# Patient Record
Sex: FEMALE | Race: WHITE | NOT HISPANIC OR LATINO | Employment: FULL TIME | URBAN - METROPOLITAN AREA
[De-identification: names, ages, dates, MRNs, and addresses within clinical notes are randomized per-mention and may not be internally consistent; named-entity substitution may affect disease eponyms.]

---

## 2017-04-13 ENCOUNTER — TELEPHONE (OUTPATIENT)
Dept: FAMILY MEDICINE | Facility: CLINIC | Age: 37
End: 2017-04-13

## 2017-04-13 DIAGNOSIS — F41.1 GAD (GENERALIZED ANXIETY DISORDER): ICD-10-CM

## 2017-04-13 DIAGNOSIS — F33.1 MAJOR DEPRESSIVE DISORDER, RECURRENT EPISODE, MODERATE (H): ICD-10-CM

## 2017-04-13 RX ORDER — BUSPIRONE HYDROCHLORIDE 5 MG/1
TABLET ORAL
Qty: 540 TABLET | Refills: 1 | Status: SHIPPED | OUTPATIENT
Start: 2017-04-13 | End: 2017-06-22

## 2017-04-13 RX ORDER — BUSPIRONE HYDROCHLORIDE 5 MG/1
TABLET ORAL
Qty: 30 TABLET | Refills: 0 | Status: SHIPPED | OUTPATIENT
Start: 2017-04-13 | End: 2018-08-31

## 2017-04-13 NOTE — TELEPHONE ENCOUNTER
Reason for Call:  Medication or medication refill:    Do you use a [facility]?  Name of the pharmacy and phone number for the current request:  [facility], pended    Name of the medication requested: busPIRone (BUSPAR) 5 MG tablet    Other request: there is another request for a refill to be sent to her mail order, but patient is currently out of this medication and is requesting that a small, 5 day supply to be sent into the [facility].  Please address ASAP.    Can we leave a detailed message on this number? YES    Phone number patient can be reached at: Cell number on file:    Telephone Information:   Mobile [phone]     Best Time: any    Call taken on 4/13/2017 at 10:29 AM by Fidelia Peters    busPIRone (BUSPAR) 5 MG tablet       Last Written Prescription Date: 9-14-16  Last Fill Quantity: 540; # refills: 1  Last Office Visit with G, P or [facility] prescribing provider:  9-2-16        Last PHQ-9 score on record=   PHQ-9 SCORE 9/2/2016   Total Score -   Total Score 3       Lab Results   Component Value Date    AST 20 08/17/2007     Lab Results   Component Value Date    ALT 22 08/17/2007

## 2017-04-13 NOTE — TELEPHONE ENCOUNTER
busPIRone (BUSPAR) 5 MG tablet    1 ordered       Summary: 15 mg (3 tabs) twice daily, Disp-540 tablet, R-1, Fax   Start: 9/14/2016  Ord/Sold: 9/14/2016 (O)  Report  [facility] - [place], [place] - [facility] [address]  Med Dose History       Patient Sig: 15 mg (3 tabs) twice daily       Ordered on: 9/14/2016       Authorized by: CHARLES ELLISON       Dispense: 540 tablet       Admin Instructions: 15 mg (3 tabs) twice daily          Last Office Visit with [facility], [facility] or [facility] prescribing provider:  9-2-2016        Last PHQ-9 score on record=   PHQ-9 SCORE 9/2/2016   Total Score -   Total Score 3       Lab Results   Component Value Date    AST 20 08/17/2007     Lab Results   Component Value Date    ALT 22 08/17/2007

## 2017-04-13 NOTE — TELEPHONE ENCOUNTER
Prescription approved per [facility] Refill Protocol.     Lina Rose RN

## 2017-04-13 NOTE — TELEPHONE ENCOUNTER
Prescription approved per [facility] Refill Protocol.     Lina Rose RN

## 2017-05-15 ENCOUNTER — TELEPHONE (OUTPATIENT)
Dept: FAMILY MEDICINE | Facility: CLINIC | Age: 37
End: 2017-05-15

## 2017-05-15 DIAGNOSIS — F33.1 MAJOR DEPRESSIVE DISORDER, RECURRENT EPISODE, MODERATE (H): ICD-10-CM

## 2017-05-15 NOTE — TELEPHONE ENCOUNTER
buPROPion (WELLBUTRIN SR) 150 MG 12 hr tablet    1 ordered  Reorder     Summary: Take 2 tablets (300mg) in the AM and one tablet (150mg) in the evening., Disp-270 tablet, R-1, E-Prescribe   Start: 8/15/2016  Ord/Sold: 8/15/2016 (O)  Report  Taking:   Long-term:   Pharmacy: [facility] - [place] [address] [address]  Med Dose History  ChangeDiscontinue       Patient Sig: Take 2 tablets (300mg) in the AM and one tablet (150mg) in the evening.       Ordered on: 8/15/2016       Authorized by: CHARLES ELLISON       Dispense: 270 tablet       Admin Instructions: Take 2 tablets (300mg) in the AM and one tablet (150mg) in the evening.          Last Office Visit with [facility], [facility] or [facility] prescribing provider:  9-2-2016        Last PHQ-9 score on record=   PHQ-9 SCORE 9/2/2016   Total Score -   Total Score 3       Lab Results   Component Value Date    AST 20 08/17/2007     Lab Results   Component Value Date    ALT 22 08/17/2007

## 2017-05-16 NOTE — TELEPHONE ENCOUNTER
Routing refill request to provider for review/approval because:  Drug interaction warning: lactation warning. Unsure if patient has a baby/is pregnant.    Jurgen Cervantes RN

## 2017-05-18 RX ORDER — BUPROPION HYDROCHLORIDE 150 MG/1
TABLET, EXTENDED RELEASE ORAL
Qty: 90 TABLET | Refills: 0 | Status: SHIPPED | OUTPATIENT
Start: 2017-05-18 | End: 2017-06-22

## 2017-05-18 NOTE — TELEPHONE ENCOUNTER
Please let her know she is due for either an ov, evisit, or phone visit.  I like to touch base every 6 months so I will refill for a month.  She was considering another pregnancy at her last appointment      Silvana Patton D.O.

## 2017-05-19 ENCOUNTER — E-VISIT (OUTPATIENT)
Dept: FAMILY MEDICINE | Facility: CLINIC | Age: 37
End: 2017-05-19
Payer: COMMERCIAL

## 2017-05-19 DIAGNOSIS — F41.1 GAD (GENERALIZED ANXIETY DISORDER): Primary | ICD-10-CM

## 2017-05-19 PROCEDURE — 99444 ZZC PHYSICIAN ONLINE EVALUATION & MANAGEMENT SERVICE: CPT | Performed by: FAMILY MEDICINE

## 2017-06-21 ENCOUNTER — E-VISIT (OUTPATIENT)
Dept: FAMILY MEDICINE | Facility: CLINIC | Age: 37
End: 2017-06-21
Payer: COMMERCIAL

## 2017-06-21 DIAGNOSIS — F41.1 GAD (GENERALIZED ANXIETY DISORDER): Primary | ICD-10-CM

## 2017-06-21 DIAGNOSIS — F33.1 MAJOR DEPRESSIVE DISORDER, RECURRENT EPISODE, MODERATE (H): ICD-10-CM

## 2017-06-21 PROCEDURE — 99444 ZZC PHYSICIAN ONLINE EVALUATION & MANAGEMENT SERVICE: CPT | Performed by: FAMILY MEDICINE

## 2017-06-22 RX ORDER — BUSPIRONE HYDROCHLORIDE 5 MG/1
TABLET ORAL
Qty: 540 TABLET | Refills: 0 | Status: SHIPPED | OUTPATIENT
Start: 2017-06-22 | End: 2017-07-25

## 2017-06-22 RX ORDER — BUPROPION HYDROCHLORIDE 150 MG/1
TABLET, EXTENDED RELEASE ORAL
Qty: 270 TABLET | Refills: 0 | Status: SHIPPED | OUTPATIENT
Start: 2017-06-22 | End: 2017-07-25

## 2017-07-25 ENCOUNTER — OFFICE VISIT (OUTPATIENT)
Dept: FAMILY MEDICINE | Facility: CLINIC | Age: 37
End: 2017-07-25
Payer: COMMERCIAL

## 2017-07-25 VITALS
SYSTOLIC BLOOD PRESSURE: 110 MMHG | HEART RATE: 74 BPM | OXYGEN SATURATION: 96 % | WEIGHT: 216.2 LBS | BODY MASS INDEX: 33.61 KG/M2 | TEMPERATURE: 98.4 F | DIASTOLIC BLOOD PRESSURE: 64 MMHG

## 2017-07-25 DIAGNOSIS — F41.1 GAD (GENERALIZED ANXIETY DISORDER): ICD-10-CM

## 2017-07-25 DIAGNOSIS — R60.0 PEDAL EDEMA: ICD-10-CM

## 2017-07-25 DIAGNOSIS — F33.1 MAJOR DEPRESSIVE DISORDER, RECURRENT EPISODE, MODERATE (H): ICD-10-CM

## 2017-07-25 DIAGNOSIS — F32.1 MODERATE MAJOR DEPRESSION (H): Primary | ICD-10-CM

## 2017-07-25 PROCEDURE — 99213 OFFICE O/P EST LOW 20 MIN: CPT | Performed by: PHYSICIAN ASSISTANT

## 2017-07-25 RX ORDER — BUPROPION HYDROCHLORIDE 150 MG/1
TABLET, EXTENDED RELEASE ORAL
Qty: 270 TABLET | Refills: 1 | Status: SHIPPED | OUTPATIENT
Start: 2017-07-25 | End: 2017-10-05

## 2017-07-25 RX ORDER — BUPROPION HYDROCHLORIDE 150 MG/1
TABLET, EXTENDED RELEASE ORAL
Qty: 270 TABLET | Refills: 0 | Status: SHIPPED | OUTPATIENT
Start: 2017-07-25 | End: 2017-07-25

## 2017-07-25 RX ORDER — BUSPIRONE HYDROCHLORIDE 5 MG/1
TABLET ORAL
Qty: 540 TABLET | Refills: 0 | Status: SHIPPED | OUTPATIENT
Start: 2017-07-25 | End: 2017-07-25

## 2017-07-25 RX ORDER — BUSPIRONE HYDROCHLORIDE 5 MG/1
TABLET ORAL
Qty: 540 TABLET | Refills: 1 | Status: SHIPPED | OUTPATIENT
Start: 2017-07-25 | End: 2017-10-05

## 2017-07-25 NOTE — PROGRESS NOTES
"  SUBJECTIVE:                                                    Keena Tobias is a 37 year old female who presents to clinic today for the following health issues:    Depression and Anxiety Follow-Up    Status since last visit: \"Doing good\"    Other associated symptoms:None    Complicating factors:     Significant life event: No     Current substance abuse: None    PHQ-9 SCORE 2015   Total Score - - -   Total Score 12 8 3     DAYANARA-7 SCORE 10/29/2012 2016   Total Score 18 -   Total Score - 11       PHQ-9  English  PHQ-9   Any Language  GAD7      Amount of exercise or physical activity: 6-7 days/week for an average of 45-60 minutes    Problems taking medications regularly: No    Medication side effects: none  Diet: meal plan through dietitian     She's been on wellbutrin and buspar for years. These medications have been life changing  She doesn't see a counselor, can't afford it  They have met their deductible for the year        Problem list and histories reviewed & adjusted, as indicated.  Additional history: as documented    ROS:  C: NEGATIVE for fever, chills, change in weight  ENDOCRINE: NEGATIVE for temperature intolerance, skin/hair changes  PSYCHIATRIC: NEGATIVE foralcohol abuse, delusions, hallucinations, drug usage, thoughts of hurting someone else and thoughts of self harm    Patient Active Problem List   Diagnosis     Varicose veins of legs     CARDIOVASCULAR SCREENING; LDL GOAL LESS THAN 160     Moderate major depression (H)     Graves disease     Compulsive overeating     Palpitations     Acne     Postoperative hypothyroidism     DAYANARA (generalized anxiety disorder)     Past Surgical History:   Procedure Laterality Date     C APPENDECTOMY       C/SECTION, LOW TRANSVERSE  11/3/10    , Low Transverse      SECTION N/A 2014    Procedure:  SECTION;  Surgeon: Rubina Birmingham MD;  Location:  [facility]       Social History   Substance Use " Topics     Smoking status: Former Smoker     Smokeless tobacco: Never Used      Comment: smoked from ages [age]     Alcohol use No     Family History   Problem Relation Age of Onset     C.A.D. Maternal Grandmother      DIABETES Maternal Grandmother      Circulatory Maternal Grandmother      Lipids Maternal Grandmother      OSTEOPOROSIS Maternal Grandmother      C.A.D. Maternal Grandfather      Alcohol/Drug Maternal Grandfather      Depression Maternal Grandfather      C.A.D. Paternal Grandmother      Obesity Paternal Grandmother      Hypertension Mother      Neurologic Disorder Mother      migranes     Lipids Mother      Prostate Cancer Paternal Grandfather      Alcohol/Drug Paternal Grandfather      Obesity Paternal Grandfather      Allergies Brother      Allergies No family hx of      self seasonal     Depression No family hx of      Eye Disorder No family hx of      self from graves disease, bulge and dryness     Neurologic Disorder No family hx of      self migranes     Obesity No family hx of      self     Thyroid Disease No family hx of      graves disease     Depression Father      Neurologic Disorder Father      MS     Musculoskeletal Disorder Father      MS     Obesity Father      Depression Sister      Depression Brother      GASTROINTESTINAL DISEASE Brother      Obesity Sister            Problem list, Medication list, Allergies, and Medical/Social/Surgical histories reviewed in [facility] and updated as appropriate.    OBJECTIVE:                                                    /64  Pulse 74  Temp 98.4  F (36.9  C) (Oral)  Wt 216 lb 3.2 oz (98.1 kg)  SpO2 96%  BMI 33.61 kg/m2 Body mass index is 33.61 kg/(m^2).   GENERAL:  Alert and oriented x 3.  WD WN  HEENT: Atramautic, PERRLA.  EOMs intact, Normal canal, TM normal, nostrils patent, oropharynx hydrated without edema erythema or exudates. Good dentition.  HEART:  Regular rhythm.  Normal rate.  No murmur, gallop, rub or  ectopy.  PMI is not  "displaced.  LUNGS:  Clear to auscultation bilaterally without rhonchi rhales or wheezes. Chest rise symmetrical and no tenderness to palpation. Good breath sounds throughout. NO deformity and no accessory muscle use  SKIN:  Warm and dry.   PSYCH: Mood: \"great\"   Affect: bright   Insight:good   Thought process: linear         ASSESSMENT/PLAN:                                                        ICD-10-CM    1. Moderate major depression (H) F32.1 MENTAL HEALTH REFERRAL   2. Major depressive disorder, recurrent episode, moderate (H) F33.1 MENTAL HEALTH REFERRAL     buPROPion (WELLBUTRIN SR) 150 MG 12 hr tablet     busPIRone (BUSPAR) 5 MG tablet     DISCONTINUED: buPROPion (WELLBUTRIN SR) 150 MG 12 hr tablet     DISCONTINUED: busPIRone (BUSPAR) 5 MG tablet   3. DAYANARA (generalized anxiety disorder) F41.1 MENTAL HEALTH REFERRAL     busPIRone (BUSPAR) 5 MG tablet     DISCONTINUED: busPIRone (BUSPAR) 5 MG tablet   4. Pedal edema R60.0 order for DME       PLAN:  1) Health habits reviewed, and patient encouraged to avoid alcohol and exercise regularly.  2) Medications and duration of treatment discussed, possible side effects reviewed, and continue medications.  3) Return appointment in 1 year.  4) Referral to therapy  Total time 15 minutes, greater than 50% counseling which is discussed in plan above and in patient instructions.   Patient Instructions   Continue medications  Follow up with therapy if insurance cost is reasonable  Use compression stockings as directed  Return to clinic for any new or worsening symptoms or go to ER Urgent care in off hours          Estimated body mass index is 33.61 kg/(m^2) as calculated from the following:    Height as of 9/2/16: 5' 7.25\" (1.708 m).    Weight as of this encounter: 216 lb 3.2 oz (98.1 kg).       Ilana Wooten [facility]  [facility]      "

## 2017-07-25 NOTE — PATIENT INSTRUCTIONS
Continue medications  Follow up with therapy if insurance cost is reasonable  Use compression stockings as directed  Return to clinic for any new or worsening symptoms or go to ER Urgent care in off hours

## 2017-07-25 NOTE — MR AVS SNAPSHOT
After Visit Summary   7/25/2017    Keena Tobias    MRN: [id number]           Patient Information     Date Of Birth          1980        Visit Information        Provider Department      7/25/2017 8:40 AM Ilana Jean Baptiste PA-C [facility]        Today's Diagnoses     Moderate major depression (H)    -  1    Major depressive disorder, recurrent episode, moderate (H)        DAYANARA (generalized anxiety disorder)        Pedal edema          Care Instructions    Continue medications  Follow up with therapy if insurance cost is reasonable  Use compression stockings as directed  Return to clinic for any new or worsening symptoms or go to ER Urgent care in off hours            Follow-ups after your visit        Additional Services     MENTAL HEALTH REFERRAL       Your provider has referred you to: [facility]: [facility] - Counseling (Individual/Couples/Family) - [facility] [phone]   [email or web address]   *Patient will be contacted by [facility] scheduling partner, [facility]), to schedule an appointment.  Patients may also call [facility] to schedule.    All scheduling is subject to the client's specific insurance plan & benefits, provider/location availability, and provider clinical specialities.  Please arrive 15 minutes early for your first appointment and bring your completed paperwork.    Please be aware that coverage of these services is subject to the terms and limitations of your health insurance plan.  Call member services at your health plan with any benefit or coverage questions.                  Who to contact     If you have questions or need follow up information about today's clinic visit or your schedule please contact [facility] directly at [phone].  Normal or non-critical lab and imaging results will be communicated to you  by N-1-1, letter or phone within 4 business days after the clinic has received the results. If you do not hear from us within 7 days, please contact the clinic through N-1-1 or phone. If you have a critical or abnormal lab result, we will notify you by phone as soon as possible.  Submit refill requests through N-1-1 or call your pharmacy and they will forward the refill request to us. Please allow 3 business days for your refill to be completed.          Additional Information About Your Visit        N-1-1 Information     N-1-1 gives you secure access to your electronic health record. If you see a primary care provider, you can also send messages to your care team and make appointments. If you have questions, please call your primary care clinic.  If you do not have a primary care provider, please call [phone] and they will assist you.        Care EveryWhere ID     This is your Care EveryWhere ID. This could be used by other organizations to access your [facility] medical records  [id number]        Your Vitals Were     Pulse Temperature Pulse Oximetry BMI (Body Mass Index)          74 98.4  F (36.9  C) (Oral) 96% 33.61 kg/m2         Blood Pressure from Last 3 Encounters:   07/25/17 110/64   09/02/16 116/72   08/02/16 116/68    Weight from Last 3 Encounters:   07/25/17 216 lb 3.2 oz (98.1 kg)   09/02/16 215 lb (97.5 kg)   08/02/16 215 lb 4 oz (97.6 kg)              We Performed the Following     DEPRESSION ACTION PLAN (DAP)     MENTAL HEALTH REFERRAL          Today's Medication Changes          These changes are accurate as of: 7/25/17  9:13 AM.  If you have any questions, ask your nurse or doctor.               These medicines have changed or have updated prescriptions.        Dose/Directions    * buPROPion 150 MG 12 hr tablet   Commonly known as:  WELLBUTRIN SR   This may have changed:  Another medication with the same name was added. Make sure you understand how and when to take each.   Used for:   Major depressive disorder, recurrent episode, moderate (H)   Changed by:  No Ref-Primary, Physician        Take 2 tablets (300mg) in the AM and one tablet (150mg) in the evening.   Quantity:  270 tablet   Refills:  1       * buPROPion 150 MG 12 hr tablet   Commonly known as:  WELLBUTRIN SR   This may have changed:  You were already taking a medication with the same name, and this prescription was added. Make sure you understand how and when to take each.   Used for:  Major depressive disorder, recurrent episode, moderate (H)   Changed by:  Ilana Jean Baptiste PA-C        TAKE TWO TABLETS BY MOUTH EVERY MORNING AND ONE TABLET EVERY EVENING   Quantity:  270 tablet   Refills:  1       * busPIRone 5 MG tablet   Commonly known as:  BUSPAR   This may have changed:  Another medication with the same name was added. Make sure you understand how and when to take each.   Used for:  Major depressive disorder, recurrent episode, moderate (H), DAYANARA (generalized anxiety disorder)   Changed by:  Silvana Patton DO        15 mg (3 tabs) twice daily   Quantity:  30 tablet   Refills:  0       * busPIRone 5 MG tablet   Commonly known as:  BUSPAR   This may have changed:  You were already taking a medication with the same name, and this prescription was added. Make sure you understand how and when to take each.   Used for:  Major depressive disorder, recurrent episode, moderate (H), DAYANARA (generalized anxiety disorder)   Changed by:  Ilana Jean Baptiste PA-C        TAKE THREE TABLETS BY MOUTH TWICE A DAY   Quantity:  540 tablet   Refills:  1       * order for DME   This may have changed:  Another medication with the same name was added. Make sure you understand how and when to take each.   Used for:  Varicose veins of legs   Changed by:  Silvana Patton DO        Equipment being ordered: compression hose   Quantity:  1 Device   Refills:  3       * order for DME   This may have changed:  You were already taking a medication  with the same name, and this prescription was added. Make sure you understand how and when to take each.   Used for:  Pedal edema   Changed by:  Ilana Jean Baptiste PA-C        Equipment being ordered: compression stockings (highest pressure) x 2   Quantity:  1 each   Refills:  0       * Notice:  This list has 6 medication(s) that are the same as other medications prescribed for you. Read the directions carefully, and ask your doctor or other care provider to review them with you.         Where to get your medicines      These medications were sent to [id number] - [place], [place] - [address] AT [facility] [address]  [address], [place] [address]     Phone:  [phone]     buPROPion 150 MG 12 hr tablet    busPIRone 5 MG tablet         Some of these will need a paper prescription and others can be bought over the counter.  Ask your nurse if you have questions.     Bring a paper prescription for each of these medications     order for DME                Primary Care Provider Office Phone # Fax #    Silvana GrossDO manju [phone]       [address] [address]        Equal Access to Services     JENN ISIDRO AH: Hadii domo dupree hadasho Soomaali, waaxda luqadaha, qaybta kaalmada adeegyada, monico harry. So [facility] [phone].    ATENCIÓN: Si habla español, tiene a russell disposición servicios gratuitos de asistencia lingüística. Llame al [phone].    We comply with applicable federal civil rights laws and [place] laws. We do not discriminate on the basis of race, color, national origin, age, disability sex, sexual orientation or gender identity.            Thank you!     Thank you for choosing [facility]  [place] your [place]. Our goal is always to provide you with excellent care. Hearing back from our patients is one way we can continue to improve our services.  Please take a few minutes to complete the written survey that you may receive in the mail after your visit with us. Thank you!             Your Updated Medication List - Protect others around you: Learn how to safely use, store and throw away your medicines at www.disposemymeds.org.          This list is accurate as of: 7/25/17  9:13 AM.  Always use your most recent med list.                   Brand Name Dispense Instructions for use Diagnosis    * buPROPion 150 MG 12 hr tablet    WELLBUTRIN SR    270 tablet    Take 2 tablets (300mg) in the AM and one tablet (150mg) in the evening.    Major depressive disorder, recurrent episode, moderate (H)       * buPROPion 150 MG 12 hr tablet    WELLBUTRIN SR    270 tablet    TAKE TWO TABLETS BY MOUTH EVERY MORNING AND ONE TABLET EVERY EVENING    Major depressive disorder, recurrent episode, moderate (H)       * busPIRone 5 MG tablet    BUSPAR    30 tablet    15 mg (3 tabs) twice daily    Major depressive disorder, recurrent episode, moderate (H), DAYANARA (generalized anxiety disorder)       * busPIRone 5 MG tablet    BUSPAR    540 tablet    TAKE THREE TABLETS BY MOUTH TWICE A DAY    Major depressive disorder, recurrent episode, moderate (H), DAYANARA (generalized anxiety disorder)       levothyroxine 200 MCG tablet    SYNTHROID/LEVOTHROID    60 tablet    Take 1 tablet (200 mcg) by mouth daily Ask provider if she wants to see you or if you should see endocrine.    Acquired hypothyroidism       * order for DME     1 Device    Equipment being ordered: compression hose    Varicose veins of legs       * order for DME     1 each    Equipment being ordered: compression stockings (highest pressure) x 2    Pedal edema       VITAMIN D3 PO      Take 2,000 Units by mouth daily        * Notice:  This list has 6 medication(s) that are the same as other medications prescribed for you. Read the directions carefully, and ask your doctor or other care provider to review them with you.

## 2017-07-25 NOTE — LETTER
My Depression Action Plan  Name: Keena Tobias   Date of Birth 1980  Date: 7/25/2017    My doctor: Silvana Patton   My clinic: [address] [address] [phone]          GREEN    ZONE   Good Control    What it looks like:     Things are going generally well. You have normal up s and down s. You may even feel depressed from time to time, but bad moods usually last less than a day.   What you need to do:  1. Continue to care for yourself (see self care plan)  2. Check your depression survival kit and update it as needed  3. Follow your physician s recommendations including any medication.  4. Do not stop taking medication unless you consult with your physician first.           YELLOW         ZONE Getting Worse    What it looks like:     Depression is starting to interfere with your life.     It may be hard to get out of bed; you may be starting to isolate yourself from others.    Symptoms of depression are starting to last most all day and this has happened for several days.     You may have suicidal thoughts but they are not constant.   What you need to do:     1. Call your care team, your response to treatment will improve if you keep your care team informed of your progress. Yellow periods are signs an adjustment may need to be made.     2. Continue your self-care, even if you have to fake it!    3. Talk to someone in your support network    4. Open up your depression survival kit           RED    ZONE Medical Alert - Get Help    What it looks like:     Depression is seriously interfering with your life.     You may experience these or other symptoms: You can t get out of bed most days, can t work or engage in other necessary activities, you have trouble taking care of basic hygiene, or basic responsibilities, thoughts of suicide or death that will not go away, self-injurious behavior.     What you need to do:  1. Call  your care team and request a same-day appointment. If they are not available (weekends or after hours) call your local crisis line, emergency room or 911.      Electronically signed by: Anna Villalba, July 25, 2017    Depression Self Care Plan / Survival Kit    Self-Care for Depression  Here s the deal. Your body and mind are really not as separate as most people think.  What you do and think affects how you feel and how you feel influences what you do and think. This means if you do things that people who feel good do, it will help you feel better.  Sometimes this is all it takes.  There is also a place for medication and therapy depending on how severe your depression is, so be sure to consult with your medical provider and/ or Behavioral Health Consultant if your symptoms are worsening or not improving.     In order to better manage my stress, I will:    Exercise  Get some form of exercise, every day. This will help reduce pain and release endorphins, the  feel good  chemicals in your brain. This is almost as good as taking antidepressants!  This is not the same as joining a gym and then never going! (they count on that by the way ) It can be as simple as just going for a walk or doing some gardening, anything that will get you moving.      Hygiene   Maintain good hygiene (Get out of bed in the morning, Make your bed, Brush your teeth, Take a shower, and Get dressed like you were going to work, even if you are unemployed).  If your clothes don't fit try to get ones that do.    Diet  I will strive to eat foods that are good for me, drink plenty of water, and avoid excessive sugar, caffeine, alcohol, and other mood-altering substances.  Some foods that are helpful in depression are: complex carbohydrates, B vitamins, flaxseed, fish or fish oil, fresh fruits and vegetables.    Psychotherapy  I agree to participate in Individual Therapy (if recommended).    Medication  If prescribed medications, I agree to take  them.  Missing doses can result in serious side effects.  I understand that drinking alcohol, or other illicit drug use, may cause potential side effects.  I will not stop my medication abruptly without first discussing it with my provider.    Staying Connected With Others  I will stay in touch with my friends, family members, and my primary care provider/team.    Use your imagination  Be creative.  We all have a creative side; it doesn t matter if it s oil painting, sand castles, or mud pies! This will also kick up the endorphins.    Witness Beauty  (AKA stop and smell the roses) Take a look outside, even in mid-winter. Notice colors, textures. Watch the squirrels and birds.     Service to others  Be of service to others.  There is always someone else in need.  By helping others we can  get out of ourselves  and remember the really important things.  This also provides opportunities for practicing all the other parts of the program.    Humor  Laugh and be silly!  Adjust your TV habits for less news and crime-drama and more comedy.    Control your stress  Try breathing deep, massage therapy, biofeedback, and meditation. Find time to relax each day.     My support system    Clinic Contact:  Phone number:    Contact 1:  Phone number:    Contact 2:  Phone number:    Uatsdin/:  Phone number:    Therapist:  Phone number:    Local crisis center:    Phone number:    Other community support:  Phone number:

## 2017-07-25 NOTE — NURSING NOTE
"No chief complaint on file.      Initial /64  Pulse 74  Temp 98.4  F (36.9  C) (Oral)  Wt 216 lb 3.2 oz (98.1 kg)  SpO2 96%  BMI 33.61 kg/m2 Estimated body mass index is 33.61 kg/(m^2) as calculated from the following:    Height as of 9/2/16: 5' 7.25\" (1.708 m).    Weight as of this encounter: 216 lb 3.2 oz (98.1 kg).  Medication Reconciliation: complete     Anna Villalba MA      "

## 2017-07-26 ASSESSMENT — PATIENT HEALTH QUESTIONNAIRE - PHQ9: SUM OF ALL RESPONSES TO PHQ QUESTIONS 1-9: 2

## 2017-09-26 DIAGNOSIS — F33.1 MAJOR DEPRESSIVE DISORDER, RECURRENT EPISODE, MODERATE (H): ICD-10-CM

## 2017-09-26 NOTE — TELEPHONE ENCOUNTER
Medication Detail      Disp Refills Start End [facility]   buPROPion (WELLBUTRIN SR) 150 MG 12 hr tablet 270 tablet 1 7/25/2017  No   Sig: TAKE TWO TABLETS BY MOUTH EVERY MORNING AND ONE TABLET EVERY EVENING   Class: E-Prescribe   Order: [id number]       Last Office Visit with [facility], [facility] or [facility] prescribing provider:  7/25/2017        Last PHQ-9 score on record=   PHQ-9 SCORE 7/25/2017   Total Score -   Total Score 2       Lab Results   Component Value Date    AST 20 08/17/2007     Lab Results   Component Value Date    ALT 22 08/17/2007     \

## 2017-09-28 RX ORDER — BUPROPION HYDROCHLORIDE 150 MG/1
TABLET, EXTENDED RELEASE ORAL
Qty: 270 TABLET | Refills: 0 | OUTPATIENT
Start: 2017-09-28

## 2017-09-28 NOTE — TELEPHONE ENCOUNTER
There should be one refill remaining- it is requested by the same pharmacy- denied.   Glory Stephenson RN

## 2017-09-29 ENCOUNTER — TELEPHONE (OUTPATIENT)
Dept: FAMILY MEDICINE | Facility: CLINIC | Age: 37
End: 2017-09-29

## 2017-10-05 ENCOUNTER — OFFICE VISIT (OUTPATIENT)
Dept: FAMILY MEDICINE | Facility: CLINIC | Age: 37
End: 2017-10-05
Payer: COMMERCIAL

## 2017-10-05 VITALS
WEIGHT: 207 LBS | SYSTOLIC BLOOD PRESSURE: 112 MMHG | DIASTOLIC BLOOD PRESSURE: 70 MMHG | TEMPERATURE: 98.6 F | HEART RATE: 80 BPM | BODY MASS INDEX: 32.49 KG/M2 | HEIGHT: 67 IN

## 2017-10-05 DIAGNOSIS — F33.1 MAJOR DEPRESSIVE DISORDER, RECURRENT EPISODE, MODERATE (H): ICD-10-CM

## 2017-10-05 DIAGNOSIS — F41.1 GAD (GENERALIZED ANXIETY DISORDER): Primary | ICD-10-CM

## 2017-10-05 DIAGNOSIS — Z23 NEED FOR PROPHYLACTIC VACCINATION AND INOCULATION AGAINST INFLUENZA: ICD-10-CM

## 2017-10-05 PROCEDURE — 90471 IMMUNIZATION ADMIN: CPT | Performed by: FAMILY MEDICINE

## 2017-10-05 PROCEDURE — 99214 OFFICE O/P EST MOD 30 MIN: CPT | Mod: 25 | Performed by: FAMILY MEDICINE

## 2017-10-05 PROCEDURE — 90686 IIV4 VACC NO PRSV 0.5 ML IM: CPT | Performed by: FAMILY MEDICINE

## 2017-10-05 RX ORDER — BUPROPION HYDROCHLORIDE 150 MG/1
TABLET, EXTENDED RELEASE ORAL
Qty: 270 TABLET | Refills: 1 | Status: SHIPPED | OUTPATIENT
Start: 2017-10-05 | End: 2018-08-31

## 2017-10-05 RX ORDER — BUSPIRONE HYDROCHLORIDE 5 MG/1
TABLET ORAL
Qty: 540 TABLET | Refills: 1 | Status: SHIPPED | OUTPATIENT
Start: 2017-10-05 | End: 2018-07-05

## 2017-10-05 NOTE — TELEPHONE ENCOUNTER
I resent this medication can we look into the PA again.  The last provider that refilled it did the LEISA Patton D.O.

## 2017-10-05 NOTE — PROGRESS NOTES
SUBJECTIVE:   Keena Tobias is a 37 year old female who presents to clinic today for the following health issues:      Depression and Anxiety Follow-Up    Status since last visit: Improved     Other associated symptoms:None    Complicating factors:     Significant life event: No     Current substance abuse: None    Wellbutrin and Buspar     Patient states that patient her medications are working really well. She has been on them together for about 6 years. She says that her insurance needs a prior authorization for her Wellbutrin because they stopped covering her dose of Wellbutrin. She has been out of Wellbutrin for a couple weeks.     PHQ-9 Score and MyChart F/U Questions 8/2/2016 9/2/2016 7/25/2017   Total Score 8 3 2   Q9: Suicide Ideation Not at all Not at all Not at all     DAYANARA-7 SCORE 10/29/2012 8/2/2016   Total Score 18 -   Total Score - 11     PHQ-9  English  PHQ-9   Any Language  GAD7  Suicide Assessment Five-step Evaluation and Treatment (SAFE-T)    Hair Loss:  Patient states that her thyroid has been good. She sees an endocrinologist. In 10/16 she had a hyperthyroid. She states she has seen the specialist since then and it was good.     Of Note:  Her mother's generation has no breast cancer, but her paternal grandmother and all her female siblings had breast cancer.         Amount of exercise or physical activity: 6-7 days/week for an average of greater than 60 minutes    Problems taking medications regularly: No    Medication side effects: none  Diet: regular (no restrictions)      Problem list and histories reviewed & adjusted, as indicated.  Additional history: as documented    BP Readings from Last 3 Encounters:   10/05/17 112/70   07/25/17 110/64   09/02/16 116/72    Wt Readings from Last 3 Encounters:   10/05/17 207 lb (93.9 kg)   07/25/17 216 lb 3.2 oz (98.1 kg)   09/02/16 215 lb (97.5 kg)                      Reviewed and updated as needed this visit by clinical staff  Tobacco   "Allergies  Meds       Reviewed and updated as needed this visit by Provider  Meds         ROS:  Constitutional, HEENT, cardiovascular, pulmonary, gi and gu systems are negative, except as otherwise noted.    This document serves as a record of the services and decisions personally performed by CHARLES ELLISON. It was created on his/her behalf by Ethel Powell, a trained medical scribe. The creation of this document is based on the provider's statements to the medical scribe. Ethel Powell, October 5, 2017 2:43 PM    OBJECTIVE:   /70 (BP Location: Right arm, Cuff Size: Adult Large)  Pulse 80  Temp 98.6  F (37  C) (Oral)  Ht 5' 7.25\" (1.708 m)  Wt 207 lb (93.9 kg)  BMI 32.18 kg/m2  Body mass index is 32.18 kg/(m^2).  GENERAL: healthy, alert and no distress  EYES: Eyes grossly normal to inspection, PERRL and conjunctivae and sclerae normal  HENT: ear canals and TM's normal, nose and mouth without ulcers or lesions  RESP: lungs clear to auscultation - no rales, rhonchi or wheezes  CV: regular rate and rhythm, normal S1 S2, no S3 or S4, no murmur, click or rub, no peripheral edema and peripheral pulses strong  PSYCH: mentation appears normal, affect normal/bright    Diagnostic Test Results:  No results found for this or any previous visit (from the past 24 hour(s)).    ASSESSMENT/PLAN:     Depression; recurrent episode-- Mild   Associated with the following complications:    None   Plan:  No changes in the patient's current treatment plan          ICD-10-CM    1. DAYANARA (generalized anxiety disorder) F41.1 busPIRone (BUSPAR) 5 MG tablet   2. Major depressive disorder, recurrent episode, moderate (H) F33.1 buPROPion (WELLBUTRIN SR) 150 MG 12 hr tablet     busPIRone (BUSPAR) 5 MG tablet   3. Need for prophylactic vaccination and inoculation against influenza Z23 Vaccine Administration, Initial [49950]     FLU VAC, SPLIT VIRUS IM > 3 YO (QUADRIVALENT) [33110]       I refilled the patient's Wellbutrin and Buspar. She will " touch base with me via [employer] in 6 months. I am comfortable seeing her in person every year, as long as she messages me for refills every 6 months, as her medications have been stable for about 6 years.     Patient Instructions   Touch base in 6 months.      Silvana Patton,   [facility]    The information in this document, created by the medical scribe Ethel Powell for me, accurately reflects the services I personally performed and the decisions made by me. I have reviewed and approved this document for accuracy prior to leaving the patient care area.    Injectable Influenza Immunization Documentation    1.  Is the person to be vaccinated sick today?   No    2. Does the person to be vaccinated have an allergy to a component   of the vaccine?   No    3. Has the person to be vaccinated ever had a serious reaction   to influenza vaccine in the past?   No    4. Has the person to be vaccinated ever had Guillain-Barré syndrome?   No    Form completed by Ros Law CMA ([facility])

## 2017-10-05 NOTE — MR AVS SNAPSHOT
After Visit Summary   10/5/2017    Keena Tobias    MRN: [id number]           Patient Information     Date Of Birth          1980        Visit Information        Provider Department      10/5/2017 2:00 PM Silvana Patton DO [facility]        Today's Diagnoses     Moderate major depression (H)    -  1    DAYANARA (generalized anxiety disorder)        Need for prophylactic vaccination and inoculation against influenza        Major depressive disorder, recurrent episode, moderate (H)          Care Instructions    Touch base in 6 months.          Follow-ups after your visit        Who to contact     If you have questions or need follow up information about today's clinic visit or your schedule please contact [facility] directly at [phone].  Normal or non-critical lab and imaging results will be communicated to you by [employer]hart, letter or phone within 4 business days after the clinic has received the results. If you do not hear from us within 7 days, please contact the clinic through [employer]hart or phone. If you have a critical or abnormal lab result, we will notify you by phone as soon as possible.  Submit refill requests through [facility] or call your pharmacy and they will forward the refill request to us. Please allow 3 business days for your refill to be completed.          Additional Information About Your Visit        MyChart Information     [facility] gives you secure access to your electronic health record. If you see a primary care provider, you can also send messages to your care team and make appointments. If you have questions, please call your primary care clinic.  If you do not have a primary care provider, please call [phone] and they will assist you.        Care EveryWhere ID     This is your Care EveryWhere ID. This could be used by other organizations to access your [place] medical records  [id number]        Your Vitals Were     Pulse  "Temperature Height BMI (Body Mass Index)          80 98.6  F (37  C) (Oral) 5' 7.25\" (1.708 m) 32.18 kg/m2         Blood Pressure from Last 3 Encounters:   10/05/17 112/70   07/25/17 110/64   09/02/16 116/72    Weight from Last 3 Encounters:   10/05/17 207 lb (93.9 kg)   07/25/17 216 lb 3.2 oz (98.1 kg)   09/02/16 215 lb (97.5 kg)              We Performed the Following     FLU VAC, SPLIT VIRUS IM > 3 YO (QUADRIVALENT) [45875]     Vaccine Administration, Initial [89459]          Where to get your medicines      These medications were sent to [id number] - [place], [place] - [address] AT [facility] [address]  [address], [place] [address]     Phone:  [phone]     buPROPion 150 MG 12 hr tablet    busPIRone 5 MG tablet          Primary Care Provider Office Phone # Fax #    Silvana PattonDO [phone]       [address] [facility] [address]        Equal Access to Services     JENN ISIDRO AH: Hadii aad ku hadasho Soomaali, waaxda luqadaha, qaybta kaalmada adeegyada, monico harry. So [facility] [phone].    ATENCIÓN: Si habla español, tiene a russell disposición servicios gratuitos de asistencia lingüística. Bhaskar al [phone].    We comply with applicable federal civil rights laws and [place] laws. We do not discriminate on the basis of race, color, national origin, age, disability, sex, sexual orientation, or gender identity.            Thank you!     Thank you for choosing [facility]  for your care. Our goal is always to provide you with excellent care. Hearing back from our patients is one way we can continue to improve our services. Please take a few minutes to complete the written survey that you may receive in the mail after your visit with us. Thank you!             Your Updated Medication List - Protect others around you: Learn how to safely use, store and throw away your medicines at " www.disposemymeds.org.          This list is accurate as of: 10/5/17  2:43 PM.  Always use your most recent med list.                   Brand Name Dispense Instructions for use Diagnosis    * buPROPion 150 MG 12 hr tablet    WELLBUTRIN SR    270 tablet    Take 2 tablets (300mg) in the AM and one tablet (150mg) in the evening.    Major depressive disorder, recurrent episode, moderate (H)       * buPROPion 150 MG 12 hr tablet    WELLBUTRIN SR    270 tablet    TAKE TWO TABLETS BY MOUTH EVERY MORNING AND ONE TABLET EVERY EVENING    Major depressive disorder, recurrent episode, moderate (H)       * busPIRone 5 MG tablet    BUSPAR    30 tablet    15 mg (3 tabs) twice daily    Major depressive disorder, recurrent episode, moderate (H), DAYANARA (generalized anxiety disorder)       * busPIRone 5 MG tablet    BUSPAR    540 tablet    TAKE THREE TABLETS BY MOUTH TWICE A DAY    Major depressive disorder, recurrent episode, moderate (H), DAYANARA (generalized anxiety disorder)       levothyroxine 200 MCG tablet    SYNTHROID/LEVOTHROID    60 tablet    Take 1 tablet (200 mcg) by mouth daily Ask provider if she wants to see you or if you should see endocrine.    Acquired hypothyroidism       * order for DME     1 Device    Equipment being ordered: compression hose    Varicose veins of legs       * order for DME     1 each    Equipment being ordered: compression stockings (highest pressure) x 2    Pedal edema       VITAMIN D3 PO      Take 2,000 Units by mouth daily        * Notice:  This list has 6 medication(s) that are the same as other medications prescribed for you. Read the directions carefully, and ask your doctor or other care provider to review them with you.

## 2017-10-05 NOTE — NURSING NOTE
"Prior to injection verified patient identity using patient's name and date of birth.    Chief Complaint   Patient presents with     Flu Shot     DONE     Anxiety     Depression       Initial /70 (BP Location: Right arm, Cuff Size: Adult Large)  Pulse 80  Temp 98.6  F (37  C) (Oral)  Ht 5' 7.25\" (1.708 m)  Wt 207 lb (93.9 kg)  BMI 32.18 kg/m2 Estimated body mass index is 32.18 kg/(m^2) as calculated from the following:    Height as of this encounter: 5' 7.25\" (1.708 m).    Weight as of this encounter: 207 lb (93.9 kg).  Medication Reconciliation: complete     Ros Law CMA (AAMA)      "

## 2017-10-06 NOTE — TELEPHONE ENCOUNTER
Called [facility] to check on the status of the bupropion.  It does not require a PA, but she can only  one month at a time.  Pharmacy said they spoke to patient about this this morning.    Nothing more is required of us.    Janneth Salomon, Certified Medical Assistant (AAMA)

## 2018-03-15 ENCOUNTER — TELEPHONE (OUTPATIENT)
Dept: FAMILY MEDICINE | Facility: CLINIC | Age: 38
End: 2018-03-15

## 2018-03-15 NOTE — TELEPHONE ENCOUNTER
Prior Authorization Retail Medication Request    Medication/Dose: buPROPion (WELLBUTRIN SR) 150 MG 12 hr tablet  ICD code (if different than what is on RX):  Previously Tried and Failed:  Rationale:    Insurance Name: unk?  Insurance ID: [id number]      Pharmacy Information (if different than what is on RX)  Name:na  Phone:na

## 2018-03-20 NOTE — TELEPHONE ENCOUNTER
Central Prior Authorization Team   Phone: [phone]      PA Initiation    Medication: buPROPion (WELLBUTRIN SR) 150 MG 12 hr tablet-Initiated  Insurance Company: [employer] - Phone [phone] Fax [phone]  Pharmacy Filling the Rx: [facility] [id number] - [place], [place] - [address] AT [facility] [address]  Filling Pharmacy Phone: [phone]  Filling Pharmacy Fax:    Start Date: 3/20/2018

## 2018-03-22 NOTE — TELEPHONE ENCOUNTER
Prior Authorization Approval    Authorization Effective Date: 3/20/2018  Authorization Expiration Date: 3/20/2019  Medication: buPROPion (WELLBUTRIN SR) 150 MG 12 hr tablet-APPROVED  Approved Dose/Quantity:   Reference #:     Insurance Company: [employer] - Phone [phone] Fax [phone]  Expected CoPay: $ Refill too soon     CoPay Card Available:      Foundation Assistance Needed:    Which Pharmacy is filling the prescription (Not needed for infusion/clinic administered): [id number] - [place], [place] - [address] AT [facility] & [facility]  Pharmacy Notified: Yes  Patient Notified: Yes

## 2018-03-22 NOTE — TELEPHONE ENCOUNTER
Called and spoke LEISA Nice rep, at [facility].  She shows the PA was approved.  Asked to have another approval fax sent over.

## 2018-07-05 ENCOUNTER — TELEPHONE (OUTPATIENT)
Dept: FAMILY MEDICINE | Facility: CLINIC | Age: 38
End: 2018-07-05

## 2018-07-05 DIAGNOSIS — F41.1 GAD (GENERALIZED ANXIETY DISORDER): ICD-10-CM

## 2018-07-05 DIAGNOSIS — F33.1 MAJOR DEPRESSIVE DISORDER, RECURRENT EPISODE, MODERATE (H): ICD-10-CM

## 2018-07-05 NOTE — LETTER
[address] [address] [phone]          July 12, 2018    Keena Vincent Tobias                                                                                                                     [address] [facility] [address]            Dear Keena,    We have tried to contact you, but have not been able to connect with you.    We were calling to let you know that we received a refill request for a medication.    We were able to send in a supply to your pharmacy, but the provider noted that you will need to be seen for a follow up in order to continue the medication.    Please call us at [phone] if you have any questions or to schedule an appointment.    Thank you    Sincerely,     Silvana Patton DO

## 2018-07-06 RX ORDER — BUSPIRONE HYDROCHLORIDE 5 MG/1
TABLET ORAL
Qty: 180 TABLET | Refills: 0 | Status: SHIPPED | OUTPATIENT
Start: 2018-07-06 | End: 2018-08-23

## 2018-07-06 NOTE — TELEPHONE ENCOUNTER
Message left on home number for patient to call back Clinic to schedule an appointment.  Needs to be seen for Depression and anxiety

## 2018-07-06 NOTE — TELEPHONE ENCOUNTER
"Due for a visit, jasen x1 with note.  LOV- 10/5/17. Depression and anxiety.     Glory Stephenson, JOSE   Requested Prescriptions   Pending Prescriptions Disp Refills     busPIRone (BUSPAR) 5 MG tablet 540 tablet 1     Sig: TAKE THREE TABLETS BY MOUTH TWICE A DAY    Atypical Antidepressants Protocol Failed    7/5/2018  9:15 AM       Failed - Patient has PHQ-9 score less than 5 in past 6 months.    Please review last PHQ-9 score.          Failed - Recent (6 mo) or future (30 days) visit within the authorizing provider's specialty    Patient had office visit in the last 6 months or has a visit in the next 30 days with authorizing provider or within the authorizing provider's specialty.  See \"Patient Info\" tab in inbasket, or \"Choose Columns\" in Meds & Orders section of the refill encounter.           Passed - Patient is age 18 or older       Passed - No active pregnancy on record       Passed - No positive pregnancy test in past 12 mos          "

## 2018-08-23 ENCOUNTER — MYC MEDICAL ADVICE (OUTPATIENT)
Dept: FAMILY MEDICINE | Facility: CLINIC | Age: 38
End: 2018-08-23

## 2018-08-23 DIAGNOSIS — F33.1 MAJOR DEPRESSIVE DISORDER, RECURRENT EPISODE, MODERATE (H): ICD-10-CM

## 2018-08-23 DIAGNOSIS — F41.1 GAD (GENERALIZED ANXIETY DISORDER): ICD-10-CM

## 2018-08-23 RX ORDER — BUPROPION HYDROCHLORIDE 150 MG/1
TABLET, EXTENDED RELEASE ORAL
Qty: 270 TABLET | Refills: 0 | Status: CANCELLED | OUTPATIENT
Start: 2018-08-23

## 2018-08-23 RX ORDER — BUSPIRONE HYDROCHLORIDE 5 MG/1
TABLET ORAL
Qty: 540 TABLET | Refills: 0 | Status: SHIPPED | OUTPATIENT
Start: 2018-08-23 | End: 2019-06-14

## 2018-08-23 NOTE — TELEPHONE ENCOUNTER
Pended buspar, routed since patient is asking for a 3 month supply (I am only able to give 30 days) and patient is scheduled 8/31. Seen last 10/5.  Glory Stephenson RN

## 2018-08-31 ENCOUNTER — OFFICE VISIT (OUTPATIENT)
Dept: FAMILY MEDICINE | Facility: CLINIC | Age: 38
End: 2018-08-31
Payer: COMMERCIAL

## 2018-08-31 VITALS
HEIGHT: 67 IN | DIASTOLIC BLOOD PRESSURE: 74 MMHG | SYSTOLIC BLOOD PRESSURE: 118 MMHG | BODY MASS INDEX: 36.63 KG/M2 | TEMPERATURE: 98.4 F | WEIGHT: 233.38 LBS | HEART RATE: 96 BPM

## 2018-08-31 DIAGNOSIS — E89.0 POSTOPERATIVE HYPOTHYROIDISM: ICD-10-CM

## 2018-08-31 DIAGNOSIS — F33.1 MAJOR DEPRESSIVE DISORDER, RECURRENT EPISODE, MODERATE (H): Primary | ICD-10-CM

## 2018-08-31 DIAGNOSIS — F41.1 GAD (GENERALIZED ANXIETY DISORDER): ICD-10-CM

## 2018-08-31 DIAGNOSIS — E05.00 GRAVES DISEASE: ICD-10-CM

## 2018-08-31 PROCEDURE — 84439 ASSAY OF FREE THYROXINE: CPT | Performed by: FAMILY MEDICINE

## 2018-08-31 PROCEDURE — 36415 COLL VENOUS BLD VENIPUNCTURE: CPT | Performed by: FAMILY MEDICINE

## 2018-08-31 PROCEDURE — 99214 OFFICE O/P EST MOD 30 MIN: CPT | Performed by: FAMILY MEDICINE

## 2018-08-31 PROCEDURE — 80048 BASIC METABOLIC PNL TOTAL CA: CPT | Performed by: FAMILY MEDICINE

## 2018-08-31 PROCEDURE — 84443 ASSAY THYROID STIM HORMONE: CPT | Performed by: FAMILY MEDICINE

## 2018-08-31 RX ORDER — BUSPIRONE HYDROCHLORIDE 15 MG/1
15 TABLET ORAL 2 TIMES DAILY
Qty: 180 TABLET | Refills: 1 | Status: SHIPPED | OUTPATIENT
Start: 2018-08-31 | End: 2019-06-14

## 2018-08-31 RX ORDER — BUPROPION HYDROCHLORIDE 150 MG/1
TABLET, EXTENDED RELEASE ORAL
Qty: 270 TABLET | Refills: 1 | Status: SHIPPED | OUTPATIENT
Start: 2018-08-31 | End: 2019-02-11

## 2018-08-31 RX ORDER — LEVOTHYROXINE SODIUM 25 UG/1
25 TABLET ORAL DAILY
COMMUNITY
End: 2018-09-04

## 2018-08-31 RX ORDER — BUSPIRONE HYDROCHLORIDE 5 MG/1
TABLET ORAL
Qty: 540 TABLET | Refills: 0 | Status: CANCELLED | OUTPATIENT
Start: 2018-08-31

## 2018-08-31 ASSESSMENT — ANXIETY QUESTIONNAIRES
7. FEELING AFRAID AS IF SOMETHING AWFUL MIGHT HAPPEN: NOT AT ALL
2. NOT BEING ABLE TO STOP OR CONTROL WORRYING: SEVERAL DAYS
IF YOU CHECKED OFF ANY PROBLEMS ON THIS QUESTIONNAIRE, HOW DIFFICULT HAVE THESE PROBLEMS MADE IT FOR YOU TO DO YOUR WORK, TAKE CARE OF THINGS AT HOME, OR GET ALONG WITH OTHER PEOPLE: SOMEWHAT DIFFICULT
3. WORRYING TOO MUCH ABOUT DIFFERENT THINGS: SEVERAL DAYS
6. BECOMING EASILY ANNOYED OR IRRITABLE: MORE THAN HALF THE DAYS
GAD7 TOTAL SCORE: 8
1. FEELING NERVOUS, ANXIOUS, OR ON EDGE: SEVERAL DAYS
5. BEING SO RESTLESS THAT IT IS HARD TO SIT STILL: MORE THAN HALF THE DAYS

## 2018-08-31 ASSESSMENT — PATIENT HEALTH QUESTIONNAIRE - PHQ9: 5. POOR APPETITE OR OVEREATING: SEVERAL DAYS

## 2018-08-31 NOTE — PROGRESS NOTES
"  SUBJECTIVE:   Keena Tobias is a 38 year old female who presents to clinic today for the following health issues:      Depression and Anxiety Follow-Up    Status since last visit: \"the same\"    Other associated symptoms:None    Complicating factors:     Significant life event: No     Current substance abuse: None    Patient reports that she has more anxiety than she does depression. She notes Buspar has \"Changed my life\". Wellbutrin is also working well for her.     PHQ-9 8/2/2016 9/2/2016 7/25/2017   Total Score 8 3 2   Q9: Suicide Ideation Not at all Not at all Not at all     DAYANARA-7 SCORE 10/29/2012 8/2/2016   Total Score 18 -   Total Score - 11     PHQ-9  English  PHQ-9   Any Language  DAYANARA-7    Suicide Assessment Five-step Evaluation and Treatment (SAFE-T)      Hypothyroidism Follow-up      Since last visit, patient describes the following symptoms: Weight stable, no hair loss, no skin changes, no constipation, no loose stools  Patient reports that she sees an endocrinologist regularly, but they do not have EMR.           Amount of exercise or physical activity: 4 days per week with having kids and keeping busy    Problems taking medications regularly: No    Medication side effects: none    Diet: regular (no restrictions)        Problem list and histories reviewed & adjusted, as indicated.  Additional history: as documented    BP Readings from Last 3 Encounters:   08/31/18 118/74   10/05/17 112/70   07/25/17 110/64    Wt Readings from Last 3 Encounters:   08/31/18 105.9 kg (233 lb 6 oz)   10/05/17 93.9 kg (207 lb)   07/25/17 98.1 kg (216 lb 3.2 oz)                    Reviewed and updated as needed this visit by clinical staff  Tobacco  Meds  Med Hx  Surg Hx  Fam Hx  Soc Hx      Reviewed and updated as needed this visit by Provider         ROS:  Constitutional, HEENT, cardiovascular, pulmonary, gi and gu systems are negative, except as otherwise noted.    This document serves as a record of the " "services and decisions personally performed by CHARLES ELLISON. It was created on his/her behalf by Ethel Powell, a trained medical scribe. The creation of this document is based on the provider's statements to the medical scribe. Ethel Powell, August 31, 2018 9:40 AM    OBJECTIVE:     /74 (BP Location: Right arm, Cuff Size: Adult Large)  Pulse 96  Temp 98.4  F (36.9  C) (Oral)  Ht 1.708 m (5' 7.25\")  Wt 105.9 kg (233 lb 6 oz)  BMI 36.28 kg/m2  Body mass index is 36.28 kg/(m^2).  GENERAL: healthy, alert and no distress  HENT: ear canals and TM's normal, nose and mouth without ulcers or lesions  NECK: no adenopathy, no asymmetry, masses, or scars and thyroid normal to palpation  RESP: lungs clear to auscultation - no rales, rhonchi or wheezes  CV: regular rate and rhythm, normal S1 S2, no S3 or S4, no murmur, click or rub, no peripheral edema and peripheral pulses strong  PSYCH: mentation appears normal, affect normal/bright    Diagnostic Test Results:  No results found for this or any previous visit (from the past 24 hour(s)).    ASSESSMENT/PLAN:     Depression; recurrent episode-- Mild   Associated with the following complications:    None   Plan:  No changes in the patient's current treatment plan        ICD-10-CM    1. Major depressive disorder, recurrent episode, moderate (H) F33.1 buPROPion (WELLBUTRIN SR) 150 MG 12 hr tablet   2. DAYANARA (generalized anxiety disorder) F41.1 busPIRone (BUSPAR) 15 MG tablet   3. Graves disease E05.00 TSH WITH FREE T4 REFLEX   4. Postoperative hypothyroidism E89.0 TSH WITH FREE T4 REFLEX   5. BMI 36.0-36.9,adult Z68.36 Basic metabolic panel  (Ca, Cl, CO2, Creat, Gluc, K, Na, BUN)     Patient's depression and anxiety are well controlled on buspar and wellbutrin. She will continue to see endocrinology for Graves disease and postoperative hypothyroidism. Patient will follow up with me in 6 months.       Patient Instructions   Follow up with me in 6 months.     If you can, it is " helpful if you fill out a survey about your clinic visit if it is mailed to your house in a few weeks          Silvana Patton, DO  [facility]    The information in this document, created by the medical scribe Ethel Powell for me, accurately reflects the services I personally performed and the decisions made by me. I have reviewed and approved this document for accuracy prior to leaving the patient care area.

## 2018-08-31 NOTE — MR AVS SNAPSHOT
After Visit Summary   8/31/2018    Keena Tobias    MRN: [id number]           Patient Information     Date Of Birth          1980        Visit Information        Provider Department      8/31/2018 9:20 AM Silvana Patton DO [facility]        Today's Diagnoses     Major depressive disorder, recurrent episode, moderate (H)    -  1    DAYANARA (generalized anxiety disorder)        Graves disease        Postoperative hypothyroidism        BMI 36.0-36.9,adult          Care Instructions    Follow up with me in 6 months.     If you can, it is helpful if you fill out a survey about your clinic visit if it is mailed to your house in a few weeks              Follow-ups after your visit        Who to contact     If you have questions or need follow up information about today's clinic visit or your schedule please contact [facility] directly at [phone].  Normal or non-critical lab and imaging results will be communicated to you by DocSperahart, letter or phone within 4 business days after the clinic has received the results. If you do not hear from us within 7 days, please contact the clinic through DocSperahart or phone. If you have a critical or abnormal lab result, we will notify you by phone as soon as possible.  Submit refill requests through theScore or call your pharmacy and they will forward the refill request to us. Please allow 3 business days for your refill to be completed.          Additional Information About Your Visit        MyChart Information     theScore gives you secure access to your electronic health record. If you see a primary care provider, you can also send messages to your care team and make appointments. If you have questions, please call your primary care clinic.  If you do not have a primary care provider, please call [phone] and they will assist you.        Care EveryWhere ID     This is your Care EveryWhere ID. This could be used by  "other organizations to access your [facility] medical records  [id number]        Your Vitals Were     Pulse Temperature Height BMI (Body Mass Index)          96 98.4  F (36.9  C) (Oral) 5' 7.25\" (1.708 m) 36.28 kg/m2         Blood Pressure from Last 3 Encounters:   08/31/18 118/74   10/05/17 112/70   07/25/17 110/64    Weight from Last 3 Encounters:   08/31/18 233 lb 6 oz (105.9 kg)   10/05/17 207 lb (93.9 kg)   07/25/17 216 lb 3.2 oz (98.1 kg)              We Performed the Following     Basic metabolic panel  (Ca, Cl, CO2, Creat, Gluc, K, Na, BUN)     TSH WITH FREE T4 REFLEX          Today's Medication Changes          These changes are accurate as of 8/31/18  9:53 AM.  If you have any questions, ask your nurse or doctor.               These medicines have changed or have updated prescriptions.        Dose/Directions    * busPIRone 5 MG tablet   Commonly known as:  BUSPAR   This may have changed:  Another medication with the same name was added. Make sure you understand how and when to take each.   Used for:  Major depressive disorder, recurrent episode, moderate (H), DAYANARA (generalized anxiety disorder)   Changed by:  Silvana Patton DO        TAKE THREE TABLETS BY MOUTH TWICE A DAY   Quantity:  540 tablet   Refills:  0       * busPIRone 15 MG tablet   Commonly known as:  BUSPAR   This may have changed:  You were already taking a medication with the same name, and this prescription was added. Make sure you understand how and when to take each.   Used for:  DAYANARA (generalized anxiety disorder)   Changed by:  Silvana Patton DO        Dose:  15 mg   Take 1 tablet (15 mg) by mouth 2 times daily   Quantity:  180 tablet   Refills:  1       * Notice:  This list has 2 medication(s) that are the same as other medications prescribed for you. Read the directions carefully, and ask your doctor or other care provider to review them with you.         Where to get your medicines      These medications were sent to [facility] - [place], [place] - [address]  [address], [facility] [address]    Hours:  Mon-Fri 8:30am-5:00pm Toll Free [phone] Phone:  [phone]     buPROPion 150 MG 12 hr tablet    busPIRone 15 MG tablet                Primary Care Provider Office Phone # Fax #    Silvana Patton [phone]       [facility] [address]        Equal Access to Services     JENN ISIDRO : Hadii aad ku hadasho Soomaali, waaxda luqadaha, qaybta kaalmada adeegyada, waxay jose davidin hayraouln dallin espino . So [facility] [phone].    ATENCIÓN: Si habla español, tiene a russell disposición servicios gratuitos de asistencia lingüística. Llame al [phone].    We comply with applicable federal civil rights laws and [place] laws. We do not discriminate on the basis of race, color, national origin, age, disability, sex, sexual orientation, or gender identity.            Thank you!     Thank you for choosing [facility]  for your care. Our goal is always to provide you with excellent care. Hearing back from our patients is one way we can continue to improve our services. Please take a few minutes to complete the written survey that you may receive in the mail after your visit with us. Thank you!             Your Updated Medication List - Protect others around you: Learn how to safely use, store and throw away your medicines at www.disposemymeds.org.          This list is accurate as of 8/31/18  9:53 AM.  Always use your most recent med list.                   Brand Name Dispense Instructions for use Diagnosis    buPROPion 150 MG 12 hr tablet    WELLBUTRIN SR    270 tablet    TAKE TWO TABLETS BY MOUTH EVERY MORNING AND ONE TABLET EVERY EVENING    Major depressive disorder, recurrent episode, moderate (H)       * busPIRone 5 MG tablet    BUSPAR    540 tablet    TAKE THREE TABLETS BY MOUTH TWICE A DAY    Major depressive disorder, recurrent episode,  moderate (H), DAYANARA (generalized anxiety disorder)       * busPIRone 15 MG tablet    BUSPAR    180 tablet    Take 1 tablet (15 mg) by mouth 2 times daily    DAYANARA (generalized anxiety disorder)       * levothyroxine 25 MCG tablet    SYNTHROID/LEVOTHROID     Take 25 mcg by mouth daily Is currently taking 1/2 tablet (12.5 mcg) daily along with the 200 mcg tablet.        * levothyroxine 200 MCG tablet    SYNTHROID/LEVOTHROID    60 tablet    Take 1 tablet (200 mcg) by mouth daily Ask provider if she wants to see you or if you should see endocrine.    Acquired hypothyroidism       order for DME     1 each    Equipment being ordered: compression stockings (highest pressure) x 2    Pedal edema       VITAMIN D3 PO      Take 2,000 Units by mouth daily        * Notice:  This list has 4 medication(s) that are the same as other medications prescribed for you. Read the directions carefully, and ask your doctor or other care provider to review them with you.

## 2018-08-31 NOTE — PATIENT INSTRUCTIONS
Follow up with me in 6 months.     If you can, it is helpful if you fill out a survey about your clinic visit if it is mailed to your house in a few weeks

## 2018-09-01 LAB
ANION GAP SERPL CALCULATED.3IONS-SCNC: 8 MMOL/L (ref 3–14)
BUN SERPL-MCNC: 10 MG/DL (ref 7–30)
CALCIUM SERPL-MCNC: 8.7 MG/DL (ref 8.5–10.1)
CHLORIDE SERPL-SCNC: 108 MMOL/L (ref 94–109)
CO2 SERPL-SCNC: 26 MMOL/L (ref 20–32)
CREAT SERPL-MCNC: 0.79 MG/DL (ref 0.52–1.04)
GFR SERPL CREATININE-BSD FRML MDRD: 81 ML/MIN/1.7M2
GLUCOSE SERPL-MCNC: 87 MG/DL (ref 70–99)
POTASSIUM SERPL-SCNC: 4.9 MMOL/L (ref 3.4–5.3)
SODIUM SERPL-SCNC: 142 MMOL/L (ref 133–144)
T4 FREE SERPL-MCNC: 1.39 NG/DL (ref 0.76–1.46)
TSH SERPL DL<=0.005 MIU/L-ACNC: 0.12 MU/L (ref 0.4–4)

## 2018-09-01 ASSESSMENT — ANXIETY QUESTIONNAIRES: GAD7 TOTAL SCORE: 8

## 2018-09-01 ASSESSMENT — PATIENT HEALTH QUESTIONNAIRE - PHQ9: SUM OF ALL RESPONSES TO PHQ QUESTIONS 1-9: 4

## 2018-09-04 ENCOUNTER — MYC MEDICAL ADVICE (OUTPATIENT)
Dept: FAMILY MEDICINE | Facility: CLINIC | Age: 38
End: 2018-09-04

## 2018-09-04 DIAGNOSIS — E03.9 ACQUIRED HYPOTHYROIDISM: ICD-10-CM

## 2018-09-04 DIAGNOSIS — E89.0 POSTOPERATIVE HYPOTHYROIDISM: Primary | ICD-10-CM

## 2018-09-04 RX ORDER — LEVOTHYROXINE SODIUM 200 UG/1
200 TABLET ORAL DAILY
Qty: 60 TABLET | Refills: 0 | Status: SHIPPED | OUTPATIENT
Start: 2018-09-04 | End: 2019-10-04

## 2019-02-08 DIAGNOSIS — F33.1 MAJOR DEPRESSIVE DISORDER, RECURRENT EPISODE, MODERATE (H): ICD-10-CM

## 2019-02-08 NOTE — TELEPHONE ENCOUNTER
"Requested Prescriptions   Pending Prescriptions Disp Refills     buPROPion (WELLBUTRIN SR) 150 MG 12 hr tablet  Last Written Prescription Date:  8/31/2018  Last Fill Quantity: 270 tablet,  # refills: 1   Last office visit: 8/31/2018 with prescribing provider:  ALFREDO Patton   Future Office Visit:     270 tablet 1     Sig: TAKE TWO TABLETS BY MOUTH EVERY MORNING AND ONE TABLET EVERY EVENING    SSRIs Protocol Passed - 2/8/2019  3:47 PM       Passed - PHQ-9 score less than 5 in past 6 months    Please review last PHQ-9 score.     PHQ-9 SCORE 9/2/2016 7/25/2017 8/31/2018   PHQ-9 Total Score - - -   PHQ-9 Total Score 3 2 4     DAYANARA-7 SCORE 10/29/2012 8/2/2016 8/31/2018   Total Score 18 - -   Total Score - 11 8          Passed - Medication is Bupropion    If the medication is Bupropion (Wellbutrin), and the patient is taking for smoking cessation; OK to refill.         Passed - Medication is active on med list       Passed - Patient is age 18 or older       Passed - No active pregnancy on record       Passed - No positive pregnancy test in last 12 months       Passed - Recent (6 mo) or future (30 days) visit within the authorizing provider's specialty    Patient had office visit in the last 6 months or has a visit in the next 30 days with authorizing provider or within the authorizing provider's specialty.  See \"Patient Info\" tab in inbasket, or \"Choose Columns\" in Meds & Orders section of the refill encounter.              "

## 2019-02-11 RX ORDER — BUPROPION HYDROCHLORIDE 150 MG/1
TABLET, EXTENDED RELEASE ORAL
Qty: 90 TABLET | Refills: 0 | Status: SHIPPED | OUTPATIENT
Start: 2019-02-11 | End: 2019-06-14

## 2019-02-11 NOTE — TELEPHONE ENCOUNTER
Medication is being filled for 1 time refill only due to:  Patient needs to be seen because due for 6 month f/u appointment.     Latia Doe RN  [facility]

## 2019-04-17 ENCOUNTER — TELEPHONE (OUTPATIENT)
Dept: FAMILY MEDICINE | Facility: CLINIC | Age: 39
End: 2019-04-17

## 2019-04-17 DIAGNOSIS — E66.01 MORBID OBESITY (H): Primary | ICD-10-CM

## 2019-04-18 NOTE — TELEPHONE ENCOUNTER
----- Message from Moni Stephenson RD sent at 4/17/2019 10:01 AM CDT -----  Regarding: This patient needs a referral - on my schedule Friday  Hi Dr. Patton,    Looks like this patient requested an appointment for nutrition but she does not have a recent referral.  Can you please order a Nutrition Referral for her?    Thanks so much!  Moni Stephenson RD, LD, CDE

## 2019-05-13 ENCOUNTER — MYC MEDICAL ADVICE (OUTPATIENT)
Dept: FAMILY MEDICINE | Facility: CLINIC | Age: 39
End: 2019-05-13

## 2019-06-12 ENCOUNTER — MYC REFILL (OUTPATIENT)
Dept: FAMILY MEDICINE | Facility: CLINIC | Age: 39
End: 2019-06-12

## 2019-06-12 ENCOUNTER — MYC MEDICAL ADVICE (OUTPATIENT)
Dept: FAMILY MEDICINE | Facility: CLINIC | Age: 39
End: 2019-06-12

## 2019-06-12 DIAGNOSIS — F41.1 GAD (GENERALIZED ANXIETY DISORDER): ICD-10-CM

## 2019-06-12 RX ORDER — BUSPIRONE HYDROCHLORIDE 15 MG/1
15 TABLET ORAL 2 TIMES DAILY
Qty: 180 TABLET | Refills: 1 | Status: CANCELLED | OUTPATIENT
Start: 2019-06-12

## 2019-06-13 ENCOUNTER — E-VISIT (OUTPATIENT)
Dept: FAMILY MEDICINE | Facility: CLINIC | Age: 39
End: 2019-06-13
Payer: COMMERCIAL

## 2019-06-13 ENCOUNTER — MYC MEDICAL ADVICE (OUTPATIENT)
Dept: FAMILY MEDICINE | Facility: CLINIC | Age: 39
End: 2019-06-13

## 2019-06-13 ENCOUNTER — MYC REFILL (OUTPATIENT)
Dept: FAMILY MEDICINE | Facility: CLINIC | Age: 39
End: 2019-06-13

## 2019-06-13 DIAGNOSIS — F33.1 MAJOR DEPRESSIVE DISORDER, RECURRENT EPISODE, MODERATE (H): ICD-10-CM

## 2019-06-13 DIAGNOSIS — F41.1 GAD (GENERALIZED ANXIETY DISORDER): ICD-10-CM

## 2019-06-13 DIAGNOSIS — F41.1 GAD (GENERALIZED ANXIETY DISORDER): Primary | ICD-10-CM

## 2019-06-13 DIAGNOSIS — F32.1 MODERATE MAJOR DEPRESSION (H): ICD-10-CM

## 2019-06-13 PROCEDURE — 99444 ZZC PHYSICIAN ONLINE EVALUATION & MANAGEMENT SERVICE: CPT | Performed by: FAMILY MEDICINE

## 2019-06-13 RX ORDER — BUPROPION HYDROCHLORIDE 150 MG/1
TABLET, EXTENDED RELEASE ORAL
Qty: 90 TABLET | Refills: 0 | Status: CANCELLED | OUTPATIENT
Start: 2019-06-13

## 2019-06-13 RX ORDER — BUSPIRONE HYDROCHLORIDE 15 MG/1
15 TABLET ORAL 2 TIMES DAILY
Qty: 180 TABLET | Refills: 1 | Status: CANCELLED | OUTPATIENT
Start: 2019-06-13

## 2019-06-13 ASSESSMENT — ANXIETY QUESTIONNAIRES
7. FEELING AFRAID AS IF SOMETHING AWFUL MIGHT HAPPEN: SEVERAL DAYS
7. FEELING AFRAID AS IF SOMETHING AWFUL MIGHT HAPPEN: SEVERAL DAYS
5. BEING SO RESTLESS THAT IT IS HARD TO SIT STILL: MORE THAN HALF THE DAYS
1. FEELING NERVOUS, ANXIOUS, OR ON EDGE: NEARLY EVERY DAY
2. NOT BEING ABLE TO STOP OR CONTROL WORRYING: MORE THAN HALF THE DAYS
GAD7 TOTAL SCORE: 16
GAD7 TOTAL SCORE: 16
3. WORRYING TOO MUCH ABOUT DIFFERENT THINGS: MORE THAN HALF THE DAYS
GAD7 TOTAL SCORE: 16
4. TROUBLE RELAXING: NEARLY EVERY DAY
6. BECOMING EASILY ANNOYED OR IRRITABLE: NEARLY EVERY DAY

## 2019-06-13 ASSESSMENT — PATIENT HEALTH QUESTIONNAIRE - PHQ9
SUM OF ALL RESPONSES TO PHQ QUESTIONS 1-9: 5
10. IF YOU CHECKED OFF ANY PROBLEMS, HOW DIFFICULT HAVE THESE PROBLEMS MADE IT FOR YOU TO DO YOUR WORK, TAKE CARE OF THINGS AT HOME, OR GET ALONG WITH OTHER PEOPLE: SOMEWHAT DIFFICULT
SUM OF ALL RESPONSES TO PHQ QUESTIONS 1-9: 5

## 2019-06-13 NOTE — TELEPHONE ENCOUNTER
Huddled with PCP who requested that patient schedule an e-visit for refills of both medications.   Reached out to patient to relay message with understanding voiced. Patient will request an e-visit today.   Closing encounter.     Etta Guillen RN

## 2019-06-13 NOTE — TELEPHONE ENCOUNTER
"Routing to PCP as FYI. Patient will be setting up a E-visit with you today for refills for both medications.     Etta Guillen, RN      Requested Prescriptions   Pending Prescriptions Disp Refills     busPIRone (BUSPAR) 15 MG tablet 180 tablet 1     Sig: Take 1 tablet (15 mg) by mouth 2 times daily       Atypical Antidepressants Protocol Failed - 6/13/2019  1:13 PM        Failed - Patient has PHQ-9 score less than 5 in past 6 months.     Please review last PHQ-9 score.           Failed - Recent (6 mo) or future (30 days) visit within the authorizing provider's specialty     Patient had office visit in the last 6 months or has a visit in the next 30 days with authorizing provider or within the authorizing provider's specialty.  See \"Patient Info\" tab in inbasket, or \"Choose Columns\" in Meds & Orders section of the refill encounter.            Passed - Medication active on med list        Passed - Patient is age 18 or older        Passed - No active pregnancy on record        Passed - No positive pregnancy test in past 12 mos        buPROPion (WELLBUTRIN SR) 150 MG 12 hr tablet 90 tablet 0     Sig: TAKE TWO TABLETS BY MOUTH EVERY MORNING AND ONE TABLET EVERY EVENING       SSRIs Protocol Failed - 6/13/2019  1:13 PM        Failed - PHQ-9 score less than 5 in past 6 months     Please review last PHQ-9 score.           Failed - Recent (6 mo) or future (30 days) visit within the authorizing provider's specialty     Patient had office visit in the last 6 months or has a visit in the next 30 days with authorizing provider or within the authorizing provider's specialty.  See \"Patient Info\" tab in inWidespaceet, or \"Choose Columns\" in Meds & Orders section of the refill encounter.            Passed - Medication is Bupropion     If the medication is Bupropion (Wellbutrin), and the patient is taking for smoking cessation; OK to refill.          Passed - Medication is active on med list        Passed - Patient is age 18 or older        " Passed - No active pregnancy on record        Passed - No positive pregnancy test in last 12 months

## 2019-06-14 ENCOUNTER — OFFICE VISIT (OUTPATIENT)
Dept: NUTRITION | Facility: CLINIC | Age: 39
End: 2019-06-14
Payer: COMMERCIAL

## 2019-06-14 ENCOUNTER — MYC REFILL (OUTPATIENT)
Dept: FAMILY MEDICINE | Facility: CLINIC | Age: 39
End: 2019-06-14

## 2019-06-14 DIAGNOSIS — F50.89 COMPULSIVE OVEREATING: Primary | ICD-10-CM

## 2019-06-14 DIAGNOSIS — E66.9 OBESITY: ICD-10-CM

## 2019-06-14 DIAGNOSIS — F41.1 GAD (GENERALIZED ANXIETY DISORDER): ICD-10-CM

## 2019-06-14 PROCEDURE — 97802 MEDICAL NUTRITION INDIV IN: CPT

## 2019-06-14 RX ORDER — BUSPIRONE HYDROCHLORIDE 15 MG/1
15 TABLET ORAL 2 TIMES DAILY
Qty: 180 TABLET | Refills: 1 | Status: SHIPPED | OUTPATIENT
Start: 2019-06-14 | End: 2019-10-03

## 2019-06-14 RX ORDER — BUPROPION HYDROCHLORIDE 150 MG/1
TABLET, EXTENDED RELEASE ORAL
Qty: 270 TABLET | Refills: 1 | Status: SHIPPED | OUTPATIENT
Start: 2019-06-14 | End: 2019-06-14

## 2019-06-14 RX ORDER — BUSPIRONE HYDROCHLORIDE 15 MG/1
15 TABLET ORAL 2 TIMES DAILY
Qty: 180 TABLET | Refills: 1 | Status: CANCELLED | OUTPATIENT
Start: 2019-06-14

## 2019-06-14 ASSESSMENT — PATIENT HEALTH QUESTIONNAIRE - PHQ9: SUM OF ALL RESPONSES TO PHQ QUESTIONS 1-9: 5

## 2019-06-14 ASSESSMENT — ANXIETY QUESTIONNAIRES: GAD7 TOTAL SCORE: 16

## 2019-06-14 NOTE — PATIENT INSTRUCTIONS
1 cup soup: 1 veg, 2 protein, 1 starch  1/2 cup chili: 1/2 veg, 2 protein, 1 starch  1/2 cup potato salad: 1 starch, 1-2 fat (2 if heavy dressing)  1/2 cup coleslaw: 1/2 veg, 1-2 fat (2 if heavy dressing)  Tomato and lettuce: free/part of veggie serving if needed   food- 1 cup mixed dish with beans: 2-4 protein, 1 veggie, 2 fat.  Measure 1/2-1 cup rice for starch servings  Chipotle-  1/2 cup rice, 1/2 cup beans, tofu/steak: 1 starch, 3 protein.  Add more veggies and limit added fat  Sushi - 4 rolls: 1 starch, 1-2 protein (depending on roll/size of meat with it)

## 2019-06-14 NOTE — PROGRESS NOTES
Medical Nutrition Therapy  Visit Type:Initial assessment and intervention    Keena Tobias presents today for MNT and education related to weight management and compulsive overeating.   She is accompanied by self.     ASSESSMENT:   Patient comments/concerns relating to nutrition: says meal plan worked in the past but hard with children.  Wants to know how to measure one-pot meals such as casseroles and soups.  Has 3 children at home so not work to measure out meat separately.  Not sure what to do when eating out such as Sushi or Chipotle or  restaurant.  Wants to know how to order at ethnic places.     Says sugar-free version of condiments.  Says she does OA How.  She does not do substitutions and will stick to her meal plan (I.e. If eats another starch at lunch, does not cut out fruit).     NUTRITION HISTORY:    Breakfast: 8oz yogurt (plain, Greek), 1/2 cup quinoa, 1/2 cup fruit, 1 egg, 1/2 oz cheese  AM: 4oz carrot and PB  Lunch: 3 oz beef, 1 whole wheat bun, 1 cup veggies, sour cream, 1/2 fruit  Dinner: 4oz meat, bun, veggies, 1/2 cup barley, 2 oz sour cream and small fruit (peach)  Snacks: 8 oz yogurt and 1 cup fruit    Beverages: Decaf tea and Water all day and sparkling water    Misses meals? none  Eats out:  0 meals/per week     Previous diet education:  Yes     Food allergies/intolerances: none    Diet is high in: sodium when eating out  Diet is low in: calcium    EXERCISE: Walking children to park - twin 8 year old and a 4 year old     SOCIO/ECONOMIC:   Lives with: spouse and children    MEDICATIONS:  Current Outpatient Medications   Medication     buPROPion (WELLBUTRIN SR) 150 MG 12 hr tablet     busPIRone (BUSPAR) 15 MG tablet     Cholecalciferol (VITAMIN D3 PO)     levothyroxine (SYNTHROID/LEVOTHROID) 200 MCG tablet     order for DME     No current facility-administered medications for this visit.        LABS:  Last Basic Metabolic Panel:  Lab Results   Component Value Date      "08/31/2018      Lab Results   Component Value Date    POTASSIUM 4.9 08/31/2018     Lab Results   Component Value Date    CHLORIDE 108 08/31/2018     Lab Results   Component Value Date    AURY 8.7 08/31/2018     Lab Results   Component Value Date    CO2 26 08/31/2018     Lab Results   Component Value Date    BUN 10 08/31/2018     Lab Results   Component Value Date    CR 0.79 08/31/2018     Lab Results   Component Value Date    GLC 87 08/31/2018       ANTHROPOMETRICS:  Vitals: There were no vitals taken for this visit.  Estimated body mass index is 36.28 kg/m  as calculated from the following:    Height as of 8/31/18: 1.708 m (5' 7.25\").    Weight as of 8/31/18: 105.9 kg (233 lb 6 oz).      Wt Readings from Last 5 Encounters:   08/31/18 105.9 kg (233 lb 6 oz)   10/05/17 93.9 kg (207 lb)   07/25/17 98.1 kg (216 lb 3.2 oz)   09/02/16 97.5 kg (215 lb)   08/02/16 97.6 kg (215 lb 4 oz)       Weight Change: not assessed today- patient struggles with eating disorder and tries to restrict weighing to only 1 time a month.      ESTIMATED KCAL REQUIREMENTS:  Based on last weight in record: 2080 kcal per day    NUTRITION DIAGNOSIS: Overweight/Obesity related to excessive energy intake as evidenced by BMI and patient reporting compulsive over-eating when not following her OA meal plan.     NUTRITION INTERVENTION:  Nutrition Prescription: Continue to Follow OA How Meal Plan:    Education given to support: general nutrition guidelines, consistent meals, fat modification, exercise, fiber, behavior modification, portion control and exchanges for combination foods (OA considers 1 starch serving = 1/2 cup, not 1/3 cup with diabetes exchange list, so this was used when talking about what she is getting per serving from starch, veggie and protein)  Motivational Interviewing    Discussion: Mostly has questions today regarding how to make combination and ethnic foods fit into her meal plan from OA How. Answered her questions on how to count " soup, chili,  cuisine, beans, stir fries and higher fat condiments such as potato salad and coleslaw.  Answered her other questions regarding condiments and told her not need to worry about counting tomato slice or lettuce on burger unless wants to include as part of vegetable intake.  She had her questions on her phone and took notes regarding how to count in her meal plan so did not need another copy/print out on what was discussed. Commended her on trying to include activity with her children and try to keep it up yearly since may assist weight loss progress and help achieve goals better.  Pt verbalized understanding of concepts discussed and recommendations provided.         PATIENT'S BEHAVIOR CHANGE GOALS:   See Patient Instructions for patient stated behavior change goals. AVS was printed and given to patient at today's appointment.    MONITOR / EVALUATE:  RD will monitor/evaluate:  Progress toward meeting stated nutrition-related goals  Weight change    FOLLOW-UP:  Follow up with RD as needed.  Call RD with questions/concerns.  Says may return for follow up when reaches her goal weight for assistance on maintaining weight.    Moni Stephenson RD, LD, CDE   Time spent in minutes: 30 minutes  Encounter: Individual

## 2019-06-17 ENCOUNTER — TELEPHONE (OUTPATIENT)
Dept: FAMILY MEDICINE | Facility: CLINIC | Age: 39
End: 2019-06-17

## 2019-06-17 NOTE — TELEPHONE ENCOUNTER
Prior Authorization Retail Medication Request    Medication/Dose: bupropion dosing  ICD code (if different than what is on RX):    Previously Tried and Failed:    Rationale:      Insurance Name:  [employer]erred [employer]  Insurance ID:  [id number]      Pharmacy Information (if different than what is on RX)  Name:    Phone:

## 2019-06-18 NOTE — TELEPHONE ENCOUNTER
PA Initiation    Medication: bupropion dosing- INITIATED  Insurance Company: [employer] - Phone [phone] Fax [phone]  Pharmacy Filling the Rx: [place], [place] - [address] [place] [address] [place]  Filling Pharmacy Phone: [phone]  Filling Pharmacy Fax:    Start Date: 6/18/2019

## 2019-06-19 NOTE — TELEPHONE ENCOUNTER
Prior Authorization Approval    Authorization Effective Date: 6/19/2019  Authorization Expiration Date: 6/24/2020  Medication: bupropion dosing- APPROVED  Approved Dose/Quantity:   Reference #:     Insurance Company: [employer] - Phone [phone] Fax [phone]  Expected CoPay:       CoPay Card Available:      Foundation Assistance Needed:    Which Pharmacy is filling the prescription (Not needed for infusion/clinic administered): [facility] - [place], [place] - [address] [place] [address]  Pharmacy Notified: Yes  Patient Notified: Yes  **Instructed pharmacy to notify patient when script is ready to /ship.**

## 2019-07-25 ENCOUNTER — MYC MEDICAL ADVICE (OUTPATIENT)
Dept: EDUCATION SERVICES | Facility: CLINIC | Age: 39
End: 2019-07-25

## 2019-07-25 NOTE — TELEPHONE ENCOUNTER
"Keena was seen on 6/14 for a nutrition appointment to address healthy weight and compulsive overeating.   She sends a [employer] message today with 2 questions:  Joss Montenegro,    I have a few questions about my meal plan for you.  Thank you for your help!    1) hummus is currently just a protein.  Can it also be a fat?      2) I found a sugar free granola mix.  How would I treat this?  A starch?  Also, would I just do a serving of what's on the bag?   I attached both the ingredient list and the nutrition label.    Thank you!  I appreciate your help!    RD reply:  Moni Medina is out of the office today so I'm helping out, don't want you to have to wait!  Good questions!     Regarding the hummus, it depends a little on the brand, but typically it works best in a meal plan to count 2 TBS hummus as 1 fat serving, not a protein. It does have a trace amount of protein, but not enough to be a \"serving\".     With the new cereal, because it has seeds and coconut, the 1/3 cup serving would count as 1 starch and 2 fat servings. The seeds, nuts, flax are great fiber sources, but also high in fat so calories add up quickly.  Thank you for sending the label, very helpful to see the details!    Best, Cecy Walter RD, LD, CDE  "

## 2019-08-30 ENCOUNTER — TRANSFERRED RECORDS (OUTPATIENT)
Dept: HEALTH INFORMATION MANAGEMENT | Facility: CLINIC | Age: 39
End: 2019-08-30

## 2019-10-03 ENCOUNTER — OFFICE VISIT (OUTPATIENT)
Dept: FAMILY MEDICINE | Facility: CLINIC | Age: 39
End: 2019-10-03
Payer: COMMERCIAL

## 2019-10-03 VITALS
BODY MASS INDEX: 35.31 KG/M2 | DIASTOLIC BLOOD PRESSURE: 66 MMHG | HEIGHT: 67 IN | WEIGHT: 225 LBS | HEART RATE: 80 BPM | TEMPERATURE: 98.4 F | SYSTOLIC BLOOD PRESSURE: 108 MMHG

## 2019-10-03 DIAGNOSIS — F33.1 MAJOR DEPRESSIVE DISORDER, RECURRENT EPISODE, MODERATE (H): Primary | ICD-10-CM

## 2019-10-03 DIAGNOSIS — F41.1 GAD (GENERALIZED ANXIETY DISORDER): ICD-10-CM

## 2019-10-03 DIAGNOSIS — E89.0 POSTOPERATIVE HYPOTHYROIDISM: ICD-10-CM

## 2019-10-03 DIAGNOSIS — R60.0 PEDAL EDEMA: ICD-10-CM

## 2019-10-03 PROCEDURE — 84439 ASSAY OF FREE THYROXINE: CPT | Performed by: FAMILY MEDICINE

## 2019-10-03 PROCEDURE — 84443 ASSAY THYROID STIM HORMONE: CPT | Performed by: FAMILY MEDICINE

## 2019-10-03 PROCEDURE — 80048 BASIC METABOLIC PNL TOTAL CA: CPT | Performed by: FAMILY MEDICINE

## 2019-10-03 PROCEDURE — 36415 COLL VENOUS BLD VENIPUNCTURE: CPT | Performed by: FAMILY MEDICINE

## 2019-10-03 PROCEDURE — 99214 OFFICE O/P EST MOD 30 MIN: CPT | Performed by: FAMILY MEDICINE

## 2019-10-03 RX ORDER — CLINDAMYCIN PHOSPHATE 10 MG/G
GEL TOPICAL
COMMUNITY
End: 2022-05-27

## 2019-10-03 RX ORDER — BUPROPION HYDROCHLORIDE 150 MG/1
TABLET, EXTENDED RELEASE ORAL
Qty: 270 TABLET | Refills: 1 | Status: SHIPPED | OUTPATIENT
Start: 2019-10-03 | End: 2020-03-26

## 2019-10-03 RX ORDER — LEVOTHYROXINE SODIUM 25 UG/1
12 TABLET ORAL DAILY
COMMUNITY
End: 2019-10-03

## 2019-10-03 RX ORDER — SPIRONOLACTONE 50 MG/1
50 TABLET, FILM COATED ORAL DAILY
COMMUNITY
End: 2020-11-03

## 2019-10-03 RX ORDER — BUSPIRONE HYDROCHLORIDE 15 MG/1
15 TABLET ORAL 2 TIMES DAILY
Qty: 180 TABLET | Refills: 1 | Status: SHIPPED | OUTPATIENT
Start: 2019-10-03 | End: 2020-03-26

## 2019-10-03 ASSESSMENT — MIFFLIN-ST. JEOR: SCORE: 1732.18

## 2019-10-03 NOTE — PROGRESS NOTES
Subjective     Keena Tobias is a 39 year old female who presents to clinic today for the following health issues:    HPI   Depression Follow-up  Patient notes that the medication is working well with no negative side-effects. She is compliant with the prescription. Currently she is taking Wellbutrin (150 mg) twice in the morning, and once at night. She is also taking Buspar (15 mg) twice daily.     How are you doing with your depression since your last visit? No change    Are you having other symptoms that might be associated with depression? No    Have you had a significant life event?  No     Are you feeling anxious or having panic attacks?   Yes:      Do you have any concerns with your use of alcohol or other drugs? No    Social History     Tobacco Use     Smoking status: Former Smoker     Smokeless tobacco: Never Used     Tobacco comment: smoked from ages [age]   Substance Use Topics     Alcohol use: No     Drug use: No     PHQ 7/25/2017 8/31/2018 6/13/2019   PHQ-9 Total Score 2 4 5   Q9: Thoughts of better off dead/self-harm past 2 weeks Not at all Not at all Not at all     DAYANARA-7 SCORE 8/2/2016 8/31/2018 6/13/2019   Total Score - - -   Total Score - - 16 (severe anxiety)   Total Score 11 8 16       Suicide Assessment Five-step Evaluation and Treatment (SAFE-T)  Hypothyroidism Follow-up  Patient's last thyroid lab was in August 2019.  She notes difficulty remembering to take her medication. However, she states there are no negative side-effects with the medication.     Since last visit, patient describes the following symptoms:   weight loss of 20 lbs    How many servings of fruits and vegetables do you eat daily?  4 or more    On average, how many sweetened beverages do you drink each day (soda, juice, sweet tea, etc)?   0  How many days per week do you miss taking your medication? 1 every other week    What makes it hard for you to take your medications?  remembering to take    Concern - foot  "pain  Patient had moderate foot pain over the summer. She believes it was plantar fascitis since the sole of her foot hurt, especially first thing in the morning.   She bought shoe insoles, wore Birkins, and bought a foot massager.   She is looking for long-term solutions, since she is looking to complete a 5K with her daughter.   She is currently able   Onset: over summer was really bad    Description:   Foot pain was really bad over the summer, she would like to know what she can do to keep it from getting bad again    Intensity: moderate    Progression of Symptoms:  improving    Accompanying Signs & Symptoms:  none    Previous history of similar problem:   none    Precipitating factors:   Worsened by: none    Alleviating factors:  Improved by: burkenstocks, foot roller, insoles  Therapies Tried and outcome: new shoes, inserts and foot roller    SKIN  Patient is on Spironolactone (50 mg) acne and rosacea. Patient sees a dermatologist for treatment.     MS  Patient is requesting highest pressure for compression stockings as treatment for verucose veins     BP Readings from Last 3 Encounters:   10/03/19 108/66   08/31/18 118/74   10/05/17 112/70    Wt Readings from Last 3 Encounters:   10/03/19 102.1 kg (225 lb)   08/31/18 105.9 kg (233 lb 6 oz)   10/05/17 93.9 kg (207 lb)         Reviewed and updated as needed this visit by Provider    Review of Systems   ROS COMP: Constitutional, HEENT, cardiovascular, pulmonary, gi and gu systems are negative, except as otherwise noted.    This document serves as a record of the services and decisions personally performed and made by Silvana Patton DO. It was created on her behalf by Armando Toro, a trained medical scribe. The creation of this document is based on the provider's statements to the medical scribe.  Armando Toro October 3, 2019 12:39 PM        Objective    /66 (Cuff Size: Adult Large)   Pulse 80   Temp 98.4  F (36.9  C) (Oral)   Ht 1.708 m (5' 7.25\")   Wt " 102.1 kg (225 lb)   LMP 09/28/2019 (Approximate)   BMI 34.98 kg/m    Body mass index is 34.98 kg/m .  Physical Exam   GENERAL: healthy, alert and no distress  EYES: Eyes grossly normal to inspection, PERRL and conjunctivae and sclerae normal  HENT: ear canals and TM's normal, nose and mouth without ulcers or lesions  NECK: no adenopathy, no asymmetry, masses, or scars and thyroid normal to palpation  RESP: lungs clear to auscultation - no rales, rhonchi or wheezes  CV: regular rate and rhythm, normal S1 S2, no S3 or S4, no murmur, click or rub, no peripheral edema and peripheral pulses strong  ABDOMEN: soft, nontender, no hepatosplenomegaly, no masses and bowel sounds normal  MS: no gross musculoskeletal defects noted, no edema; flat arches non-tender  SKIN: no suspicious lesions or rashes  NEURO: Normal strength and tone, mentation intact and speech normal  PSYCH: mentation appears normal, affect normal/bright    Diagnostic Test Results:  Labs reviewed in Epic        Assessment & Plan     1. Major depressive disorder, recurrent episode, moderate (H)  Patient has major depressive disorder which is well controlled with medication. Patient notes no negative side-effects. I have renewed her prescription today.   - buPROPion (WELLBUTRIN SR) 150 MG 12 hr tablet; TAKE TWO TABLETS BY MOUTH EVERY MORNING AND ONE TABLET EVERY EVENING  Dispense: 270 tablet; Refill: 1    2. DAYANARA (generalized anxiety disorder)  Patient has generalized anxiety disorder which is well controlled with medication. Patient notes no negative side-effects. I have renewed her prescription today.   - busPIRone (BUSPAR) 15 MG tablet; Take 1 tablet (15 mg) by mouth 2 times daily  Dispense: 180 tablet; Refill: 1    3. Pedal edema  Patient has verucose veins and is requesting high compression stockings. I have prescribed two. She is to monitor and follow-up as needed.  - Basic metabolic panel  (Ca, Cl, CO2, Creat, Gluc, K, Na, BUN)  - order for DME;  "Equipment being ordered: compression stockings (highest pressure) x 2  Dispense: 1 each; Refill: 0    4. Postoperative hypothyroidism  Patient is past due for a TSH check-up. Labs have been ordered. I will follow-up with the patient once the results are completed.   - TSH WITH FREE T4 REFLEX     BMI:   Estimated body mass index is 34.98 kg/m  as calculated from the following:    Height as of this encounter: 1.708 m (5' 7.25\").    Weight as of this encounter: 102.1 kg (225 lb).   Weight management plan: Discussed healthy diet and exercise guidelines    Patient Instructions     For your foot pain, I would recommend easing into exercise after winter break. Continue doing foot stretches to help the muscles.     I have renewed Wellbutrin and Buspar prescriptions. I have also placed an order for high compression stockings.     Return to clinic for any new or worsening symptoms or go to ER Urgent care in off hours.       Patient Education     Understanding Plantar Fasciitis    Plantar fasciitis is a condition that causes foot and heel pain. The plantar fascia is a tough band of tissue that runs across the bottom of the foot from the heel to the toes. This tissue pulls on the heel bone. It supports the arch of the foot as it pushes off the ground. If the tissue becomes irritated or red and swollen (inflamed), it is called plantar fasciitis.  How to say it  PLAN-tuhr fa-see-IY-tis   What causes plantar fasciitis?  Plantar fasciitis most often occurs from overusing the plantar fascia. The tissue may become damaged from activities that put repeated stress on the heel and foot. Or it may wear down over time with age and ankle stiffness. You are more likely to have plantar fasciitis if you:    Do activities that require a lot of running, jumping, or dancing    Have a job that requires being on your feet for long periods    Are overweight or obese    Have certain foot problems, such as a tight Achilles tendon, flat feet, or high " arches    Often wear poorly fitting shoes  Symptoms of plantar fasciitis  The condition most often causes pain in the heel and the bottom of the foot. The pain may occur when you take your first steps in the morning. It may get better as you walk throughout the day. But as you continue to put weight on the foot, the pain often returns. Pain may also occur after standing or sitting for long periods.  Treating plantar fasciitis  Treatments for plantar fasciitis include:    Resting the foot. This involves limiting movements that make your foot hurt. You may also need to avoid certain sports and types of work for a time.    Using cold packs. Put an ice pack on the heel and foot to help reduce pain and swelling.    Taking pain medicines. Prescription and over-the-counter pain medicines can help relieve pain and swelling.    Using heel cups or foot inserts (orthotics). These are placed in the shoes to help support the heel or arch and cushion the heel. You may also be told to buy proper-fitting shoes with good arch support and cushioned soles.    Taping the foot. This supports the arch and limits the movement of the plantar fascia to help relieve symptoms.    Wearing a night splint. This stretches the plantar fascia and leg muscles while you sleep. This may help relieve pain.    Doing exercises and physical therapy. These stretch and strengthen the plantar fascia and the muscles in the leg that support the heel and foot.    Getting shots of medicine into the foot. These may help relieve symptoms for a time.    Having surgery. This may be needed if other treatments fail to relieve symptoms. During surgery, the surgeon may partially cut the plantar fascia to release tension.  Possible complications of plantar fasciitis  Without proper care and treatment, healing may take longer than normal. Also, symptoms may continue or get worse. Over time, the plantar fascia may be damaged. This can make it hard to walk or even stand  without pain.  When to call your healthcare provider  Call your healthcare provider right away if you have any of these:    Fever of 100.4 F (38 C) or higher, or as directed    Symptoms that don t get better with treatment, or get worse    New symptoms, such as numbness, tingling, or weakness in the foot   Date Last Reviewed: 3/10/2016    1466-0280 [employer]. [address] [place] [address]. All rights reserved. This information is not intended as a substitute for professional medical care. Always follow your healthcare professional's instructions.               Return in about 6 weeks (around 11/14/2019) for Routine Visit.    The information in this document, created by the medical scribe, Armando Toro, for me, accurately reflects the services I personally performed and the decisions made by me. I have reviewed and approved this document for accuracy prior to leaving the patient care area.    Silvana Patton,   [facility]

## 2019-10-03 NOTE — PATIENT INSTRUCTIONS
For your foot pain, I would recommend easing into exercise after winter break. Continue doing foot stretches to help the muscles.     I have renewed Wellbutrin and Buspar prescriptions. I have also placed an order for high compression stockings.     Return to clinic for any new or worsening symptoms or go to ER Urgent care in off hours.       Patient Education     Understanding Plantar Fasciitis    Plantar fasciitis is a condition that causes foot and heel pain. The plantar fascia is a tough band of tissue that runs across the bottom of the foot from the heel to the toes. This tissue pulls on the heel bone. It supports the arch of the foot as it pushes off the ground. If the tissue becomes irritated or red and swollen (inflamed), it is called plantar fasciitis.  How to say it  PLAN-tuhr fa-see-IY-tis   What causes plantar fasciitis?  Plantar fasciitis most often occurs from overusing the plantar fascia. The tissue may become damaged from activities that put repeated stress on the heel and foot. Or it may wear down over time with age and ankle stiffness. You are more likely to have plantar fasciitis if you:    Do activities that require a lot of running, jumping, or dancing    Have a job that requires being on your feet for long periods    Are overweight or obese    Have certain foot problems, such as a tight Achilles tendon, flat feet, or high arches    Often wear poorly fitting shoes  Symptoms of plantar fasciitis  The condition most often causes pain in the heel and the bottom of the foot. The pain may occur when you take your first steps in the morning. It may get better as you walk throughout the day. But as you continue to put weight on the foot, the pain often returns. Pain may also occur after standing or sitting for long periods.  Treating plantar fasciitis  Treatments for plantar fasciitis include:    Resting the foot. This involves limiting movements that make your foot hurt. You may also need to avoid  certain sports and types of work for a time.    Using cold packs. Put an ice pack on the heel and foot to help reduce pain and swelling.    Taking pain medicines. Prescription and over-the-counter pain medicines can help relieve pain and swelling.    Using heel cups or foot inserts (orthotics). These are placed in the shoes to help support the heel or arch and cushion the heel. You may also be told to buy proper-fitting shoes with good arch support and cushioned soles.    Taping the foot. This supports the arch and limits the movement of the plantar fascia to help relieve symptoms.    Wearing a night splint. This stretches the plantar fascia and leg muscles while you sleep. This may help relieve pain.    Doing exercises and physical therapy. These stretch and strengthen the plantar fascia and the muscles in the leg that support the heel and foot.    Getting shots of medicine into the foot. These may help relieve symptoms for a time.    Having surgery. This may be needed if other treatments fail to relieve symptoms. During surgery, the surgeon may partially cut the plantar fascia to release tension.  Possible complications of plantar fasciitis  Without proper care and treatment, healing may take longer than normal. Also, symptoms may continue or get worse. Over time, the plantar fascia may be damaged. This can make it hard to walk or even stand without pain.  When to call your healthcare provider  Call your healthcare provider right away if you have any of these:    Fever of 100.4 F (38 C) or higher, or as directed    Symptoms that don t get better with treatment, or get worse    New symptoms, such as numbness, tingling, or weakness in the foot   Date Last Reviewed: 3/10/2016    6834-8910 [employer]. [address] [place] [address], [place] [address]. All rights reserved. This information is not intended as a substitute for professional medical care. Always follow your healthcare professional's  instructions.    [facility]   Discharged by : Janneth BIANCHI Certified Medical Assistant (AAMA)   Paper scripts provided to patient : 1   If you have any questions regarding to your visit please contact your care team:     Team [employer]              Clinic Hours Telephone Number     Dr. Silvana Arroyo PA-C     7am-7pm  Monday - Thursday   7am-5pm  Fridays  [phone]   (Appointment scheduling available 24/7)     RN Line  [phone] option 2     Urgent Care - [place] and [place] - 11am-9pm Monday-Friday Saturday-Sunday- 9am-5pm     [place] -   5pm-9pm Monday-Friday Saturday-Sunday- 9am-5pm    [phone] - [place]    [phone] - [place]     For a Price Quote for your services, please call our [employer] Price Line at [phone].     What options do I have for visits at the clinic other than the traditional office visit?     To expand how we care for you, many of our providers are utilizing electronic visits (e-visits) and telephone visits, when medically appropriate, for interactions with their patients rather than a visit in the clinic. We also offer nurse visits for many medical concerns. Just like any other service, we will bill your insurance company for this type of visit based on time spent on the phone with your provider. Not all insurance companies cover these visits. Please check with your medical insurance if this type of visit is covered. You will be responsible for any charges that are not paid by your insurance.   E-visits via [employer]: generally incur a $45.00 fee.     Telephone visits:  Time spent on the phone: *charged based on time that is spent on the phone in increments of 10 minutes. Estimated cost:   5-10 mins $30.00   11-20 mins. $59.00   21-30 mins. $85.00       Use [employer] (secure email communication and access to your chart) to send your primary care provider a message or make an appointment. Ask someone on your Team how to  sign up for [employer].   As always, Thank you for trusting us with your health care needs!    [facility]:    Scheduling Appointments  Shazia Magaña [facility]  Call: [phone]    Geovany Barnes [facility]  Call: [phone]    [facility]  Call: [phone]    For Gastroenterology referrals   [facility]   Clinics and Surgery Center, 4th Floor   [address] [place] [address]   Appointments: [phone]    WHERE TO GO FOR CARE?  Clinic    Make an appointment if you:       Are sick (cold, cough, flu, sore throat, earache or in pain).       Have a small injury (sprain, small cut, burn or broken bone).       Need a physical exam, Pap smear, vaccine or prescription refill.       Have questions about your health or medicines.    To reach us:      Call [phone] ([phone]). Open 24 hours every day. (For counseling services, call [phone].)    Log into [employer] at [email or web address]. (Visit [email or web address] to create an account.) Hospital emergency room    An emergency is a serious or life- threatening problem that must be treated right away.    Call 522 or get to the hospital if you have:      Very bad or sudden:            - Chest pain or pressure         - Bleeding         - Head or belly pain         - Dizziness or trouble seeing, walking or                          Speaking      Problems breathing      Blood in your vomit or you are coughing up blood      A major injury (knocked out, loss of a finger or limb, rape, broken bone protruding from skin)    A mental health crisis. (Or call the Mental Health Crisis line at 1-967.954.8195 or Suicide Prevention Hotline at 1-850.269.4253.)    Open 24 hours every day. You don't need an appointment.     Urgent care    Visit urgent care for sickness or small injuries when the clinic is closed. You don't need an appointment. To check hours or find an urgent care near  you, visit [email or web address]. Online care    Get online care from [facility] for more than 70 common problems, like colds, allergies and infections. Open 24 hours every day at:   [email or web address]   Need help deciding?    For advice about where to be seen, you may call your clinic and ask to speak with a nurse. We're here for you 24 hours every day.         If you are deaf or hard of hearing, please let us know. We provide many free services including sign language interpreters, oral interpreters, TTYs, telephone amplifiers, note takers and written materials.

## 2019-10-04 ENCOUNTER — TELEPHONE (OUTPATIENT)
Dept: FAMILY MEDICINE | Facility: CLINIC | Age: 39
End: 2019-10-04

## 2019-10-04 ENCOUNTER — MYC MEDICAL ADVICE (OUTPATIENT)
Dept: FAMILY MEDICINE | Facility: CLINIC | Age: 39
End: 2019-10-04

## 2019-10-04 DIAGNOSIS — E89.0 POSTOPERATIVE HYPOTHYROIDISM: Primary | ICD-10-CM

## 2019-10-04 LAB
ANION GAP SERPL CALCULATED.3IONS-SCNC: 9 MMOL/L (ref 3–14)
BUN SERPL-MCNC: 16 MG/DL (ref 7–30)
CALCIUM SERPL-MCNC: 8.8 MG/DL (ref 8.5–10.1)
CHLORIDE SERPL-SCNC: 105 MMOL/L (ref 94–109)
CO2 SERPL-SCNC: 25 MMOL/L (ref 20–32)
CREAT SERPL-MCNC: 0.72 MG/DL (ref 0.52–1.04)
GFR SERPL CREATININE-BSD FRML MDRD: >90 ML/MIN/{1.73_M2}
GLUCOSE SERPL-MCNC: 85 MG/DL (ref 70–99)
POTASSIUM SERPL-SCNC: 4.8 MMOL/L (ref 3.4–5.3)
SODIUM SERPL-SCNC: 139 MMOL/L (ref 133–144)
T4 FREE SERPL-MCNC: 1.64 NG/DL (ref 0.76–1.46)
TSH SERPL DL<=0.005 MIU/L-ACNC: <0.01 MU/L (ref 0.4–4)

## 2019-10-04 RX ORDER — LEVOTHYROXINE SODIUM 175 UG/1
175 TABLET ORAL DAILY
Qty: 60 TABLET | Refills: 0 | Status: SHIPPED | OUTPATIENT
Start: 2019-10-04 | End: 2020-03-26 | Stop reason: DRUGHIGH

## 2019-10-04 NOTE — TELEPHONE ENCOUNTER
Sugey Brink,    Thank you for getting your labs done.  They results show your thyroid is over treated on their current dose of synthroid.  We will need to reduce the synthroid from 200 mcg to 175 mcg.  We will need to recheck this lab test in 6-8 weeks.  Please make sure to take this medication first thing when you wake up and do not take other medications, eat food, or drink anything other than water for 30 minutes after taking this pill.   I have called a new prescription for this into the pharmacy we have on file.    Feel free to email or call if you have any questions.    Have a nice day,      Silvana Patton D.O.

## 2019-10-08 NOTE — TELEPHONE ENCOUNTER
Central Prior Authorization Team   Phone: [phone]      PA NOT NEEDED    Medication: buPROPion-PA NOT NEEDED  Insurance Company: [employer] - Phone [phone] Fax [phone]  Pharmacy Filling the Rx:    Filling Pharmacy Phone:    Filling Pharmacy Fax:    Start Date: 10/8/2019    PA not needed.  The patient can only get a 30 day supply not a 90 day.  The pharmacy was able to process 90 tablets for a 30 day supply and it went through insurance.

## 2019-10-25 ENCOUNTER — TRANSFERRED RECORDS (OUTPATIENT)
Dept: HEALTH INFORMATION MANAGEMENT | Facility: CLINIC | Age: 39
End: 2019-10-25

## 2019-11-07 ENCOUNTER — IMMUNIZATION (OUTPATIENT)
Dept: NURSING | Facility: CLINIC | Age: 39
End: 2019-11-07
Payer: COMMERCIAL

## 2019-11-07 ENCOUNTER — HEALTH MAINTENANCE LETTER (OUTPATIENT)
Age: 39
End: 2019-11-07

## 2019-11-07 PROCEDURE — 90686 IIV4 VACC NO PRSV 0.5 ML IM: CPT

## 2019-11-07 PROCEDURE — 90471 IMMUNIZATION ADMIN: CPT

## 2020-01-10 ENCOUNTER — MYC MEDICAL ADVICE (OUTPATIENT)
Dept: FAMILY MEDICINE | Facility: CLINIC | Age: 40
End: 2020-01-10

## 2020-01-10 NOTE — TELEPHONE ENCOUNTER
Mail order pharmacy called and reports that her Buspar 15 mg prescription was never filled.  They will call her for additional information.    [employer] message sent. May close once read with no response.      Saundra Angulo RN

## 2020-01-13 NOTE — TELEPHONE ENCOUNTER
Patient read [employer] message with no reply.  Will close encounter.      Edson Rodriguez RN

## 2020-02-06 ENCOUNTER — MYC MEDICAL ADVICE (OUTPATIENT)
Dept: LAB | Facility: CLINIC | Age: 40
End: 2020-02-06

## 2020-02-14 ENCOUNTER — TRANSFERRED RECORDS (OUTPATIENT)
Dept: HEALTH INFORMATION MANAGEMENT | Facility: CLINIC | Age: 40
End: 2020-02-14

## 2020-02-14 ENCOUNTER — HOSPITAL ENCOUNTER (OUTPATIENT)
Dept: MAMMOGRAPHY | Facility: CLINIC | Age: 40
Discharge: HOME | End: 2020-02-14

## 2020-02-14 DIAGNOSIS — Z12.31 VISIT FOR SCREENING MAMMOGRAM: ICD-10-CM

## 2020-03-11 ENCOUNTER — TELEPHONE (OUTPATIENT)
Dept: LAB | Facility: CLINIC | Age: 40
End: 2020-03-11

## 2020-03-14 ENCOUNTER — AMBULATORY - HEALTHEAST (OUTPATIENT)
Dept: LAB | Facility: HOSPITAL | Age: 40
End: 2020-03-14

## 2020-03-14 DIAGNOSIS — E05.00 FLAJANI DISEASE: ICD-10-CM

## 2020-03-14 DIAGNOSIS — E89.0 POSTSURGICAL HYPOTHYROIDISM: ICD-10-CM

## 2020-03-14 LAB
T4 FREE SERPL-MCNC: 1.2 NG/DL (ref 0.7–1.8)
TSH SERPL DL<=0.005 MIU/L-ACNC: 0.2 UIU/ML (ref 0.3–5)

## 2020-03-18 ENCOUNTER — MYC MEDICAL ADVICE (OUTPATIENT)
Dept: FAMILY MEDICINE | Facility: CLINIC | Age: 40
End: 2020-03-18

## 2020-03-18 DIAGNOSIS — E89.0 POSTOPERATIVE HYPOTHYROIDISM: Primary | ICD-10-CM

## 2020-03-18 NOTE — TELEPHONE ENCOUNTER
See patient's response, says her labs were done at [facility].   Routed to provider, can you access these lab results?   Patient wonders if med needs adjustment (see her MyChart message regarding thyroid).    Stephany Santacruz RN  [facility]

## 2020-03-18 NOTE — TELEPHONE ENCOUNTER
I don't see any thyroid levels since October 2019, patient states had levels done last week, must be in another system.  Routed message to patient to clarify where labs were done.    Stephany Santacruz RN  [facility]

## 2020-03-19 NOTE — TELEPHONE ENCOUNTER
We will need to do a care everywhere to be able to see her labs from an outside clinic.  We need her authorization for this.    Silvana Patton D.O.

## 2020-03-19 NOTE — TELEPHONE ENCOUNTER
Flagged for TC. Can we get a care everywhere consent  through mychart? Thank you.     Dominique Caba RN

## 2020-03-20 RX ORDER — LEVOTHYROXINE SODIUM 150 UG/1
150 TABLET ORAL DAILY
Qty: 60 TABLET | Refills: 0 | Status: SHIPPED | OUTPATIENT
Start: 2020-03-20 | End: 2020-03-26 | Stop reason: DRUGHIGH

## 2020-03-20 NOTE — TELEPHONE ENCOUNTER
Routed to PCP - see lab results in Care everywhere - [facility]. Now viewable.     See original "[employer] message as well.     Ethel Mclaughlin, RN, BSN, PHN  [facility] [place]

## 2020-03-26 ENCOUNTER — VIRTUAL VISIT (OUTPATIENT)
Dept: FAMILY MEDICINE | Facility: CLINIC | Age: 40
End: 2020-03-26
Payer: COMMERCIAL

## 2020-03-26 DIAGNOSIS — F41.1 GAD (GENERALIZED ANXIETY DISORDER): Primary | ICD-10-CM

## 2020-03-26 DIAGNOSIS — F33.1 MAJOR DEPRESSIVE DISORDER, RECURRENT EPISODE, MODERATE (H): ICD-10-CM

## 2020-03-26 PROCEDURE — 96127 BRIEF EMOTIONAL/BEHAV ASSMT: CPT | Mod: 59 | Performed by: FAMILY MEDICINE

## 2020-03-26 PROCEDURE — 99214 OFFICE O/P EST MOD 30 MIN: CPT | Mod: TEL | Performed by: FAMILY MEDICINE

## 2020-03-26 RX ORDER — BUPROPION HYDROCHLORIDE 150 MG/1
TABLET, EXTENDED RELEASE ORAL
Qty: 270 TABLET | Refills: 1 | Status: SHIPPED | OUTPATIENT
Start: 2020-03-26 | End: 2020-10-16

## 2020-03-26 RX ORDER — BUSPIRONE HYDROCHLORIDE 15 MG/1
15 TABLET ORAL 2 TIMES DAILY
Qty: 180 TABLET | Refills: 1 | Status: SHIPPED | OUTPATIENT
Start: 2020-03-26 | End: 2020-10-16

## 2020-03-26 RX ORDER — LEVOTHYROXINE SODIUM 200 UG/1
200 TABLET ORAL EVERY OTHER DAY
COMMUNITY
End: 2022-08-15

## 2020-03-26 ASSESSMENT — ANXIETY QUESTIONNAIRES
3. WORRYING TOO MUCH ABOUT DIFFERENT THINGS: NEARLY EVERY DAY
5. BEING SO RESTLESS THAT IT IS HARD TO SIT STILL: NOT AT ALL
2. NOT BEING ABLE TO STOP OR CONTROL WORRYING: SEVERAL DAYS
7. FEELING AFRAID AS IF SOMETHING AWFUL MIGHT HAPPEN: SEVERAL DAYS
GAD7 TOTAL SCORE: 14
IF YOU CHECKED OFF ANY PROBLEMS ON THIS QUESTIONNAIRE, HOW DIFFICULT HAVE THESE PROBLEMS MADE IT FOR YOU TO DO YOUR WORK, TAKE CARE OF THINGS AT HOME, OR GET ALONG WITH OTHER PEOPLE: SOMEWHAT DIFFICULT
6. BECOMING EASILY ANNOYED OR IRRITABLE: NEARLY EVERY DAY
1. FEELING NERVOUS, ANXIOUS, OR ON EDGE: NEARLY EVERY DAY

## 2020-03-26 ASSESSMENT — PATIENT HEALTH QUESTIONNAIRE - PHQ9
5. POOR APPETITE OR OVEREATING: NEARLY EVERY DAY
SUM OF ALL RESPONSES TO PHQ QUESTIONS 1-9: 12

## 2020-03-26 NOTE — PROGRESS NOTES
"Keena Tobias is a 40 year old female who is being evaluated via a billable telephone visit.      The patient has been notified of following:     \"This telephone visit will be conducted via a call between you and your physician/provider. We have found that certain health care needs can be provided without the need for a physical exam.  This service lets us provide the care you need with a short phone conversation.  If a prescription is necessary we can send it directly to your pharmacy.  If lab work is needed we can place an order for that and you can then stop by our lab to have the test done at a later time.    If during the course of the call the physician/provider feels a telephone visit is not appropriate, you will not be charged for this service.\"     Keena Tobias complains of   Chief Complaint   Patient presents with     RECHECK     depression and anxiety        I have reviewed and updated the patient's Past Medical History, Social History, Family History and Medication List.    ALLERGIES  Allegra [fexofenadine hydrochloride]    Depression and Anxiety Follow-Up    How are you doing with your depression since your last visit? Improved      How are you doing with your anxiety since your last visit?  Worsened      Are you having other symptoms that might be associated with depression or anxiety? No    Have you had a significant life event? Yes, mom dx with cancer breast already had surgery     Do you have any concerns with your use of alcohol or other drugs? No     She didn't notice improvement with celexa when she was in her 20s.  She is less interested in starting a serotonin medication due to concerns about weight gain and sexual side effects.  If she does not improve with therapy she would consider this medication.    She has hypothyroid and is taking care of by endocrinologist for this.  Her last TSH was low but her endocrinologist kept her at the same dose.  She will follow-up " with them to get this rechecked in the future.            Social History     Tobacco Use     Smoking status: Former Smoker     Smokeless tobacco: Never Used     Tobacco comment: smoked from ages [age]   Substance Use Topics     Alcohol use: No     Drug use: No     PHQ 8/31/2018 6/13/2019 3/26/2020   PHQ-9 Total Score 4 5 12   Q9: Thoughts of better off dead/self-harm past 2 weeks Not at all Not at all Not at all     DAYANARA-7 SCORE 8/31/2018 6/13/2019 3/26/2020   Total Score - - -   Total Score - 16 (severe anxiety) -   Total Score 8 16 14     Last PHQ-9 3/26/2020   1.  Little interest or pleasure in doing things 1   2.  Feeling down, depressed, or hopeless 1   3.  Trouble falling or staying asleep, or sleeping too much 3   4.  Feeling tired or having little energy 3   5.  Poor appetite or overeating 3   6.  Feeling bad about yourself 1   7.  Trouble concentrating 0   8.  Moving slowly or restless 0   Q9: Thoughts of better off dead/self-harm past 2 weeks 0   PHQ-9 Total Score 12   Difficulty at work, home, or with people Somewhat difficult     In the past two weeks have you had thoughts of suicide or self-harm?  No.    Do you have concerns about your personal safety or the safety of others?   No    Suicide Assessment Five-step Evaluation and Treatment (SAFE-T)      How many servings of fruits and vegetables do you eat daily?  4 or more    On average, how many sweetened beverages do you drink each day (Examples: soda, juice, sweet tea, etc.  Do NOT count diet or artificially sweetened beverages)?   0    How many days per week do you exercise enough to make your heart beat faster? no    How many minutes a day do you exercise enough to make your heart beat faster? no  How many days per week do you miss taking your medication? 1    What makes it hard for you to take your medications?  remembering to take        Assessment/Plan:  1. Moderate major depression (H)    - MENTAL HEALTH REFERRAL  - Adult; Outpatient Treatment;  Individual/Couples/Family/Group Therapy/Health Psychology; [facility]: [facility] [phone]; We will contact you to schedule the appointment or please call with any questions    2. DAYANARA (generalized anxiety disorder)    - MENTAL HEALTH REFERRAL  - Adult; Outpatient Treatment; Individual/Couples/Family/Group Therapy/Health Psychology; [facility]: [facility] [phone]; We will contact you to schedule the appointment or please call with any questions  - busPIRone (BUSPAR) 15 MG tablet; Take 1 tablet (15 mg) by mouth 2 times daily  Dispense: 180 tablet; Refill: 1    3. Major depressive disorder, recurrent episode, moderate (H)    - buPROPion (WELLBUTRIN SR) 150 MG 12 hr tablet; TAKE TWO TABLETS BY MOUTH EVERY MORNING AND ONE TABLET EVERY EVENING  Dispense: 270 tablet; Refill: 1    Phone call duration:  12 minutes    Silvana Patton DO

## 2020-03-27 ASSESSMENT — ANXIETY QUESTIONNAIRES: GAD7 TOTAL SCORE: 14

## 2020-04-16 ENCOUNTER — VIRTUAL VISIT (OUTPATIENT)
Dept: PSYCHOLOGY | Facility: CLINIC | Age: 40
End: 2020-04-16
Attending: FAMILY MEDICINE
Payer: COMMERCIAL

## 2020-04-16 DIAGNOSIS — F41.1 GAD (GENERALIZED ANXIETY DISORDER): Primary | ICD-10-CM

## 2020-04-16 PROCEDURE — 99207 ZZC NO BILLABLE SERVICE THIS VISIT: CPT

## 2020-04-16 ASSESSMENT — ANXIETY QUESTIONNAIRES
1. FEELING NERVOUS, ANXIOUS, OR ON EDGE: MORE THAN HALF THE DAYS
GAD7 TOTAL SCORE: 16
2. NOT BEING ABLE TO STOP OR CONTROL WORRYING: MORE THAN HALF THE DAYS
GAD7 TOTAL SCORE: 16
6. BECOMING EASILY ANNOYED OR IRRITABLE: NEARLY EVERY DAY
7. FEELING AFRAID AS IF SOMETHING AWFUL MIGHT HAPPEN: NEARLY EVERY DAY
5. BEING SO RESTLESS THAT IT IS HARD TO SIT STILL: SEVERAL DAYS
4. TROUBLE RELAXING: NEARLY EVERY DAY
7. FEELING AFRAID AS IF SOMETHING AWFUL MIGHT HAPPEN: NEARLY EVERY DAY
GAD7 TOTAL SCORE: 16
3. WORRYING TOO MUCH ABOUT DIFFERENT THINGS: MORE THAN HALF THE DAYS

## 2020-04-16 ASSESSMENT — COLUMBIA-SUICIDE SEVERITY RATING SCALE - C-SSRS
5. HAVE YOU STARTED TO WORK OUT OR WORKED OUT THE DETAILS OF HOW TO KILL YOURSELF? DO YOU INTEND TO CARRY OUT THIS PLAN?: NO
2. HAVE YOU ACTUALLY HAD ANY THOUGHTS OF KILLING YOURSELF?: NO
ATTEMPT PAST THREE MONTHS: NO
1. IN THE PAST MONTH, HAVE YOU WISHED YOU WERE DEAD OR WISHED YOU COULD GO TO SLEEP AND NOT WAKE UP?: YES
ATTEMPT LIFETIME: NO
REASONS FOR IDEATION LIFETIME: MOSTLY TO END OR STOP THE PAIN (YOU COULDN'T GO ON LIVING WITH THE PAIN OR HOW YOU WERE FEELING)
2. HAVE YOU ACTUALLY HAD ANY THOUGHTS OF KILLING YOURSELF LIFETIME?: YES
6. HAVE YOU EVER DONE ANYTHING, STARTED TO DO ANYTHING, OR PREPARED TO DO ANYTHING TO END YOUR LIFE?: NO
6. HAVE YOU EVER DONE ANYTHING, STARTED TO DO ANYTHING, OR PREPARED TO DO ANYTHING TO END YOUR LIFE?: NO
3. HAVE YOU BEEN THINKING ABOUT HOW YOU MIGHT KILL YOURSELF?: YES
REASONS FOR IDEATION PAST MONTH: DOES NOT APPLY
TOTAL  NUMBER OF ABORTED OR SELF INTERRUPTED ATTEMPTS PAST 3 MONTHS: NO
1. IN THE PAST MONTH, HAVE YOU WISHED YOU WERE DEAD OR WISHED YOU COULD GO TO SLEEP AND NOT WAKE UP?: YES
1. IN THE PAST MONTH, HAVE YOU WISHED YOU WERE DEAD OR WISHED YOU COULD GO TO SLEEP AND NOT WAKE UP?: YES
TOTAL  NUMBER OF INTERRUPTED ATTEMPTS LIFETIME: NO
1. IN THE PAST MONTH, HAVE YOU WISHED YOU WERE DEAD OR WISHED YOU COULD GO TO SLEEP AND NOT WAKE UP?: YES
2. HAVE YOU ACTUALLY HAD ANY THOUGHTS OF KILLING YOURSELF?: YES
5. HAVE YOU STARTED TO WORK OUT OR WORKED OUT THE DETAILS OF HOW TO KILL YOURSELF? DO YOU INTEND TO CARRY OUT THIS PLAN?: NO
TOTAL  NUMBER OF ABORTED OR SELF INTERRUPTED ATTEMPTS PAST LIFETIME: NO
2. HAVE YOU ACTUALLY HAD ANY THOUGHTS OF KILLING YOURSELF LIFETIME?: YES
4. HAVE YOU HAD THESE THOUGHTS AND HAD SOME INTENTION OF ACTING ON THEM?: NO
TOTAL  NUMBER OF INTERRUPTED ATTEMPTS PAST 3 MONTHS: NO
4. HAVE YOU HAD THESE THOUGHTS AND HAD SOME INTENTION OF ACTING ON THEM?: NO

## 2020-04-16 ASSESSMENT — PATIENT HEALTH QUESTIONNAIRE - PHQ9
SUM OF ALL RESPONSES TO PHQ QUESTIONS 1-9: 8
10. IF YOU CHECKED OFF ANY PROBLEMS, HOW DIFFICULT HAVE THESE PROBLEMS MADE IT FOR YOU TO DO YOUR WORK, TAKE CARE OF THINGS AT HOME, OR GET ALONG WITH OTHER PEOPLE: SOMEWHAT DIFFICULT
SUM OF ALL RESPONSES TO PHQ QUESTIONS 1-9: 8

## 2020-04-16 NOTE — PROGRESS NOTES
Progress Note - Initial Session    Client Name:  Keena Tobias Date: 4/16/20         Service Type: Individual     Session Start Time: 9am  Session End Time: 9:50am     Session Length: 50 min    Session #: 1     Attendees: Client    Telemedicine Visit: The patient's condition can be safely assessed and treated via synchronous audio and visual telemedicine encounter.      Reason for Telemedicine Visit: Services only offered telehealth    Originating Site (Patient Location): Patient's home    Distant Site (Provider Location): [facility]: Counts include [facility] beds at the [facility]    Consent:  The patient/guardian has verbally consented to: the potential risks and benefits of telemedicine (video visit) versus in person care; bill my insurance or make self-payment for services provided; and responsibility for payment of non-covered services.      Mode of Communication:  Video Conference via [employer]    As the provider I attest to compliance with applicable laws and regulations related to telemedicine.     DATA:  Diagnostic Assessment in progress.  Unable to complete documentation at the conclusion of the first session due to time constraints, did not get through all questions and information.    Interactive Complexity: No  Crisis: No    Intervention:  Interpersonal Therapy: Rapport building in this initial session.    ASSESSMENT:  Mental Status Assessment:  Appearance:   Telephone, unable to assess   Eye Contact:   Telephone, unable to assess   Psychomotor Behavior: Telephone, unable to assess   Attitude:   Cooperative  [place]  Orientation:   All  Speech   Rate / Production: Normal/ Responsive   Volume:  Normal   Mood:    Normal  Affect:    Appropriate   Thought Content:  Clear   Thought Form:  Coherent   Insight:    Good       Safety Issues and Plan for Safety and Risk Management:     [place] Suicide Severity Rating Scale (Lifetime/Recent)  [place] Suicide Severity Rating (Lifetime/Recent)  4/16/2020   1. Wish to be Dead (Lifetime) Yes   Wish to be Dead Description (Lifetime) Wouldn't it be nice not to be here.   1. Wish to be Dead (Recent) Yes   Wish to be Dead Description (Recent) Wouldn't it be nice not to be here.   2. Non-Specific Active Suicidal Thoughts (Lifetime) Yes   Non-Specific Active Suicidal Thought Description (Lifetime) Wouldn't it be nice not to be here.   2. Non-Specific Active Suicidal Thoughts (Recent) Yes   Non-Specific Active Suicidal Thought Description (Recent) Wouldn't it be nice not to be here.   3. Active Suicidal Ideation with any Methods (Not Plan) Without Intent to Act (Lifetime) Yes   Active Suicidal Ideation with any Methods (Not Plan) Description (Lifetime) Driving in to the highway median.   3. Active Sucidal Ideation with any Methods (Not Plan) Without Intent to Act (Recent) Yes   Active Suicidal Ideation with any Methods (Not Plan) Description (Recent) No specific ideas or plan   4. Active Suicidal Ideation with Some Intent to Act, Without Specific Plan (Lifetime) No   4. Active Suicidal Ideation with Some Intent to Act, Without Specific Plan (Recent) No   5. Active Suicidal Ideation with Specific Plan and Intent (Lifetime) No   5. Active Suicidal Ideation with Specific Plan and Intent (Recent) No   Most Severe Ideation Rating (Lifetime) 2   Most Severe Ideation Description (Lifetime) Driving in to the highway median.   Frequency (Lifetime) NA   Duration (Lifetime) NA   Controllability (Lifetime) NA   Protective Factors  (Lifetime) 2   Reasons for Ideation (Lifetime) 4   Most Severe Ideation Rating (Past Month) 2   Most Severe Ideation Description (Past Month) Wouldn't it be nice not to be here.   Frequency (Past Month) 1   Duration (Past Month) 1   Controllability (Past Month) 2   Protective Factors (Past Month) 1   Reasons for Ideation (Past Month) 0   Actual Attempt (Lifetime) No   Actual Attempt (Past 3 Months) No   Has subject engaged in non-suicidal  self-injurious behavior? (Lifetime) No   Has subject engaged in non-suicidal self-injurious behavior? (Past 3 Months) No   Interrupted Attempts (Lifetime) No   Interrupted Attempts (Past 3 Months) No   Aborted or Self-Interrupted Attempt (Lifetime) No   Aborted or Self-Interrupted Attempt (Past 3 Months) No   Preparatory Acts or Behavior (Lifetime) No   Preparatory Acts or Behavior (Past 3 Months) No   Most Recent Attempt Actual Lethality Code NA   Most Lethal Attempt Actual Lethality Code NA   Initial/First Attempt Actual Lethality Code NA     Patient denies current fears or concerns for personal safety.  Patient reports the following current or recent suicidal ideation or behaviors: fleeting thoughts documented in the [place] Suicide Severity.  Pt states no active thoughts of suicide at this time..  Patient denies current or recent homicidal ideation or behaviors.  Patient denies current or recent self injurious behavior or ideation.  Patient denies other safety concerns.  Recommended that patient call 911 or go to the local ED should there be a change in any of these risk factors.  Patient reports there are Did not ask in this session.     Diagnostic Criteria:  Diagnosis in progress - symptoms appear consistent with an anxiety disorder.      DSM5 Diagnoses: (Sustained by DSM5 Criteria Listed Above)  Diagnoses: 300.00 (F41.9) Unspecified Anxiety Disorder - provisional diagnosis  Psychosocial & Contextual Factors: Keena is in recovery from a binge eating disorder and active in OA HOW since May 2019.  She states she has experienced relief from compulsive eating and now anxiety symptoms are increasing.  She has experienced paralyzing fear at times and is seeking relief from these experiences.  WHODAS 2.0 (12 item):   WHODAS 2.0 Total Score 4/16/2020   Total Score 24   Total Score MyChart 24       Collateral Reports Completed:  Did not ask at this time.      PLAN: (Homework, other):  - Breathing exercises to use  when experiencing anxious thoughts or physical symptoms of anxiety.     - Emotion awareness and identification.  Add emotion information to daily reflection for OA.  As a reference for terms to increase understanding and terms see weblink below.  This worksheet lists emotions, types/range of these emotions as well as signs & behaviors these emotions are present.  https://www.[employer]worksheets/emotions-language-signs-behaviors.pdf  - Upcoming appointments 4/24 at 8am, 5/1 at 2:30pm, 5/8 at 2:30pm    JOI Marino, LGSW  Reviewed and signed by JOI Garcia, LICSW 4/17/20

## 2020-04-16 NOTE — LETTER
April 16, 2020    Sugey Brink,  It was nice to talk with you this morning.  I have listed the homework we discussed today.    PLAN: (Homework, other):  - Breathing exercises to use when experiencing anxious thoughts or physical symptoms of anxiety.     - Emotion awareness and identification.  Add emotion information to daily reflection for OA.  As a reference for terms to increase understanding and terms see weblink below.  This worksheet lists emotions, types/range of these emotions as well as signs & behaviors these emotions are present.  https://www.[employer].com/worksheets/emotions-language-signs-behaviors.pdf  - Upcoming appointments 4/24 at 8am, 5/1 at 2:30pm, 5/8 at 2:30pm    Take good care,  Jessica

## 2020-04-16 NOTE — PATIENT INSTRUCTIONS
PLAN: (Homework, other):  - Breathing exercises to use when experiencing anxious thoughts or physical symptoms of anxiety.     - Emotion awareness and identification.  Add emotion information to daily reflection for OA.  As a reference for terms to increase understanding and terms see weblink below.  This worksheet lists emotions, types/range of these emotions as well as signs & behaviors these emotions are present.  [email or web address].com/worksheets/emotions-language-signs-behaviors.pdf  - Upcoming appointments 4/24 at 8am, 5/1 at 2:30pm, 5/8 at 2:30pm

## 2020-04-17 ASSESSMENT — PATIENT HEALTH QUESTIONNAIRE - PHQ9: SUM OF ALL RESPONSES TO PHQ QUESTIONS 1-9: 8

## 2020-04-17 ASSESSMENT — ANXIETY QUESTIONNAIRES: GAD7 TOTAL SCORE: 16
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
## 2020-04-24 ENCOUNTER — VIRTUAL VISIT (OUTPATIENT)
Dept: PSYCHOLOGY | Facility: CLINIC | Age: 40
End: 2020-04-24
Payer: COMMERCIAL

## 2020-04-24 DIAGNOSIS — F41.1 GAD (GENERALIZED ANXIETY DISORDER): Primary | ICD-10-CM

## 2020-04-24 PROCEDURE — 90791 PSYCH DIAGNOSTIC EVALUATION: CPT | Mod: 95

## 2020-05-01 ENCOUNTER — TELEPHONE (OUTPATIENT)
Dept: PSYCHOLOGY | Facility: CLINIC | Age: 40
End: 2020-05-01

## 2020-05-01 NOTE — TELEPHONE ENCOUNTER
Writer called patient twice and left messages regarding our 2:30 appointment today.  I did not get a call back.  I reminder her we have another appointment next Friday, 5/8 at 2:30 and I look forward to talking with her then.    Jessica Lyons, MSW, LGSW

## 2020-05-04 ENCOUNTER — TELEPHONE (OUTPATIENT)
Dept: FAMILY MEDICINE | Facility: CLINIC | Age: 40
End: 2020-05-04

## 2020-05-04 NOTE — TELEPHONE ENCOUNTER
Central Prior Authorization Team  Phone: [phone]    PA Initiation    Medication: buPROPion (WELLBUTRIN SR) 150 MG 12 hr tablet   Insurance Company: [employer] - Phone [phone] Fax [phone]  Pharmacy Filling the Rx: [facility] - [place], [place] - [address]  Filling Pharmacy Phone: [phone]  Filling Pharmacy Fax:    Start Date: 5/4/2020  Manually faxed pa to insurance. Fax #[phone]

## 2020-05-05 ENCOUNTER — OFFICE VISIT (OUTPATIENT)
Dept: NUTRITION | Facility: CLINIC | Age: 40
End: 2020-05-05
Payer: COMMERCIAL

## 2020-05-05 DIAGNOSIS — F50.89 COMPULSIVE OVEREATING: Primary | ICD-10-CM

## 2020-05-05 PROCEDURE — 98967 PH1 ASSMT&MGMT NQHP 11-20: CPT

## 2020-05-05 NOTE — PATIENT INSTRUCTIONS
1. Reduce fat to 1.5 servings at dinner and/or starch to 1.5 servings at dinner.    2. May want to wait on reducing calories if you are starting to get more active with outdoor activities and gardening.  It is good to not only have 150+ activity a week but also including strength training/resistance training to help build muscle mass for weight loss/higher metabolism.     Moni Stephenson RDN, LD, [facility]   [phone]

## 2020-05-05 NOTE — PROGRESS NOTES
Medical Nutrition Therapy  Visit Type:Reassessment and intervention  Patient verbally consented to the telephone visit service today: yes     Keena Tobias presents today for MNT and education related to weight management and compulsive overeating.   She is accompanied by self on phone.    ASSESSMENT:   Patient comments/concerns relating to nutrition: Wants to know if able to cut out a grain since lost 25 lbs but not has reached a plateau.  She is still following these guidelines by Overeat[employer] Anonymous:    Breakfast  1 protein  One starch  One fruit  One fat  1 dairy     Mid-morning snack  One veg  One protein  1 fat     Lunch  3 protein  One starch  Two veg  One fat  One fruit     Dinner  Four protein  Two starch  Two veg  Two fat     Bedtime Snack  1 dairy  1 fruit    Usual Protein: egg, chicken, veggie toi.  Admits was a little nervous to reach out since not want her food restricted - has been happy with current meal plan.     NUTRITION HISTORY:    Breakfast: 1 banana, 1oz oat, 1/2 oz cream cheese and 16 gm PB with 4oz milk/4oz yogurt OR pear, 1oz oat, egg, 1 oz cheese  AM: 1 cup veggies (red peppers and 2 oz hummus) OR 1 cup carrots and 32 gm PB  Lunch: 3 oz steak, 1 bun, cooked veggies, 1 oz cheese, apple OR 3 oz ham and cheese, potato, veggie, apple, sour cream OR 4 cup salad, 1 T dressing, orange and 3 oz HC OR veggie toi (1 protein/1 starch), 2 oz cheese, 4 cup salad and apple  PM: none  Dinner: 4oz steak, 8 oz potato, veggies and 1 Tbsp sour cream OR 1 oz tortilla chips, cheese, 3 oz hamburger OR stir garcia (4 oz chicken, 6 oz noodle, 6 oz veggies and 1 Tbsp oil), 1/2 nuts  Snacks: cherries and banana (1 cup) OR banana and cheese OR 8oz yogurt and 1 cup mixed fruit  Beverages: Milk 0-4 oz/day and Decaf tea and Water all day    Misses meals? none  Eats out:  seldom     Previous diet education:  Yes     Food allergies/intolerances: none    Diet is high in: sodium at some sittings  Diet is low  "in: calcium some days    EXERCISE: Walks most morning and gardening. Feels active right now.  More sedentary over the winter.     SOCIO/ECONOMIC:   Lives with: spouse and children    MEDICATIONS:  Current Outpatient Medications   Medication     buPROPion (WELLBUTRIN SR) 150 MG 12 hr tablet     busPIRone (BUSPAR) 15 MG tablet     Cholecalciferol (VITAMIN D3 PO)     clindamycin (CLINDAGEL) 1 % external gel     levothyroxine (SYNTHROID/LEVOTHROID) 200 MCG tablet     metroNIDAZOLE (METROCREAM) 0.75 % external cream     order for DME     spironolactone (ALDACTONE) 50 MG tablet     No current facility-administered medications for this visit.        LABS:  Last Basic Metabolic Panel:  Lab Results   Component Value Date     10/03/2019      Lab Results   Component Value Date    POTASSIUM 4.8 10/03/2019     Lab Results   Component Value Date    CHLORIDE 105 10/03/2019     Lab Results   Component Value Date    AURY 8.8 10/03/2019     Lab Results   Component Value Date    CO2 25 10/03/2019     Lab Results   Component Value Date    BUN 16 10/03/2019     Lab Results   Component Value Date    CR 0.72 10/03/2019     Lab Results   Component Value Date    GLC 85 10/03/2019       ANTHROPOMETRICS:  Vitals: There were no vitals taken for this visit.  Estimated body mass index is 34.98 kg/m  as calculated from the following:    Height as of 10/3/19: 1.708 m (5' 7.25\").    Weight as of 10/3/19: 102.1 kg (225 lb).      Wt Readings from Last 5 Encounters:   10/03/19 102.1 kg (225 lb)   08/31/18 105.9 kg (233 lb 6 oz)   10/05/17 93.9 kg (207 lb)   07/25/17 98.1 kg (216 lb 3.2 oz)   09/02/16 97.5 kg (215 lb)       Weight Change: Per patient, lost 25 lbs.  Per last weight (Oct)- was down 8.4 lbs in the past 14 months.  Patient not weight herself often due to her eating disorder.    ESTIMATED KCAL REQUIREMENTS:  2078 kcal per day based on last chart weight of 225 lbs.    NUTRITION DIAGNOSIS: Overweight/Obesity related to prior excessive " energy intake as evidenced by BMI >30    NUTRITION INTERVENTION:  Nutrition Prescription: Continue to follow OA How Meal Plan  Education given to support: general nutrition guidelines, weight reduction, consistent meals, fat modification, exercise, fiber, portion control and heart healthy diet  Motivational Interviewing    Discussion: the OA How Meal Plan continues to work well for Keena.  No recent chart weight but she indicates she is down 25 lbs. Wants to know of small changes she can make to reduce calories and continue weight loss.    Discussed ideas for changes based on current nutrient intake and food choices.  Ideal for her to consume 3 servings of dairy a day and past few days she has been eating cheese.  Cheese and sometimes meat, can be a higher fat protein source so would be able to reduce fat servings (currently at 5 servings a day). Also adequate intake for fruit is 2 servings a day since she gets 2-3 cups veggies or more a day; she is getting 3 servings of fruit currently.  Want to get at least 3-4 grain servings a day for the B-vitamins, iron and fiber so could try 1.5 servings at dinner. Do not recommend less protein, veggies or dairy servings at this time.     Discussed the above ideas and Keena feels she would be too hungry if cut back on breakfast, morning snack or lunch but feels eating more than needed at dinner. She is willing to try 1.5 servings starch at dinner and reduce fat to 1.5 servings to see how this works for her.     Lastly, asked about current activity since higher calorie expenditure may also be an option to help with overcoming weight loss plateau and Keena indicates higher activity more recently with nicer weather.  She is walking in the morning and starting to do more gardening, which keeps her busy in summer with large gardens.  Suggested she may want to wait on food changes until sees how more activity helps.  She was informed some kind of weight/resistance  training along with the aerobic activity (walking) is often helpful for weight loss.  Pt verbalized understanding of concepts discussed and recommendations provided.         PATIENT'S BEHAVIOR CHANGE GOALS:   See Patient Instructions for patient stated behavior change goals. AVS was sent via [employer].    MONITOR / EVALUATE:  RD will monitor/evaluate:  Food / Beverage / Nutrient intake   Weight change    FOLLOW-UP:  Follow up with RD as needed.  Call RD with questions/concerns.  Patient declines additional follow up at this time but says will call if struggling.    Moni Stephenson RDN, LD, CDCES   Time spent in minutes: 16 minutes  Encounter: Individual TELEPHONE visit

## 2020-05-05 NOTE — TELEPHONE ENCOUNTER
Prior Authorization Approval    Authorization Effective Date: 5/5/2020  Authorization Expiration Date: 5/5/2021  Medication: buPROPion (WELLBUTRIN SR) 150 MG 12 hr tablet- APPROVED   Approved Dose/Quantity:   Reference #:     Insurance Company: [employer] - Phone [phone] Fax [phone]  Expected CoPay:       CoPay Card Available:      Foundation Assistance Needed:    Which Pharmacy is filling the prescription (Not needed for infusion/clinic administered): [facility] - [place] [address]  Pharmacy Notified: Yes  Patient Notified: Comment:  **Instructed pharmacy to notify patient when script is ready to /ship.**

## 2020-05-08 ENCOUNTER — TELEPHONE (OUTPATIENT)
Dept: PSYCHOLOGY | Facility: CLINIC | Age: 40
End: 2020-05-08

## 2020-05-11 ENCOUNTER — TELEPHONE (OUTPATIENT)
Dept: FAMILY MEDICINE | Facility: CLINIC | Age: 40
End: 2020-05-11

## 2020-05-11 DIAGNOSIS — F50.89 COMPULSIVE OVEREATING: Primary | ICD-10-CM

## 2020-05-11 NOTE — TELEPHONE ENCOUNTER
----- Message from Moni Stephenson RD sent at 5/4/2020  6:44 PM CDT -----  Regarding: Nutrition referral  Hi Dr. Patton,  Can you please order another Nutrition referral for Keena.  She'd like to connect again and last referral is now more than 1 year ago.  Thanks so much!  Moni Stephenson RDN, LD, [facility]

## 2020-05-14 ENCOUNTER — FCC EXTENDED DOCUMENTATION (OUTPATIENT)
Dept: PSYCHOLOGY | Facility: CLINIC | Age: 40
End: 2020-05-14
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
## 2020-05-14 NOTE — TELEPHONE ENCOUNTER
"Jessica Lyons contacted Keena at 2:30 for her appointment.  Keena stated she \"couldn't do therapy at this time\".  I let her know she can contact the central scheduling team if she wanted to reschedule with me or another provider in the future.    Jessica Lyons, MSW, LGSW      "

## 2020-07-30 ENCOUNTER — NURSE TRIAGE (OUTPATIENT)
Dept: NURSING | Facility: CLINIC | Age: 40
End: 2020-07-30

## 2020-07-30 ENCOUNTER — VIRTUAL VISIT (OUTPATIENT)
Dept: FAMILY MEDICINE | Facility: OTHER | Age: 40
End: 2020-07-30
Payer: COMMERCIAL

## 2020-07-30 PROCEDURE — 99421 OL DIG E/M SVC 5-10 MIN: CPT | Performed by: PHYSICIAN ASSISTANT

## 2020-07-30 NOTE — PROGRESS NOTES
"Date: 2020 10:10:09  Clinician: Alphonse Stevens  Clinician NPI: [id number]  Patient: Keena Sanders  Patient : 1980  Patient Address[place] [facility] [place] [id number]  Patient Phone: [phone]  Visit Protocol: URI  Patient Summary:  Keena is a 40 year old ( : 1980 ) female who initiated a Visit for COVID-19 (Coronavirus) evaluation and screening. When asked the question \"Please sign me up to receive news, health information and promotions from [employer].\", Keena responded \"No\".    Keena states her symptoms started 1-2 days ago.   Her symptoms consist of diarrhea, myalgia, a sore throat, a cough, malaise, and a headache. She is experiencing mild difficulty breathing with activities but can speak normally in full sentences. Keena also feels feverish but was unable to measure her temperature.   Symptom details     Cough: Keena coughs a few times an hour and her cough is not more bothersome at night. Phlegm does not come into her throat when she coughs. She does not believe her cough is caused by post-nasal drip.     Sore throat: Keena reports having mild throat pain (1-3 on a 10 point pain scale), does not have exudate on her tonsils, and can swallow liquids. She is not sure if the lymph nodes in her neck are enlarged. A rash has not appeared on the skin since the sore throat started.     Headache: She states the headache is mild (1-3 on a 10 point pain scale).      Keena denies having wheezing, nausea, teeth pain, ageusia, anosmia, facial pain or pressure, nasal congestion, vomiting, rhinitis, ear pain, and chills. She also denies having recent facial or sinus surgery in the past 60 days, taking antibiotic medication in the past month, and having a sinus infection within the past year.   Precipitating events  Within the past week, Keena has not been exposed to someone with strep throat. She has not recently been exposed to someone with influenza. " Keena has been in close contact with the following high risk individuals: people with asthma, heart disease or diabetes.   Pertinent COVID-19 (Coronavirus) information  In the past 14 days, Keena has not worked in a congregate living setting.   She does not work or volunteer as healthcare worker or a  and does not work or volunteer in a healthcare facility.   Keena also has not lived in a congregate living setting in the past 14 days. She lives with a healthcare worker.   Keena has not had a close contact with a laboratory-confirmed COVID-19 patient within 14 days of symptom onset.   Pertinent medical history  Keena does not get yeast infections when she takes antibiotics.   Keena does not need a return to work/school note.   Weight: 215 lbs   Keena does not smoke or use smokeless tobacco.   She denies pregnancy and denies breastfeeding. She has menstruated in the past month.   Additional information as reported by the patient (free text): My  is a nurse at the .  He has the entire list of COVID symptoms.  He's getting swabbed today at 3.30 in [place] for a COVID test.  I'd really like to get tested at the same time.  I woke up this morning coughing.  I'm fatigued and achy.  We have three small kids.  With their dad being so sick, I don't want to leave them home lone (effectively speaking) multiple times.   Weight: 215 lbs    MEDICATIONS: buspirone oral, Synthroid oral, Wellbutrin SR oral, ALLERGIES: Allegra  Clinician Response:  Dear Keena,   Your symptoms show that you may have coronavirus (COVID-19). This illness can cause fever, cough and trouble breathing. Many people get a mild case and get better on their own. Some people can get very sick.  What should I do?  We would like to test you for this virus.   1. Please call [phone] to schedule your visit. Explain that you were referred by [facility] to have a COVID-19 test. Be ready to share your [facility] visit  "ID number.  The following will serve as your written order for this COVID Test, ordered by me, for the indication of suspected COVID [Z20.828]: The test will be ordered in [employer], our electronic health record, after you are scheduled. It will show as ordered and authorized by Manuel Red MD.  Order: COVID-19 (Coronavirus) PCR [facility]IC [facility].      2. When it's time for your COVID test:  Stay at least 6 feet away from others. (If someone will drive you to your test, stay in the backseat, as far away from the  as you can.)   Cover your mouth and nose with a mask, tissue or washcloth.  Go straight to the testing site. Don't make any stops on the way there or back.      3.Starting now: Stay home and away from others (self-isolate) until:   You've had no fever---and no medicine that reduces fever---for 3 full days (72 hours). And...   Your other symptoms have gotten better. For example, your cough or breathing has improved. And...   At least 10 days have passed since your symptoms started.       During this time, don't leave the house except for testing or medical care.   Stay in your own room, even for meals. Use your own bathroom if you can.   Stay away from others in your home. No hugging, kissing or shaking hands. No visitors.  Don't go to work, school or anywhere else.    Clean \"high touch\" surfaces often (doorknobs, counters, handles, etc.). Use a household cleaning spray or wipes. You'll find a full list of  on the EPA website: www.epa.gov/pesticide-registration/list-n-disinfectants-use-against-sars-cov-2.   Cover your mouth and nose with a mask, tissue or washcloth to avoid spreading germs.  Wash your hands and face often. Use soap and water.  Caregivers in these groups are at risk for severe illness due to COVID-19:  o People 65 years and older  o People who live in a nursing home or long-term care facility  o People with chronic disease (lung, heart, cancer, diabetes, kidney, " liver, immunologic)  o People who have a weakened immune system, including those who:   Are in cancer treatment  Take medicine that weakens the immune system, such as corticosteroids  Had a bone marrow or organ transplant  Have an immune deficiency  Have poorly controlled HIV or AIDS  Are obese (body mass index of 40 or higher)  Smoke regularly   o Caregivers should wear gloves while washing dishes, handling laundry and cleaning bedrooms and bathrooms.  o Use caution when washing and drying laundry: Don't shake dirty laundry, and use the warmest water setting that you can.  o For more tips, go to www.cdc.gov/coronavirus/2019-ncov/downloads/10Things.pdf.    4.Sign up for [employer]. We know it's scary to hear that you might have COVID-19. We want to track your symptoms to make sure you're okay over the next 2 weeks. Please look for an email from [employer]---this is a free, online program that we'll use to keep in touch. To sign up, follow the link in the email. Learn more at [email or web address]  How can I take care of myself?   Get lots of rest. Drink extra fluids (unless a doctor has told you not to).   Take Tylenol (acetaminophen) for fever or pain. If you have liver or kidney problems, ask your family doctor if it's okay to take Tylenol.   Adults can take either:    650 mg (two 325 mg pills) every 4 to 6 hours, or...   1,000 mg (two 500 mg pills) every 8 hours as needed.    Note: Don't take more than 3,000 mg in one day. Acetaminophen is found in many medicines (both prescribed and over-the-counter medicines). Read all labels to be sure you don't take too much.   For children, check the Tylenol bottle for the right dose. The dose is based on the child's age or weight.    If you have other health problems (like cancer, heart failure, an organ transplant or severe kidney disease): Call your specialty clinic if you don't feel better in the next 2 days.       Know when to call 911. Emergency warning  signs include:    Trouble breathing or shortness of breath Pain or pressure in the chest that doesn't go away Feeling confused like you haven't felt before, or not being able to wake up Bluish-colored lips or face.  Where can I get more information?   [facility] [place] -- About COVID-19: [email or web address]   CDC -- What to Do If You're Sick: www.cdc.gov/coronavirus/2019-ncov/about/steps-when-sick.html   CDC -- Ending Home Isolation: www.cdc.gov/coronavirus/2019-ncov/hcp/disposition-in-home-patients.html   [facility] -- Caring for Someone: www.cdc.gov/coronavirus/2019-ncov/if-you-are-sick/care-for-someone.html   [facility] -- Interim Guidance for Hospital Discharge to Home: www.health.[facility].mn./diseases/coronavirus/hcp/hospdischarge.pdf   [facility] clinical trials (COVID-19 research studies): clinicalaffairs.[facility]-clinical-trials    Below are the COVID-19 hotlines at the [place] Department of Health ([facility]). Interpreters are available.    For health questions: Call 426-883-4869 or 1-801.105.9352 (7 a.m. to 7 p.m.) For questions about schools and childcare: Call 941-904-0758 or 1-397.923.4488 (7 a.m. to 7 p.m.)    Diagnosis: Acute upper respiratory infection, unspecified  Diagnosis ICD: J06.9  Additional Clinician Notes: if your symptoms are not improving or worsen, please go to one of our urgent care locations for evaluation.

## 2020-07-30 NOTE — TELEPHONE ENCOUNTER
"Patient is calling with symptoms of possible covid. Her  had been exposed and developed symptoms and he is being tested today. Patient woke up this morning with symptoms of cough, sore throat and headache. She would also like to be tested for covid.  Patient given information for [facility]. She went to the web site while on the phone and will start a visit.     Answer Assessment - Initial Assessment Questions  1. CLOSE CONTACT: \"Who is the person with the confirmed or suspected COVID-19 infection that you were exposed to?\"      Suspected - being tested today  2. PLACE of CONTACT: \"Where were you when you were exposed to COVID-19?\" (e.g., home, school, medical waiting room; which city?)       Home   3. TYPE of CONTACT: \"How much contact was there?\" (e.g., sitting next to, live in same house, work in same office, same building)     Live in same house  4. DURATION of CONTACT: \"How long were you in contact with the COVID-19 patient?\" (e.g., a few seconds, passed by person, a few minutes, live with the patient)      Lives with the patient  5. DATE of CONTACT: \"When did you have contact with a COVID-19 patient?\" (e.g., how many days ago)       daily  6. TRAVEL: \"Have you traveled out of the country recently?\" If so, \"When and where?\"      * Also ask about out-of-state travel, since the CDC has identified some high-risk cities for community spread in the .      * Note: Travel becomes less relevant if there is widespread community transmission where the patient lives.      no  7. COMMUNITY SPREAD: \"Are there lots of cases of COVID-19 (community spread) where you live?\" (See public health department website, if unsure)        yes  8. SYMPTOMS: \"Do you have any symptoms?\" (e.g., fever, cough, breathing difficulty)      Cough, sore throat, headache  9. PREGNANCY OR POSTPARTUM: \"Is there any chance you are pregnant?\" \"When was your last menstrual period?\" \"Did you deliver in the last 2 weeks?\"      no  10. HIGH RISK: \"Do " "you have any heart or lung problems? Do you have a weak immune system?\" (e.g., CHF, COPD, asthma, HIV positive, chemotherapy, renal failure, diabetes mellitus, sickle cell anemia)        no    [facility] Specific Disposition  - [facility] Specific Patient Instructions  COVID 19 Nurse Triage Plan/Patient Instructions    Please be aware that novel coronavirus (COVID-19) may be circulating in the community. If you develop symptoms such as fever, cough, or SOB or if you have concerns about the presence of another infection including coronavirus (COVID-19), please contact your health care provider or visit [email or web address].       Virtual Visit with provider recommended. Reference Visit Selection Guide.    Thank you for taking steps to prevent the spread of this virus.  Limit your contact with others.  Wear a simple mask to cover your cough.  Wash your hands well and often.    Resources  [facility]: About COVID-19: [email or web address]  CDC: What to Do If You're Sick: www.cdc.gov/coronavirus/2019-ncov/about/steps-when-sick.html  CDC: Ending Home Isolation: www.cdc.gov/coronavirus/2019-ncov/hcp/disposition-in-home-patients.html   CDC: Caring for Someone: www.cdc.gov/coronavirus/2019-ncov/if-you-are-sick/care-for-someone.html   [facility]: Interim Guidance for Hospital Discharge to Home: www.health.[facility].mn.us/diseases/coronavirus/hcp/hospdischarge.pdf  [facility] clinical trials (COVID-19 research studies): clinicalaffairs.[facility]-clinical-trials   Below are the COVID-19 hotlines at the [facility] ([facility]). Interpreters are available.   For health questions: Call 141-469-8305 or 1-887.885.8299 (7 a.m. to 7 p.m.)  For questions about schools and childcare: Call 192-368-1914 or 1-245.719.7191 (7 a.m. to 7 p.m.)    Protocols used: CORONAVIRUS (COVID-19) EXPOSURE-A- 5.16.20    Elissa Warren RN on 7/30/2020 at 9:51 AM    "

## 2020-07-31 DIAGNOSIS — Z20.822 SUSPECTED COVID-19 VIRUS INFECTION: Primary | ICD-10-CM

## 2020-07-31 PROCEDURE — U0003 INFECTIOUS AGENT DETECTION BY NUCLEIC ACID (DNA OR RNA); SEVERE ACUTE RESPIRATORY SYNDROME CORONAVIRUS 2 (SARS-COV-2) (CORONAVIRUS DISEASE [COVID-19]), AMPLIFIED PROBE TECHNIQUE, MAKING USE OF HIGH THROUGHPUT TECHNOLOGIES AS DESCRIBED BY CMS-2020-01-R: HCPCS | Performed by: FAMILY MEDICINE

## 2020-08-01 LAB
SARS-COV-2 RNA SPEC QL NAA+PROBE: NOT DETECTED
SPECIMEN SOURCE: NORMAL

## 2020-10-14 DIAGNOSIS — F33.1 MAJOR DEPRESSIVE DISORDER, RECURRENT EPISODE, MODERATE (H): ICD-10-CM

## 2020-10-14 DIAGNOSIS — F41.1 GAD (GENERALIZED ANXIETY DISORDER): ICD-10-CM

## 2020-10-16 RX ORDER — BUPROPION HYDROCHLORIDE 150 MG/1
TABLET, EXTENDED RELEASE ORAL
Qty: 90 TABLET | Refills: 0 | Status: SHIPPED | OUTPATIENT
Start: 2020-10-16 | End: 2020-11-03

## 2020-10-16 RX ORDER — BUSPIRONE HYDROCHLORIDE 15 MG/1
TABLET ORAL
Qty: 60 TABLET | Refills: 0 | Status: SHIPPED | OUTPATIENT
Start: 2020-10-16 | End: 2020-11-03

## 2020-10-20 ENCOUNTER — TELEPHONE (OUTPATIENT)
Dept: FAMILY MEDICINE | Facility: CLINIC | Age: 40
End: 2020-10-20

## 2020-10-21 NOTE — TELEPHONE ENCOUNTER
Central Prior Authorization Team   Phone: [phone]    PA Initiation    Medication: BUPROPION HCL ER (SR) 150MG TB12  Insurance Company: [employer] - Phone [phone] Fax [phone]  Pharmacy Filling the Rx: [facility] - [place], [place] - [address]  Filling Pharmacy Phone: [phone]  Filling Pharmacy Fax: [phone]  Start Date: 10/21/2020

## 2020-10-21 NOTE — TELEPHONE ENCOUNTER
Prior Authorization Approval    Authorization Effective Date: 10/21/2020  Authorization Expiration Date: 10/21/2021  Medication: BUPROPION HCL ER (SR) 150MG TB12-APPROVED  Approved Dose/Quantity:    Reference #:     Insurance Company: [employer] - Phone [phone] Fax [phone]  Expected CoPay:       CoPay Card Available:      Foundation Assistance Needed:    Which Pharmacy is filling the prescription (Not needed for infusion/clinic administered): [facility] - [facility] [address]  Pharmacy Notified: Yes  Patient Notified: Yes  **Instructed pharmacy to notify patient when script is ready to /ship.**

## 2020-11-03 ENCOUNTER — E-VISIT (OUTPATIENT)
Dept: FAMILY MEDICINE | Facility: CLINIC | Age: 40
End: 2020-11-03
Payer: COMMERCIAL

## 2020-11-03 DIAGNOSIS — F41.1 GAD (GENERALIZED ANXIETY DISORDER): ICD-10-CM

## 2020-11-03 DIAGNOSIS — F33.1 MAJOR DEPRESSIVE DISORDER, RECURRENT EPISODE, MODERATE (H): ICD-10-CM

## 2020-11-03 PROCEDURE — 99421 OL DIG E/M SVC 5-10 MIN: CPT | Performed by: FAMILY MEDICINE

## 2020-11-03 RX ORDER — BUSPIRONE HYDROCHLORIDE 15 MG/1
15 TABLET ORAL 2 TIMES DAILY
Qty: 180 TABLET | Refills: 1 | Status: SHIPPED | OUTPATIENT
Start: 2020-11-03 | End: 2021-04-15

## 2020-11-03 RX ORDER — BUPROPION HYDROCHLORIDE 150 MG/1
TABLET, EXTENDED RELEASE ORAL
Qty: 270 TABLET | Refills: 1 | Status: SHIPPED | OUTPATIENT
Start: 2020-11-03 | End: 2021-04-15

## 2020-11-03 ASSESSMENT — ANXIETY QUESTIONNAIRES
7. FEELING AFRAID AS IF SOMETHING AWFUL MIGHT HAPPEN: SEVERAL DAYS
4. TROUBLE RELAXING: MORE THAN HALF THE DAYS
GAD7 TOTAL SCORE: 13
GAD7 TOTAL SCORE: 13
2. NOT BEING ABLE TO STOP OR CONTROL WORRYING: MORE THAN HALF THE DAYS
6. BECOMING EASILY ANNOYED OR IRRITABLE: NEARLY EVERY DAY
GAD7 TOTAL SCORE: 13
7. FEELING AFRAID AS IF SOMETHING AWFUL MIGHT HAPPEN: SEVERAL DAYS
3. WORRYING TOO MUCH ABOUT DIFFERENT THINGS: MORE THAN HALF THE DAYS
5. BEING SO RESTLESS THAT IT IS HARD TO SIT STILL: SEVERAL DAYS
1. FEELING NERVOUS, ANXIOUS, OR ON EDGE: MORE THAN HALF THE DAYS

## 2020-11-03 ASSESSMENT — PATIENT HEALTH QUESTIONNAIRE - PHQ9
10. IF YOU CHECKED OFF ANY PROBLEMS, HOW DIFFICULT HAVE THESE PROBLEMS MADE IT FOR YOU TO DO YOUR WORK, TAKE CARE OF THINGS AT HOME, OR GET ALONG WITH OTHER PEOPLE: SOMEWHAT DIFFICULT
SUM OF ALL RESPONSES TO PHQ QUESTIONS 1-9: 4
SUM OF ALL RESPONSES TO PHQ QUESTIONS 1-9: 4

## 2020-11-04 ASSESSMENT — PATIENT HEALTH QUESTIONNAIRE - PHQ9: SUM OF ALL RESPONSES TO PHQ QUESTIONS 1-9: 4

## 2020-11-04 ASSESSMENT — ANXIETY QUESTIONNAIRES: GAD7 TOTAL SCORE: 13

## 2020-11-24 ENCOUNTER — AMBULATORY - HEALTHEAST (OUTPATIENT)
Dept: LAB | Facility: HOSPITAL | Age: 40
End: 2020-11-24

## 2020-11-24 DIAGNOSIS — E89.0 POSTSURGICAL HYPOTHYROIDISM: ICD-10-CM

## 2020-11-24 DIAGNOSIS — E05.00 BASEDOW'S DISEASE: ICD-10-CM

## 2020-11-24 LAB
T4 FREE SERPL-MCNC: 1.4 NG/DL (ref 0.7–1.8)
TSH SERPL DL<=0.005 MIU/L-ACNC: 0.02 UIU/ML (ref 0.3–5)

## 2020-11-30 ENCOUNTER — IMMUNIZATION (OUTPATIENT)
Dept: NURSING | Facility: CLINIC | Age: 40
End: 2020-11-30
Payer: COMMERCIAL

## 2020-11-30 PROCEDURE — 90471 IMMUNIZATION ADMIN: CPT

## 2020-11-30 PROCEDURE — 90686 IIV4 VACC NO PRSV 0.5 ML IM: CPT

## 2020-12-06 ENCOUNTER — HEALTH MAINTENANCE LETTER (OUTPATIENT)
Age: 40
End: 2020-12-06

## 2021-01-19 ENCOUNTER — VIRTUAL VISIT (OUTPATIENT)
Dept: FAMILY MEDICINE | Facility: CLINIC | Age: 41
End: 2021-01-19
Payer: COMMERCIAL

## 2021-01-19 DIAGNOSIS — F41.1 GAD (GENERALIZED ANXIETY DISORDER): Primary | ICD-10-CM

## 2021-01-19 PROCEDURE — 99213 OFFICE O/P EST LOW 20 MIN: CPT | Mod: 95 | Performed by: FAMILY MEDICINE

## 2021-01-19 ASSESSMENT — ANXIETY QUESTIONNAIRES
2. NOT BEING ABLE TO STOP OR CONTROL WORRYING: NEARLY EVERY DAY
5. BEING SO RESTLESS THAT IT IS HARD TO SIT STILL: MORE THAN HALF THE DAYS
1. FEELING NERVOUS, ANXIOUS, OR ON EDGE: NEARLY EVERY DAY
GAD7 TOTAL SCORE: 19
3. WORRYING TOO MUCH ABOUT DIFFERENT THINGS: NEARLY EVERY DAY
IF YOU CHECKED OFF ANY PROBLEMS ON THIS QUESTIONNAIRE, HOW DIFFICULT HAVE THESE PROBLEMS MADE IT FOR YOU TO DO YOUR WORK, TAKE CARE OF THINGS AT HOME, OR GET ALONG WITH OTHER PEOPLE: VERY DIFFICULT
7. FEELING AFRAID AS IF SOMETHING AWFUL MIGHT HAPPEN: MORE THAN HALF THE DAYS
6. BECOMING EASILY ANNOYED OR IRRITABLE: NEARLY EVERY DAY

## 2021-01-19 ASSESSMENT — PATIENT HEALTH QUESTIONNAIRE - PHQ9: 5. POOR APPETITE OR OVEREATING: NEARLY EVERY DAY

## 2021-01-19 NOTE — PROGRESS NOTES
Keena is a 40 year old who is being evaluated via a billable video visit.      How would you like to obtain your AVS? MyChart  If the video visit is dropped, the invitation should be resent by: Text to cell phone: [phone]  Will anyone else be joining your video visit? No    Video Start Time: 2:30 pm  Assessment & Plan     DAYANARA (generalized anxiety disorder)    The patient will stop her BuSpar and start Zoloft instead.  Palpitations were not listed as a side effect of BuSpar but they will very much correlated with her taking the medication.  It is likely she will need a higher dose of Zoloft but will start at 50 to make sure she tolerates it well.    - sertraline (ZOLOFT) 50 MG tablet; Take 1 tablet (50 mg) by mouth daily      20 minutes spent on the date of the encounter doing chart review, patient visit and documentation          Return in about 4 weeks (around 2/16/2021) for mood recheck.    Silvana Patton [facility]     Keena is a 40 year old who presents to clinic today for the following health issues  accompanied by her none  :    HPI       Anxiety Follow-Up    How are you doing with your anxiety since your last visit? Worsened     Are you having other symptoms that might be associated with anxiety? Yes:  Anxious thoughts, heart palpitations     Have you had a significant life event? No     Are you feeling depressed? No but unsure    Do you have any concerns with your use of alcohol or other drugs? No     She took celexa in the past but didn't think it helped.      Social History     Tobacco Use     Smoking status: Former Smoker     Smokeless tobacco: Never Used     Tobacco comment: smoked from ages [age]   Substance Use Topics     Alcohol use: No     Drug use: No     DAYANARA-7 SCORE 4/16/2020 11/3/2020 1/19/2021   Total Score - - -   Total Score 16 (severe anxiety) 13 (moderate anxiety) -   Total Score 16 13 19     PHQ 4/16/2020 11/3/2020 1/19/2021   PHQ-9  Total Score 8 4 -   Q9: Thoughts of better off dead/self-harm past 2 weeks Not at all Not at all Not at all     DAYANARA-7  1/19/2021   1. Feeling nervous, anxious, or on edge 3   2. Not being able to stop or control worrying 3   3. Worrying too much about different things 3   4. Trouble relaxing 3   5. Being so restless that it is hard to sit still 2   6. Becoming easily annoyed or irritable 3   7. Feeling afraid, as if something awful might happen 2   DAYANARA-7 Total Score 19   If you checked any problems, how difficult have they made it for you to do your work, take care of things at home, or get along with other people? Very difficult         How many servings of fruits and vegetables do you eat daily?  4 or more    On average, how many sweetened beverages do you drink each day (Examples: soda, juice, sweet tea, etc.  Do NOT count diet or artificially sweetened beverages)?   0    How many days per week do you exercise enough to make your heart beat faster? 3 or less    How many minutes a day do you exercise enough to make your heart beat faster? 30 - 60    How many days per week do you miss taking your medication? 0    Medication Followup of Buspar    Taking Medication as prescribed: NO-stopped taking it in the morning and is taking it right before bed     Side Effects:  Heart palpitations     Medication Helping Symptoms:  Yes        Review of Systems   Constitutional, HEENT, cardiovascular, pulmonary, gi and gu systems are negative, except as otherwise noted.      Objective           Vitals:  No vitals were obtained today due to virtual visit.    Physical Exam   GENERAL: Healthy, alert and no distress  EYES: Eyes grossly normal to inspection.  No discharge or erythema, or obvious scleral/conjunctival abnormalities.  RESP: No audible wheeze, cough, or visible cyanosis.  No visible retractions or increased work of breathing.    SKIN: Visible skin clear. No significant rash, abnormal pigmentation or lesions.  NEURO:  Cranial nerves grossly intact.  Mentation and speech appropriate for age.  PSYCH: Mentation appears normal, affect normal/bright, judgement and insight intact, normal speech and appearance well-groomed.    No results found for this or any previous visit (from the past 24 hour(s)).          Video-Visit Details    Type of service:  Video Visit    Video End Time:2:58 PM    Originating Location (pt. Location): Home    Distant Location (provider location):  [facility]     Platform used for Video Visit: GroupCard

## 2021-01-20 ASSESSMENT — ANXIETY QUESTIONNAIRES: GAD7 TOTAL SCORE: 19

## 2021-04-03 DIAGNOSIS — F41.1 GAD (GENERALIZED ANXIETY DISORDER): ICD-10-CM

## 2021-04-03 NOTE — LETTER
April 6, 2021      Keena Vincent Tobias  [address]  [place] [place] [address]        Dear Keena,     We recently received a call from your pharmacy requesting a refill of your medication- Sertraline.     A review of your chart indicates that an appointment is required with your provider. Please call the clinic at [phone] to schedule your appointment- this can be a virtual visit or in person.    We have authorized one refill of your medication to allow time for you to schedule. If you have a history of diabetes or high cholesterol, please come fasting to the appointment. Fasting entails nothing to eat or drink 8 hours prior to your appointment; with the exception of water. You may take your medication the day of the appointment.        Sincerely,        Silvana Patton DO/dannie

## 2021-04-05 NOTE — TELEPHONE ENCOUNTER
See last note, needs follow up with her pcp(dr Patton)   Will refill in her absence      Heladio Zayas D.O.

## 2021-04-05 NOTE — TELEPHONE ENCOUNTER
Routing refill request to provider for review/approval because:  Drug not on the [facility] refill protocol     Joan Woo RN

## 2021-04-06 NOTE — TELEPHONE ENCOUNTER
Tried calling patient- automated machine came on saying it was google assist and wondering what the call was about-  Said refill request- automated machine stated they are unable to take call at this time and disconnected.    Will send patient letter.    Delmy Montes MA

## 2021-04-15 ENCOUNTER — VIRTUAL VISIT (OUTPATIENT)
Dept: FAMILY MEDICINE | Facility: CLINIC | Age: 41
End: 2021-04-15
Payer: COMMERCIAL

## 2021-04-15 DIAGNOSIS — F41.1 GAD (GENERALIZED ANXIETY DISORDER): ICD-10-CM

## 2021-04-15 DIAGNOSIS — F33.1 MAJOR DEPRESSIVE DISORDER, RECURRENT EPISODE, MODERATE (H): ICD-10-CM

## 2021-04-15 DIAGNOSIS — Z12.31 ENCOUNTER FOR SCREENING MAMMOGRAM FOR BREAST CANCER: Primary | ICD-10-CM

## 2021-04-15 PROCEDURE — 96127 BRIEF EMOTIONAL/BEHAV ASSMT: CPT | Mod: 95 | Performed by: FAMILY MEDICINE

## 2021-04-15 PROCEDURE — 99214 OFFICE O/P EST MOD 30 MIN: CPT | Mod: 95 | Performed by: FAMILY MEDICINE

## 2021-04-15 RX ORDER — BUPROPION HYDROCHLORIDE 150 MG/1
TABLET, EXTENDED RELEASE ORAL
Qty: 270 TABLET | Refills: 1 | Status: SHIPPED | OUTPATIENT
Start: 2021-04-15 | End: 2021-11-12

## 2021-04-15 RX ORDER — BUSPIRONE HYDROCHLORIDE 15 MG/1
15 TABLET ORAL 2 TIMES DAILY
Qty: 180 TABLET | Refills: 1 | Status: CANCELLED | OUTPATIENT
Start: 2021-04-15

## 2021-04-15 ASSESSMENT — ANXIETY QUESTIONNAIRES
6. BECOMING EASILY ANNOYED OR IRRITABLE: SEVERAL DAYS
7. FEELING AFRAID AS IF SOMETHING AWFUL MIGHT HAPPEN: SEVERAL DAYS
IF YOU CHECKED OFF ANY PROBLEMS ON THIS QUESTIONNAIRE, HOW DIFFICULT HAVE THESE PROBLEMS MADE IT FOR YOU TO DO YOUR WORK, TAKE CARE OF THINGS AT HOME, OR GET ALONG WITH OTHER PEOPLE: NOT DIFFICULT AT ALL
2. NOT BEING ABLE TO STOP OR CONTROL WORRYING: SEVERAL DAYS
GAD7 TOTAL SCORE: 6
1. FEELING NERVOUS, ANXIOUS, OR ON EDGE: SEVERAL DAYS
5. BEING SO RESTLESS THAT IT IS HARD TO SIT STILL: NOT AT ALL
3. WORRYING TOO MUCH ABOUT DIFFERENT THINGS: SEVERAL DAYS

## 2021-04-15 ASSESSMENT — PATIENT HEALTH QUESTIONNAIRE - PHQ9
5. POOR APPETITE OR OVEREATING: SEVERAL DAYS
SUM OF ALL RESPONSES TO PHQ QUESTIONS 1-9: 2

## 2021-04-15 NOTE — PROGRESS NOTES
"Keena is a 41 year old who is being evaluated via a billable video visit.      How would you like to obtain your AVS? MyChart  If the video visit is dropped, the invitation should be resent by: Text to cell phone: [phone]  Will anyone else be joining your video visit? No      Video Start Time: 9:28 AM    Assessment & Plan     Major depressive disorder, recurrent episode, moderate (H)    The current medical regimen is effective;  continue present plan and medications.      - buPROPion (WELLBUTRIN SR) 150 MG 12 hr tablet; TAKE TWO TABLETS BY MOUTH EVERY MORNING AND TAKE ONE TABLET BY MOUTH EVERY EVENING    DAYANARA (generalized anxiety disorder)  The current medical regimen is effective;  continue present plan and medications.    - sertraline (ZOLOFT) 50 MG tablet; Take 1 tablet (50 mg) by mouth daily    Encounter for screening mammogram for breast cancer    - MA Screen Bilateral w/Pako; Future      10 minutes spent on the date of the encounter doing chart review, patient visit and documentation         Return in about 6 months (around 10/15/2021) for Routine Visit, mood recheck.    Silvana Patton DO  [facility]    Subjective   Keena is a 41 year old who presents for the following health issues     HPI     Depression and Anxiety Follow-Up    How are you doing with your depression since your last visit? \"more even\"    How are you doing with your anxiety since your last visit?  Steady     Are you having other symptoms that might be associated with depression or anxiety? No    Have you had a significant life event? No     Do you have any concerns with your use of alcohol or other drugs? No     Patient was started on Zoloft about 3 months ago.  She continues on Wellbutrin also.    Social History     Tobacco Use     Smoking status: Former Smoker     Smokeless tobacco: Never Used     Tobacco comment: smoked from ages [age]   Substance Use Topics     Alcohol use: No     Drug use: No     PHQ " 11/3/2020 1/19/2021 4/15/2021   PHQ-9 Total Score 4 - 2   Q9: Thoughts of better off dead/self-harm past 2 weeks Not at all Not at all Not at all     DAYANARA-7 SCORE 11/3/2020 1/19/2021 4/15/2021   Total Score - - -   Total Score 13 (moderate anxiety) - -   Total Score 13 19 6     Last PHQ-9 4/15/2021   1.  Little interest or pleasure in doing things 0   2.  Feeling down, depressed, or hopeless 1   3.  Trouble falling or staying asleep, or sleeping too much 0   4.  Feeling tired or having little energy 0   5.  Poor appetite or overeating 0   6.  Feeling bad about yourself 1   7.  Trouble concentrating 0   8.  Moving slowly or restless 0   Q9: Thoughts of better off dead/self-harm past 2 weeks 0   PHQ-9 Total Score 2   Difficulty at work, home, or with people Not difficult at all     DAYANARA-7  4/15/2021   1. Feeling nervous, anxious, or on edge 1   2. Not being able to stop or control worrying 1   3. Worrying too much about different things 1   4. Trouble relaxing 1   5. Being so restless that it is hard to sit still 0   6. Becoming easily annoyed or irritable 1   7. Feeling afraid, as if something awful might happen 1   DAYANARA-7 Total Score 6   If you checked any problems, how difficult have they made it for you to do your work, take care of things at home, or get along with other people? Not difficult at all       Suicide Assessment Five-step Evaluation and Treatment (SAFE-T)      How many servings of fruits and vegetables do you eat daily?  4 or more    On average, how many sweetened beverages do you drink each day (Examples: soda, juice, sweet tea, etc.  Do NOT count diet or artificially sweetened beverages)?   0    How many days per week do you exercise enough to make your heart beat faster? 3 or less    How many minutes a day do you exercise enough to make your heart beat faster? 30 - 60    How many days per week do you miss taking your medication? 0        Review of Systems   Constitutional, HEENT, cardiovascular,  pulmonary, gi and gu systems are negative, except as otherwise noted.      Objective           Vitals:  No vitals were obtained today due to virtual visit.    Physical Exam   GENERAL: Healthy, alert and no distress  EYES: Eyes grossly normal to inspection.  No discharge or erythema, or obvious scleral/conjunctival abnormalities.  RESP: No audible wheeze, cough, or visible cyanosis.  No visible retractions or increased work of breathing.    SKIN: Visible skin clear. No significant rash, abnormal pigmentation or lesions.  NEURO: Cranial nerves grossly intact.  Mentation and speech appropriate for age.  PSYCH: Mentation appears normal, affect normal/bright, judgement and insight intact, normal speech and appearance well-groomed.                Video-Visit Details    Type of service:  Video Visit    Video End Time:9:42 AM    Originating Location (pt. Location): Home    Distant Location (provider location):  [facility]     Platform used for Video Visit: [employer]

## 2021-04-16 ASSESSMENT — ANXIETY QUESTIONNAIRES: GAD7 TOTAL SCORE: 6

## 2021-04-23 ENCOUNTER — HOSPITAL ENCOUNTER (OUTPATIENT)
Dept: MAMMOGRAPHY | Facility: CLINIC | Age: 41
Discharge: HOME | End: 2021-04-23
Payer: COMMERCIAL

## 2021-04-23 ENCOUNTER — TRANSFERRED RECORDS (OUTPATIENT)
Dept: HEALTH INFORMATION MANAGEMENT | Facility: CLINIC | Age: 41
End: 2021-04-23

## 2021-04-23 DIAGNOSIS — Z12.31 VISIT FOR SCREENING MAMMOGRAM: ICD-10-CM

## 2021-04-26 ENCOUNTER — TELEPHONE (OUTPATIENT)
Dept: FAMILY MEDICINE | Facility: CLINIC | Age: 41
End: 2021-04-26

## 2021-04-26 ENCOUNTER — OFFICE VISIT (OUTPATIENT)
Dept: FAMILY MEDICINE | Facility: CLINIC | Age: 41
End: 2021-04-26
Payer: COMMERCIAL

## 2021-04-26 VITALS
OXYGEN SATURATION: 97 % | WEIGHT: 215.2 LBS | SYSTOLIC BLOOD PRESSURE: 112 MMHG | HEART RATE: 84 BPM | BODY MASS INDEX: 32.61 KG/M2 | TEMPERATURE: 98.7 F | HEIGHT: 68 IN | DIASTOLIC BLOOD PRESSURE: 64 MMHG | RESPIRATION RATE: 20 BRPM

## 2021-04-26 DIAGNOSIS — Z11.59 NEED FOR HEPATITIS C SCREENING TEST: ICD-10-CM

## 2021-04-26 DIAGNOSIS — M54.50 RIGHT-SIDED LOW BACK PAIN WITHOUT SCIATICA, UNSPECIFIED CHRONICITY: Primary | ICD-10-CM

## 2021-04-26 DIAGNOSIS — E89.0 POSTOPERATIVE HYPOTHYROIDISM: ICD-10-CM

## 2021-04-26 DIAGNOSIS — M54.50 RIGHT-SIDED LOW BACK PAIN WITHOUT SCIATICA, UNSPECIFIED CHRONICITY: ICD-10-CM

## 2021-04-26 LAB
HCV AB SERPL QL IA: NONREACTIVE
TSH SERPL DL<=0.005 MIU/L-ACNC: <0.01 MU/L (ref 0.4–4)

## 2021-04-26 PROCEDURE — 84443 ASSAY THYROID STIM HORMONE: CPT | Performed by: FAMILY MEDICINE

## 2021-04-26 PROCEDURE — 99214 OFFICE O/P EST MOD 30 MIN: CPT | Performed by: FAMILY MEDICINE

## 2021-04-26 PROCEDURE — 86803 HEPATITIS C AB TEST: CPT | Performed by: FAMILY MEDICINE

## 2021-04-26 PROCEDURE — 84439 ASSAY OF FREE THYROXINE: CPT | Performed by: FAMILY MEDICINE

## 2021-04-26 PROCEDURE — 36415 COLL VENOUS BLD VENIPUNCTURE: CPT | Performed by: FAMILY MEDICINE

## 2021-04-26 RX ORDER — CYCLOBENZAPRINE HCL 10 MG
5-10 TABLET ORAL
Qty: 30 TABLET | Refills: 1 | Status: SHIPPED | OUTPATIENT
Start: 2021-04-26 | End: 2021-04-26

## 2021-04-26 RX ORDER — CYCLOBENZAPRINE HCL 10 MG
5-10 TABLET ORAL
Qty: 30 TABLET | Refills: 1 | Status: SHIPPED | OUTPATIENT
Start: 2021-04-26 | End: 2021-08-20

## 2021-04-26 ASSESSMENT — MIFFLIN-ST. JEOR: SCORE: 1681.7

## 2021-04-26 ASSESSMENT — PAIN SCALES - GENERAL: PAINLEVEL: MILD PAIN (3)

## 2021-04-26 NOTE — TELEPHONE ENCOUNTER
Reason for Call:  Medication or medication refill:    Do you use a [facility]?  Name of the pharmacy and phone number for the current request:       [id number] - [place], [place] - [facility] [address] [facility] [address]      Name of the medication requested: cyclobenzaprine (FLEXERIL) 10 MG tabletcyclobenzaprine (FLEXERIL) 10 MG tablet    Other request: Patient had provider [place] script to [place] pharmacy but she wasn't aware they didn't open until 9 and she needed to get to work. She would like to have the script sent to her [facility] instead. Please call with any questions.     Can we leave a detailed message on this number? YES    Phone number patient can be reached at: Home number on file [phone] (home)    Best Time: any     Call taken on 4/26/2021 at 8:53 AM by Susana Mock

## 2021-04-26 NOTE — PROGRESS NOTES
"    Assessment & Plan     Right-sided low back pain without sciatica, unspecified chronicity    The patient has pain at the insertion of her glutes.  We will have her try figure-of-four stretch and muscle relaxers.  If she does not see improvement in a few weeks I would recommend physical therapy    - cyclobenzaprine (FLEXERIL) 10 MG tablet; Take 0.5-1 tablets (5-10 mg) by mouth nightly as needed for muscle spasms    Postoperative hypothyroidism    - TSH WITH FREE T4 REFLEX    Need for hepatitis C screening test    - Hepatitis C Screen Reflex to HCV RNA Quant and Genotype    38 minutes spent on the date of the encounter doing chart review, patient visit and documentation        BMI:   Estimated body mass index is 33.21 kg/m  as calculated from the following:    Height as of this encounter: 1.715 m (5' 7.5\").    Weight as of this encounter: 97.6 kg (215 lb 3.2 oz).   Weight management plan: Discussed healthy diet and exercise guidelines    Work on weight loss  Regular exercise    Return in about 4 weeks (around 5/24/2021) for pain.    Silvana Patton DO  [facility]    Murray Brink is a 41 year old who presents for the following health issues  accompanied by her self:    HPI     Musculoskeletal problem/pain  Onset/Duration: 1 month  Description  Location: Hip - right  Joint Swelling: no  Redness: no  Pain: YES  Warmth: no  Intensity:  mild, moderate  Progression of Symptoms:  worsening  Accompanying signs and symptoms:   Fevers: no  Numbness/tingling/weakness: no  History  Trauma to the area: no  Recent illness:  no  Previous similar problem: no  Previous evaluation:  no  Precipitating or alleviating factors:  Aggravating factors include: sitting and laying down  Therapies tried and outcome: rest/inactivity, heat, ice, acetaminophen and Ibuprofen- a little relief    She also has pain in her left ankle after multiple ankle sprains.  She has some mid back pain that responds to her " "roller.  She has some clicking also.        Hypothyroidism Follow-up      Since last visit, patient describes the following symptoms: Weight stable, no hair loss, no skin changes, no constipation, no loose stools    TSH   Date Value Ref Range Status   10/03/2019 <0.01 (L) 0.40 - 4.00 mU/L Final       synthroid 200 mcg daily       Review of Systems   Constitutional, HEENT, cardiovascular, pulmonary, gi and gu systems are negative, except as otherwise noted.      Objective    /64 (BP Location: Right arm, Patient Position: Sitting, Cuff Size: Adult Large)   Pulse 84   Temp 98.7  F (37.1  C) (Oral)   Resp 20   Ht 1.715 m (5' 7.5\")   Wt 97.6 kg (215 lb 3.2 oz)   LMP 04/11/2021   SpO2 97%   BMI 33.21 kg/m    Body mass index is 33.21 kg/m .  Physical Exam   GENERAL: healthy, alert and no distress  MS: Normal hip exam except tenderness over the right greater trochanteric prominence   SKIN: no suspicious lesions or rashes  NEURO: Normal strength and tone, mentation intact and speech normal  Comprehensive back pain exam:  Tenderness of Right SI joint and sacral base, Range of motion not limited by pain, Lower extremity reflexes within normal limits bilaterally and Lower extremity sensation normal and equal on both sides  PSYCH: mentation appears normal, affect normal/bright        "

## 2021-04-26 NOTE — TELEPHONE ENCOUNTER
Routing to PCP    Patient requesting Flexeril be sent to another pharmacy.    RN pended.    RN to cancel prescription at [facility] once script sent to Dang Salamanca RN, BSN, PHN  [facility]

## 2021-04-27 LAB — T4 FREE SERPL-MCNC: 1.56 NG/DL (ref 0.76–1.46)

## 2021-05-12 ENCOUNTER — MYC MEDICAL ADVICE (OUTPATIENT)
Dept: FAMILY MEDICINE | Facility: CLINIC | Age: 41
End: 2021-05-12

## 2021-05-12 DIAGNOSIS — M54.50 RIGHT-SIDED LOW BACK PAIN WITHOUT SCIATICA, UNSPECIFIED CHRONICITY: Primary | ICD-10-CM

## 2021-05-12 NOTE — TELEPHONE ENCOUNTER
Routing to PCP    Patient would like PT referral    Office visit 4-    Right-sided low back pain without sciatica, unspecified chronicity     The patient has pain at the insertion of her glutes.  We will have her try figure-of-four stretch and muscle relaxers.  If she does not see improvement in a few weeks I would recommend physical therapy    Bronson Salamanca RN, BSN, PHN  [facility]

## 2021-05-13 ENCOUNTER — MYC MEDICAL ADVICE (OUTPATIENT)
Dept: FAMILY MEDICINE | Facility: CLINIC | Age: 41
End: 2021-05-13

## 2021-05-14 ENCOUNTER — TRANSFERRED RECORDS (OUTPATIENT)
Dept: HEALTH INFORMATION MANAGEMENT | Facility: CLINIC | Age: 41
End: 2021-05-14

## 2021-06-23 ENCOUNTER — TELEPHONE (OUTPATIENT)
Dept: FAMILY MEDICINE | Facility: CLINIC | Age: 41
End: 2021-06-23

## 2021-06-24 NOTE — TELEPHONE ENCOUNTER
PA Initiation    Medication: buPROPion (WELLBUTRIN SR) 150 MG 12 hr tablet  Insurance Company: [employer] - Phone [phone] Fax [phone]  Pharmacy Filling the Rx: [facility] - [place], [place] - [facility] [address]  Filling Pharmacy Phone: [phone]  Filling Pharmacy Fax: [phone]  Start Date: 6/24/2021

## 2021-06-24 NOTE — TELEPHONE ENCOUNTER
Prior Authorization Approval    Authorization Effective Date: 6/24/2021  Authorization Expiration Date: 6/24/2022  Medication: buPROPion (WELLBUTRIN SR) 150 MG 12 hr tablet  Approved Dose/Quantity:   Reference #: [id number]   Insurance Company: "[employer] - Phone [phone] Fax [phone]  Expected CoPay:       CoPay Card Available:      Foundation Assistance Needed:    Which Pharmacy is filling the prescription (Not needed for infusion/clinic administered): [facility] - [facility] [address]  Pharmacy Notified: Yes  Patient Notified: Yes  **Instructed pharmacy to notify patient when script is ready to /ship.**

## 2021-07-26 ENCOUNTER — ANCILLARY PROCEDURE (OUTPATIENT)
Dept: GENERAL RADIOLOGY | Facility: CLINIC | Age: 41
End: 2021-07-26
Attending: FAMILY MEDICINE
Payer: COMMERCIAL

## 2021-07-26 ENCOUNTER — OFFICE VISIT (OUTPATIENT)
Dept: FAMILY MEDICINE | Facility: CLINIC | Age: 41
End: 2021-07-26
Payer: COMMERCIAL

## 2021-07-26 VITALS
BODY MASS INDEX: 31.67 KG/M2 | HEIGHT: 68 IN | RESPIRATION RATE: 16 BRPM | SYSTOLIC BLOOD PRESSURE: 120 MMHG | TEMPERATURE: 98.2 F | DIASTOLIC BLOOD PRESSURE: 74 MMHG | HEART RATE: 97 BPM | WEIGHT: 209 LBS | OXYGEN SATURATION: 97 %

## 2021-07-26 DIAGNOSIS — M25.572 PAIN IN JOINT INVOLVING ANKLE AND FOOT, LEFT: ICD-10-CM

## 2021-07-26 DIAGNOSIS — M25.572 PAIN IN JOINT INVOLVING ANKLE AND FOOT, LEFT: Primary | ICD-10-CM

## 2021-07-26 PROCEDURE — 73610 X-RAY EXAM OF ANKLE: CPT | Mod: LT | Performed by: RADIOLOGY

## 2021-07-26 PROCEDURE — 99214 OFFICE O/P EST MOD 30 MIN: CPT | Performed by: FAMILY MEDICINE

## 2021-07-26 ASSESSMENT — PAIN SCALES - GENERAL: PAINLEVEL: SEVERE PAIN (6)

## 2021-07-26 ASSESSMENT — MIFFLIN-ST. JEOR: SCORE: 1653.58

## 2021-07-26 NOTE — PROGRESS NOTES
"    Assessment & Plan     Pain in joint involving ankle and foot, left    The patient's ankle is not causing daily pain.  I would like her to see an orthopedic specializing in foot and ankle.  She is likely to need an MRI but will defer this to specialty.    - Orthopedic  Referral; Future  - XR Ankle Left G/E 3 Views; Future      30 minutes spent on the date of the encounter doing chart review, review of test results, interpretation of tests, patient visit and documentation       Return in about 6 months (around 1/26/2022) for Physical Exam.    Silvana Patton DO  [facility]    Murray Brink is a 41 year old who presents for the following health issues     HPI     Musculoskeletal problem/pain  Onset/Duration: > a year sore and weak   Description  Location: ankle - left  Joint Swelling: YES  Redness: no  Pain: YES  Warmth: no  Intensity:  Mild to moderate  Progression of Symptoms:  worsening  Accompanying signs and symptoms:   Fevers: no  Numbness/tingling/weakness: YES- weakness  History  Trauma to the area: no  Recent illness:  no  Previous similar problem: YES in the past  Previous evaluation:  no  Precipitating or alleviating factors:  Aggravating factors include:  \"anything\"  Therapies tried and outcome: rest/inactivity, ice, stretching, exercises, support wrap and Ibuprofen    Swelling started 3 months ago.  She denies any history of fracture.  The pain is medial.  She has sprained it many times both medial and laterally.          Review of Systems   Constitutional, HEENT, cardiovascular, pulmonary, gi and gu systems are negative, except as otherwise noted.      Objective    /74 (BP Location: Right arm, Patient Position: Sitting, Cuff Size: Adult Large)   Pulse 97   Temp 98.2  F (36.8  C) (Oral)   Resp 16   Ht 1.715 m (5' 7.5\")   Wt 94.8 kg (209 lb)   SpO2 97%   BMI 32.25 kg/m    Body mass index is 32.25 kg/m .  Physical Exam   GENERAL: healthy, alert and " no distress  MS: Left ankle exam, tender medial malleoli and surrounding ligaments, positive anterior drawer test, non-tender lateral malleoli, non tender mid foot and forefoot,  Full ROM, ligaments non-tender, no swelling or bruising    SKIN: no suspicious lesions or rashes  NEURO: Normal strength and tone, sensory exam grossly normal, mentation intact and positive Tinel's sign at the tibia    Xray - Reviewed and interpreted by radiology normal

## 2021-08-06 ENCOUNTER — OFFICE VISIT (OUTPATIENT)
Dept: ORTHOPEDICS | Facility: CLINIC | Age: 41
End: 2021-08-06
Attending: FAMILY MEDICINE
Payer: COMMERCIAL

## 2021-08-06 VITALS
DIASTOLIC BLOOD PRESSURE: 78 MMHG | HEIGHT: 68 IN | SYSTOLIC BLOOD PRESSURE: 112 MMHG | BODY MASS INDEX: 31.67 KG/M2 | WEIGHT: 209 LBS

## 2021-08-06 DIAGNOSIS — M25.572 PAIN IN JOINT INVOLVING ANKLE AND FOOT, LEFT: ICD-10-CM

## 2021-08-06 DIAGNOSIS — M76.822 TIBIALIS POSTERIOR TENDONITIS, LEFT: Primary | ICD-10-CM

## 2021-08-06 PROCEDURE — 76942 ECHO GUIDE FOR BIOPSY: CPT | Performed by: FAMILY MEDICINE

## 2021-08-06 PROCEDURE — 99204 OFFICE O/P NEW MOD 45 MIN: CPT | Mod: 25 | Performed by: FAMILY MEDICINE

## 2021-08-06 PROCEDURE — 20550 NJX 1 TENDON SHEATH/LIGAMENT: CPT | Mod: LT | Performed by: FAMILY MEDICINE

## 2021-08-06 RX ADMIN — BETAMETHASONE SODIUM PHOSPHATE AND BETAMETHASONE ACETATE 6 MG: 3; 3 INJECTION, SUSPENSION INTRA-ARTICULAR; INTRALESIONAL; INTRAMUSCULAR; SOFT TISSUE at 15:08

## 2021-08-06 RX ADMIN — ROPIVACAINE HYDROCHLORIDE 2 ML: 5 INJECTION, SOLUTION EPIDURAL; INFILTRATION; PERINEURAL at 15:08

## 2021-08-06 ASSESSMENT — MIFFLIN-ST. JEOR: SCORE: 1653.58

## 2021-08-06 NOTE — PATIENT INSTRUCTIONS
# Left Posterior Tibialis Tendon Dysfunction: No over the past several months but has had this issue chronically since she was a child.  She does have pain over the medial portion of her ankle worse with up and down stairs as well as walking.  She does have pain over the posterior tibialis tendon noted to be behind the medial malleolus.  Reviewed x-ray showing no significant arthritis.  Ultrasound showing swelling and irritation of the posterior tibialis tendon.  She also has hindfoot valgus likely contributing to her symptoms. Counseled patient on nature of condition and treatment options.  Given this plan as below, follow-up as needed.  Image Findings: No arthritis on x-ray, irritation of the posterior tibialis tendon on ultrasound  Treatment: Activities as tolerated, home exercises given today  Job: No restrictions  Medications/Injections: Limited tylenol/ibuprofen for pain for 1-2 weeks, left posterior tibialis tendon steroid injection  Follow-up: In one month if symptoms do not improve, sooner if worsening  Can consider physical therapy    Please call [phone]   Ask for my team if you have any questions or concerns    It was great seeing you today!    Adams More MD, CAQSM     Patient Education     Understanding Posterior Tibialis Tenosynovitis     The posterior tibialis tendon runs along the inside of the foot. It connects the calf muscle (posterior tibialis muscle) to bones on the inside of the foot. It helps maintain the arch of the foot. It also gives you stability when you move. Posterior tibialis tenosynovitis is when this tendon becomes inflamed or torn.    How to say it  [id number]   What causes posterior tibialis tenosynovitis?   This condition is often caused by an injury to the tendon. For instance, a fall can tear the tendon. Overuse can also damage it. People who play sports like basketball or tennis a lot may weaken the tendon over time.   Symptoms of posterior  tibialis tenosynovitis   The symptoms of this condition include pain and swelling. The pain is usually felt near the tendon, on the inside of the foot and ankle. It often gets worse over time or with an increase in activity. Your arch may eventually fall, leading to a flat foot. Your foot may also start to turn outward.   Treatment for posterior tibialis tenosynovitis   Treatment for this condition depends on the severity of the tear. Treatment may include:     Rest. Don't do any activities that cause pain and swelling. You may also need to limit the amount of weight you put on your injured foot.    Cold packs. Putting a cold pack on the tendon may reduce pain and swelling.    Medicine. Over-the-counter medicines can reduce pain and swelling. NSAIDs (nonsteroidal anti-inflammatory drugs) are the most common medicines used. Medicines may be prescribed or bought over the counter. They may be given as pills. Or they may be put on the skin as a gel, cream, or patch.    Leg cast or walking boot.  Severe tears of the posterior tibialis tendon may need to be kept from moving. These devices can help protect the tendon and reduce swelling.    Shoe insert or brace. Like a leg cast or walking boot, these devices can help protect the tendon as it heals. They may also ease pain.    Strengthening and stretching exercises. Certain exercises can help you regain strength and flexibility in your foot.    Surgery. Several surgical choices are available to fix the torn tendon or replace it. But you often don't need surgery unless your symptoms don't get better after trying other treatments for at least 6 months.  When to call your healthcare provider   Call your healthcare provider right away if you have any of these:    Fever of 100.4 F (38 C) or higher, or as directed by your provider    Chills    Pain that gets worse    Symptoms that don t get better, or get worse    New symptoms  Disha last reviewed this educational content on  6/1/2019 2000-2021 [employer]. All rights reserved. This information is not intended as a substitute for professional medical care. Always follow your healthcare professional's instructions.           [facility] Injection Discharge Instructions    Procedure: left posterior tibialis tendon steroid injection       You may shower, however avoid swimming, tub baths or hot tubs for 24 hours following your procedure    You may have a mild to moderate increase in pain for several days following the injection.    It may take up to 14 days for the steroid medication to start working although you may feel the effect as early as a few days after the procedure.    You may use ice packs for 10-15 minutes, 3 to 4 times a day at the injection site for comfort    You may use anti-inflammatory medications (such as Ibuprofen or Aleve or Advil) or Tylenol for pain control if necessary    If you were fasting, you may resume your normal diet and medications after the procedure    If you have diabetes, check your blood sugar more frequently than usual as your blood sugar may be higher than normal for 10-14 days following a steroid injection. Contact your doctor who manages your diabetes if your blood sugar is higher than usual      If you experience any of the following, call [facility] @ [phone] or [phone]  -Fever over 100 degree F  -Swelling, bleeding, redness, drainage, warmth at the injection site  - New or worsening pain

## 2021-08-06 NOTE — PROGRESS NOTES
ASSESSMENT & PLAN    Keena was seen today for pain and pain.    Diagnoses and all orders for this visit:    Tibialis posterior tendonitis, left  -     Medium Joint Injection/Arthrocentesis: L ankle    Pain in joint involving ankle and foot, left  -     Orthopedic  Referral      This issue is acute on chronic and Worsening.    # Left Posterior Tibialis Tendon Dysfunction: No over the past several months but has had this issue chronically since she was a child.  She does have pain over the medial portion of her ankle worse with up and down stairs as well as walking.  She does have pain over the posterior tibialis tendon noted to be behind the medial malleolus.  Reviewed x-ray showing no significant arthritis.  Ultrasound showing swelling and irritation of the posterior tibialis tendon.  She also has hindfoot valgus likely contributing to her symptoms. Counseled patient on nature of condition and treatment options.  Given this plan as below, follow-up as needed.  Image Findings: No arthritis on x-ray, irritation of the posterior tibialis tendon on ultrasound  Treatment: Activities as tolerated, home exercises given today  Job: No restrictions  Medications/Injections: Limited tylenol/ibuprofen for pain for 1-2 weeks, left posterior tibialis tendon steroid injection  Follow-up: In one month if symptoms do not improve, sooner if worsening  Can consider physical therapy    Adams More MD  [facility] [place]    Ultrasound Findings: increased fluid over left posterior tibialis tendon     Please do not bill    -----  Chief Complaint   Patient presents with     Left Ankle - Pain     Left Foot - Pain       SUBJECTIVE  Keena Tobias is a/an 41 year old female who is seen in consultation at the request of  Silvana Patton M.D. for evaluation of left ankle.     The patient is seen by themselves.    Onset: Several month(s) ago. Reports insidious onset without acute  "precipitating event. States that she has had multiple twisting injuries.  Saw PCP 7/26/21 and left ankle xrays were performed.  Has HO multiple inversion injuries.  Affecting ability to walk.  Using brace frequently   Location of Pain: left medial ankle and medial heel   Worsened by: going up and down stairs, walking hills, walking, weight bearing   Better with: elevation, ice, ibuprofen   Treatments tried:  elevation, ice, ibuprofen, brace, rest  Associated symptoms: swelling    Orthopedic/Surgical history: YES - multiple sprains   Social History/Occupation:      No family history pertinent to patient's problem today.      REVIEW OF SYSTEMS:  Review of Systems  Constitutional, HEENT, cardiovascular, pulmonary, GI, , musculoskeletal, neuro, skin, endocrine and psych systems are negative, except as otherwise noted.    OBJECTIVE:  /78   Ht 1.715 m (5' 7.5\")   Wt 94.8 kg (209 lb)   BMI 32.25 kg/m     General: healthy, alert and in no distress  HEENT: no scleral icterus or conjunctival erythema  Skin: no suspicious lesions or rash. No jaundice.  CV: distal perfusion intact    Resp: normal respiratory effort without conversational dyspnea   Psych: normal mood and affect  Gait: normal steady gait with appropriate coordination and balance    Neuro: Normal light sensory exam of left lower extremity     LEFT ANKLE  Inspection:  Hindfoot valgus, too many toes sign left > right  Palpation:    Tender about the posterior tibialis tenond. Remainder of bony and ligamentous landmarks are nontender.  Range of Motion:     Plantarflexion full / dorsiflexion full / inversion full / eversion full  Strength:    full  Special Tests:    negative anterior drawer, negative talar tilt, negative valgus stress, negative forced external rotation/eversion, positive Schafer sign, negative squeeze test       RADIOLOGY:  I independently, visualized and reviewed these images with the patient     XR ANKLE LEFT G/E 3 VIEWS " 7/26/2021 11:29 AM      HISTORY: Pain in joint involving ankle and foot, left                                                                      IMPRESSION: No apparent fracture. The ankle mortise appears congruent.  Plantar calcaneal spur is noted.     MITZI MARTINEZ MD     Review of external notes as documented elsewhere in note  Review of the result(s) of each unique test - left ankle x-ray  40 minutes spent on the date of the encounter doing chart review, history and exam, documentation and further activities per the note   0956}     Medium Joint Injection/Arthrocentesis: L ankle    Date/Time: 8/6/2021 3:08 PM  Performed by: Adams More MD  Authorized by: Adams More MD     Indications:  Pain  Needle Size:  25 G  Guidance: ultrasound    Approach:  Medial  Location:  Ankle  Location comment:  Left posterior tibialis tendon  Site:  L ankle  Medications:  6 mg betamethasone acet & sod phos 6 (3-3) MG/ML; 2 mL ropivacaine 5 MG/ML  Outcome:  Tolerated well, no immediate complications  Procedure discussed: discussed risks, benefits, and alternatives    Consent Given by:  Patient  Timeout: timeout called immediately prior to procedure    Prep: patient was prepped and draped in usual sterile fashion

## 2021-08-06 NOTE — LETTER
8/6/2021         RE: Keena Tobias  [address]  [facility] [address]        Dear Colleague,    Thank you for referring your patient, Keena Tobias, to the [facility]. Please see a copy of my visit note below.    ASSESSMENT & PLAN    Keena was seen today for pain and pain.    Diagnoses and all orders for this visit:    Tibialis posterior tendonitis, left  -     Medium Joint Injection/Arthrocentesis: L ankle    Pain in joint involving ankle and foot, left  -     Orthopedic  Referral      This issue is acute on chronic and Worsening.    # Left Posterior Tibialis Tendon Dysfunction: No over the past several months but has had this issue chronically since she was a child.  She does have pain over the medial portion of her ankle worse with up and down stairs as well as walking.  She does have pain over the posterior tibialis tendon noted to be behind the medial malleolus.  Reviewed x-ray showing no significant arthritis.  Ultrasound showing swelling and irritation of the posterior tibialis tendon.  She also has hindfoot valgus likely contributing to her symptoms. Counseled patient on nature of condition and treatment options.  Given this plan as below, follow-up as needed.  Image Findings: No arthritis on x-ray, irritation of the posterior tibialis tendon on ultrasound  Treatment: Activities as tolerated, home exercises given today  Job: No restrictions  Medications/Injections: Limited tylenol/ibuprofen for pain for 1-2 weeks, left posterior tibialis tendon steroid injection  Follow-up: In one month if symptoms do not improve, sooner if worsening  Can consider physical therapy    Adams More MD  [facility]    Ultrasound Findings: increased fluid over left posterior tibialis tendon     Please do not bill    -----  Chief Complaint   Patient presents with     Left Ankle - Pain     Left Foot - Pain  "      SUBJECTIVE  Keena Tobias is a/an 41 year old female who is seen in consultation at the request of  Silvana Patton M.D. for evaluation of left ankle.     The patient is seen by themselves.    Onset: Several month(s) ago. Reports insidious onset without acute precipitating event. States that she has had multiple twisting injuries.  Saw PCP 7/26/21 and left ankle xrays were performed.  Has HO multiple inversion injuries.  Affecting ability to walk.  Using brace frequently   Location of Pain: left medial ankle and medial heel   Worsened by: going up and down stairs, walking hills, walking, weight bearing   Better with: elevation, ice, ibuprofen   Treatments tried:  elevation, ice, ibuprofen, brace, rest  Associated symptoms: swelling    Orthopedic/Surgical history: YES - multiple sprains   Social History/Occupation:      No family history pertinent to patient's problem today.      REVIEW OF SYSTEMS:  Review of Systems  Constitutional, HEENT, cardiovascular, pulmonary, GI, , musculoskeletal, neuro, skin, endocrine and psych systems are negative, except as otherwise noted.    OBJECTIVE:  /78   Ht 1.715 m (5' 7.5\")   Wt 94.8 kg (209 lb)   BMI 32.25 kg/m     General: healthy, alert and in no distress  HEENT: no scleral icterus or conjunctival erythema  Skin: no suspicious lesions or rash. No jaundice.  CV: distal perfusion intact    Resp: normal respiratory effort without conversational dyspnea   Psych: normal mood and affect  Gait: normal steady gait with appropriate coordination and balance    Neuro: Normal light sensory exam of left lower extremity     LEFT ANKLE  Inspection:  Hindfoot valgus, too many toes sign left > right  Palpation:    Tender about the posterior tibialis tenond. Remainder of bony and ligamentous landmarks are nontender.  Range of Motion:     Plantarflexion full / dorsiflexion full / inversion full / eversion full  Strength:    full  Special Tests:    negative " anterior drawer, negative talar tilt, negative valgus stress, negative forced external rotation/eversion, positive Schafer sign, negative squeeze test       RADIOLOGY:  I independently, visualized and reviewed these images with the patient     XR ANKLE LEFT G/E 3 VIEWS 7/26/2021 11:29 AM      HISTORY: Pain in joint involving ankle and foot, left                                                                      IMPRESSION: No apparent fracture. The ankle mortise appears congruent.  Plantar calcaneal spur is noted.     MITZI MARTINEZ MD     Review of external notes as documented elsewhere in note  Review of the result(s) of each unique test - left ankle x-ray  40 minutes spent on the date of the encounter doing chart review, history and exam, documentation and further activities per the note   0956}     Medium Joint Injection/Arthrocentesis: L ankle    Date/Time: 8/6/2021 3:08 PM  Performed by: Adams More MD  Authorized by: Adams More MD     Indications:  Pain  Needle Size:  25 G  Guidance: ultrasound    Approach:  Medial  Location:  Ankle  Location comment:  Left posterior tibialis tendon  Site:  L ankle  Medications:  6 mg betamethasone acet & sod phos 6 (3-3) MG/ML; 2 mL ropivacaine 5 MG/ML  Outcome:  Tolerated well, no immediate complications  Procedure discussed: discussed risks, benefits, and alternatives    Consent Given by:  Patient  Timeout: timeout called immediately prior to procedure    Prep: patient was prepped and draped in usual sterile fashion              Again, thank you for allowing me to participate in the care of your patient.        Sincerely,        Adams More MD

## 2021-08-07 RX ORDER — ROPIVACAINE HYDROCHLORIDE 5 MG/ML
2 INJECTION, SOLUTION EPIDURAL; INFILTRATION; PERINEURAL
Status: DISCONTINUED | OUTPATIENT
Start: 2021-08-06 | End: 2022-06-10

## 2021-08-07 RX ORDER — BETAMETHASONE SODIUM PHOSPHATE AND BETAMETHASONE ACETATE 3; 3 MG/ML; MG/ML
6 INJECTION, SUSPENSION INTRA-ARTICULAR; INTRALESIONAL; INTRAMUSCULAR; SOFT TISSUE
Status: DISCONTINUED | OUTPATIENT
Start: 2021-08-06 | End: 2022-06-10

## 2021-08-20 ENCOUNTER — HOSPITAL ENCOUNTER (EMERGENCY)
Facility: CLINIC | Age: 41
Discharge: HOME | End: 2021-08-20
Attending: EMERGENCY MEDICINE | Admitting: EMERGENCY MEDICINE
Payer: COMMERCIAL

## 2021-08-20 ENCOUNTER — APPOINTMENT (OUTPATIENT)
Dept: GENERAL RADIOLOGY | Facility: CLINIC | Age: 41
End: 2021-08-20
Attending: EMERGENCY MEDICINE
Payer: COMMERCIAL

## 2021-08-20 ENCOUNTER — APPOINTMENT (OUTPATIENT)
Dept: CT IMAGING | Facility: CLINIC | Age: 41
End: 2021-08-20
Attending: EMERGENCY MEDICINE
Payer: COMMERCIAL

## 2021-08-20 VITALS
TEMPERATURE: 98.7 F | RESPIRATION RATE: 16 BRPM | SYSTOLIC BLOOD PRESSURE: 125 MMHG | DIASTOLIC BLOOD PRESSURE: 80 MMHG | HEART RATE: 72 BPM | OXYGEN SATURATION: 98 %

## 2021-08-20 DIAGNOSIS — S00.83XA CONTUSION OF FACE, SCALP AND NECK, INITIAL ENCOUNTER: ICD-10-CM

## 2021-08-20 DIAGNOSIS — S10.93XA CONTUSION OF FACE, SCALP AND NECK, INITIAL ENCOUNTER: ICD-10-CM

## 2021-08-20 DIAGNOSIS — S16.1XXA STRAIN OF NECK MUSCLE, INITIAL ENCOUNTER: ICD-10-CM

## 2021-08-20 DIAGNOSIS — S00.03XA CONTUSION OF FACE, SCALP AND NECK, INITIAL ENCOUNTER: ICD-10-CM

## 2021-08-20 DIAGNOSIS — S01.81XA FACIAL LACERATION, INITIAL ENCOUNTER: ICD-10-CM

## 2021-08-20 LAB
HCG UR QL: NEGATIVE
HOLD SPECIMEN: NORMAL
INTERNAL QC OK POCT: NORMAL

## 2021-08-20 PROCEDURE — 250N000013 HC RX MED GY IP 250 OP 250 PS 637: Performed by: EMERGENCY MEDICINE

## 2021-08-20 PROCEDURE — 99284 EMERGENCY DEPT VISIT MOD MDM: CPT | Performed by: EMERGENCY MEDICINE

## 2021-08-20 PROCEDURE — 99284 EMERGENCY DEPT VISIT MOD MDM: CPT | Mod: 25

## 2021-08-20 PROCEDURE — 72040 X-RAY EXAM NECK SPINE 2-3 VW: CPT

## 2021-08-20 PROCEDURE — 70486 CT MAXILLOFACIAL W/O DYE: CPT

## 2021-08-20 PROCEDURE — 81025 URINE PREGNANCY TEST: CPT | Performed by: EMERGENCY MEDICINE

## 2021-08-20 RX ORDER — IBUPROFEN 600 MG/1
600 TABLET, FILM COATED ORAL ONCE
Status: COMPLETED | OUTPATIENT
Start: 2021-08-20 | End: 2021-08-20

## 2021-08-20 RX ORDER — CYCLOBENZAPRINE HCL 10 MG
10 TABLET ORAL 3 TIMES DAILY PRN
Qty: 20 TABLET | Refills: 0 | Status: SHIPPED | OUTPATIENT
Start: 2021-08-20 | End: 2021-08-26

## 2021-08-20 RX ADMIN — IBUPROFEN 600 MG: 600 TABLET, FILM COATED ORAL at 10:57

## 2021-08-20 ASSESSMENT — ENCOUNTER SYMPTOMS
DIFFICULTY URINATING: 0
ABDOMINAL PAIN: 0
ARTHRALGIAS: 0
NECK PAIN: 1
COLOR CHANGE: 0
SHORTNESS OF BREATH: 0
FEVER: 0
HEADACHES: 0
EYE REDNESS: 0
WOUND: 1
BACK PAIN: 1
CONFUSION: 0
NECK STIFFNESS: 0
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
## 2021-09-07 ENCOUNTER — MYC MEDICAL ADVICE (OUTPATIENT)
Dept: NUTRITION | Facility: CLINIC | Age: 41
End: 2021-09-07

## 2021-09-07 ENCOUNTER — TELEPHONE (OUTPATIENT)
Dept: EDUCATION SERVICES | Facility: CLINIC | Age: 41
End: 2021-09-07

## 2021-09-07 DIAGNOSIS — E66.9 OBESITY, UNSPECIFIED CLASSIFICATION, UNSPECIFIED OBESITY TYPE, UNSPECIFIED WHETHER SERIOUS COMORBIDITY PRESENT: Primary | ICD-10-CM

## 2021-09-07 NOTE — TELEPHONE ENCOUNTER
Hi Dr. Patton,    It has been more than 1 year since Keena's last nutrition referral and she would like some more help again.  Could you please order a Nutrition referral for her?    Thanks,  Moni Stephenson,  KRYSTALN, LD, [facility]

## 2021-09-09 ENCOUNTER — PATIENT OUTREACH (OUTPATIENT)
Dept: EDUCATION SERVICES | Facility: CLINIC | Age: 41
End: 2021-09-09
Payer: COMMERCIAL

## 2021-09-09 DIAGNOSIS — E66.9 OBESITY, UNSPECIFIED CLASSIFICATION, UNSPECIFIED OBESITY TYPE, UNSPECIFIED WHETHER SERIOUS COMORBIDITY PRESENT: Primary | ICD-10-CM

## 2021-09-09 DIAGNOSIS — F50.89 COMPULSIVE OVEREATING: ICD-10-CM

## 2021-09-09 PROCEDURE — 97803 MED NUTRITION INDIV SUBSEQ: CPT | Mod: 95

## 2021-09-09 NOTE — PROGRESS NOTES
Medical Nutrition Therapy  Type of Service: Telephone Visit    How would patient like to obtain AVS? MyCFirth     Visit Type: Reassessment and intervention    Keena Tobias presents today for MNT and education related to weight management.   She is accompanied by self.     ASSESSMENT:   Patient comments/concerns relating to nutrition: Patient's weight loss has reached a plateau and looking for ideas to continue to reduce calories.  Feels not need 1/2 starch with dinner and could do a smaller bedtime snack.     Not have preference on changes.  She is ok with eating carbohydrates and not prefer low carbohydrate over higher carbohydrate.  Continues to try to make most meals at home.     Has question on food and how much sugar is a trigger- sweets, candy, donuts.  Says yogurt is plain Greek.     If sugar is not added, is it ok to look at total sugar in the serving.     Patient follows this pattern for eating from OA WISH[facility] program. Calls in food to sponsor, weighs and measures her food:    Breakfast  One protein  One starch  One fruit  One dairy  One fat     Morning snack  Two protein  One vegetable     Lunch  Three protein  One starch  Two veg  One fat  One fruit     Dinner  Four protein  1 and 1/2 starches  Two vegetable  Two fat     Bedtime snack  One dairy  One fruit    NUTRITION HISTORY:    Breakfast: 1 banana, 1 oz oats, 1/2 oz cream cheese, 1/2 oz nuts, 8oz mix milk and yogurt (same but varies fruit with the breakfast, plum, 6 oz grapes, apple)  AM: 1 cup mixed veggies and 1 protein bar ([employer]- low sugar- 190, 21 gm protein) OR veggies and 32 gm PB  Lunch: 3 oz hamburger and cheese, 4 oz potato, 6 oz broccoli, 6 oz cherries OR veggie toi (3 protein, 1 starch), 2 oz cheese, salad, grapes OR 4 cup salad, 1 oz sour cream, mixed fruit  Dinner: 4 oz brat, bun, 1 oz tortillas, veggies OR 2 oz potato, bun, hamburger, cheese and veggies OR 3/4 cup rice, 1 oz cheese, 4 oz meat, bun  Snacks: 6 oz yogurt and  "grapes OR 1 oz cheese and fruit  Beverages: Milk 0-1 cup/day and Water all day    Misses meals? none  Eats out:  seldom     Previous diet education:  Yes     Food allergies/intolerances: none    Diet is high in: protein  Diet is low in: fat (unsaturated)    EXERCISE: no regular exercise program- has tendonitis in foot so limited in movement.  Doing more bike riding.  Went to orthopedist and still having some pain/inflammation.     Yoga for meditative process.    SOCIO/ECONOMIC:   Lives with: spouse and children    MEDICATIONS:  Current Outpatient Medications   Medication     buPROPion (WELLBUTRIN SR) 150 MG 12 hr tablet     Cholecalciferol (VITAMIN D3 PO)     clindamycin (CLINDAGEL) 1 % external gel     levothyroxine (SYNTHROID/LEVOTHROID) 200 MCG tablet     metroNIDAZOLE (METROCREAM) 0.75 % external cream     sertraline (ZOLOFT) 50 MG tablet     Current Facility-Administered Medications   Medication     betamethasone acet & sod phos (CELESTONE) injection 6 mg     ropivacaine (NAROPIN) injection 2 mL       LABS:  Last Basic Metabolic Panel:  Lab Results   Component Value Date     10/03/2019      Lab Results   Component Value Date    POTASSIUM 4.8 10/03/2019     Lab Results   Component Value Date    CHLORIDE 105 10/03/2019     Lab Results   Component Value Date    AURY 8.8 10/03/2019     Lab Results   Component Value Date    CO2 25 10/03/2019     Lab Results   Component Value Date    BUN 16 10/03/2019     Lab Results   Component Value Date    CR 0.72 10/03/2019     Lab Results   Component Value Date    GLC 85 10/03/2019       ANTHROPOMETRICS:  Vitals: There were no vitals taken for this visit.  Estimated body mass index is 32.25 kg/m  as calculated from the following:    Height as of 8/6/21: 1.715 m (5' 7.5\").    Weight as of 8/6/21: 94.8 kg (209 lb).       Wt Readings from Last 5 Encounters:   08/06/21 94.8 kg (209 lb)   07/26/21 94.8 kg (209 lb)   04/26/21 97.6 kg (215 lb 3.2 oz)   10/03/19 102.1 kg (225 lb) "   08/31/18 105.9 kg (233 lb 6 oz)       Weight Change: No change in the past month    ESTIMATED KCAL REQUIREMENTS:  1990 kcal per day (based on last clinic weight from 8/6/21)    NUTRITION DIAGNOSIS: Overweight/Obesity related to prior excessive energy intake as evidenced by weight loss since working with OA and following a strict meal plan and BMI>30.    NUTRITION INTERVENTION:  Nutrition Prescription: See modifications to OA HOW plan (lowered protein and adjusted bedtime snack so smaller)  Education given to support: general nutrition guidelines, weight reduction, consistent meals, exercise, fiber, behavior modification, portion control and heart healthy diet  Motivational Interviewing    Discussion: Reviewed diet recall and noticed Keena is using cheese as her fat source for many meals, which is also adding some protein, and is getting more than 2 protein servings from protein bar, so will have her reduce protein first since seems in excess per diet discussion.  Will have her reduce protein with morning snack to 1 serving (1/2 protein bar or 16 gm peanut butter) and reduce protein at dinner from 4 oz down to 3 oz.  Keena is agreeable to these suggestions.  She also feels she is not very hungry before bed but would like to continue to eat a snack to prevent hunger at night.  Will lower her snack to 1/2 serving fruit and 1/2 serving dairy, down from 1 full serving each, and see how this works for her.    Answered Keena's questions on added sugar.  Recommendations are to limit <25 gm/day for females but no set guidelines on amount that may trigger a binge.  Suggested she limit the added sugars, if listed, to 4-5 gm a serving and see how this works for her.  She will aim for 4 gm but suggested 5 gm if hard to find food meeting this criteria.  If added sugar not listed, recommended reading ingredient list and avoid if top 3 ingredients.  Also reviewed activity and role in weight loss.  Keena's activity  has been limited due to food pain (tendonitis).  Revisited muscle building ideas such as yoga or Pilates if easier to include with her foot and to continue to bike/walk as able. Pt verbalized understanding of concepts discussed and recommendations provided.       PATIENT'S BEHAVIOR CHANGE GOALS:   See Patient Instructions for patient stated behavior change goals. AVS was sent by [facility].    MONITOR / EVALUATE:  RD will monitor/evaluate:  Food / Beverage / Nutrient intake   Pertinent Labs  Weight change    FOLLOW-UP:  Follow up with RD as needed.  Call RD with questions/concerns.     Moni Stephenson RDN, LD, [facility]   Time spent in minutes: 25 minutes  Encounter: Individual telephone visit

## 2021-09-09 NOTE — PATIENT INSTRUCTIONS
1. Changes to meal plan:    Breakfast  One protein  One starch  One fruit  One dairy  One fat     Morning snack  One protein (1/2 bar or 16 gm peanut butter) - down from 2 protein  One vegetable     Lunch  Three protein  One starch  Two veg  One fat  One fruit     Dinner  Three protein (down from 4)  1 and 1/2 starches  Two vegetable  Two fat     Bedtime snack  1/2 dairy (down from 1)  1/2 fruit (down from 1)      2. Limit added sugar to 4 gm per serving.    If this is not listed, look at ingredient list and avoid if sugar is one of the top 3 ingredients in the food.    3. Try Pilates to work on core strength and muscles while the foot is still healing (yoga also works well for this!)    FOLLOW UP: [facility] if above changes are not working and feeling hungry- can try modifying other foods.    Moni Stephenson RDN, LD, [facility]   [phone]

## 2021-09-25 ENCOUNTER — HEALTH MAINTENANCE LETTER (OUTPATIENT)
Age: 41
End: 2021-09-25

## 2021-10-02 ENCOUNTER — LAB (OUTPATIENT)
Dept: LAB | Facility: HOSPITAL | Age: 41
End: 2021-10-02
Payer: COMMERCIAL

## 2021-10-02 DIAGNOSIS — E89.0 POSTSURGICAL HYPOTHYROIDISM: Primary | ICD-10-CM

## 2021-10-02 LAB
T4 FREE SERPL-MCNC: 1.52 NG/DL (ref 0.7–1.8)
TSH SERPL DL<=0.005 MIU/L-ACNC: 0.02 UIU/ML (ref 0.3–5)

## 2021-10-02 PROCEDURE — 36415 COLL VENOUS BLD VENIPUNCTURE: CPT

## 2021-10-02 PROCEDURE — 84439 ASSAY OF FREE THYROXINE: CPT

## 2021-10-02 PROCEDURE — 84443 ASSAY THYROID STIM HORMONE: CPT

## 2021-10-17 ENCOUNTER — MYC MEDICAL ADVICE (OUTPATIENT)
Dept: NUTRITION | Facility: CLINIC | Age: 41
End: 2021-10-17

## 2021-10-18 NOTE — TELEPHONE ENCOUNTER
"Patient was recently seen for nutrition care for healthy weight loss on an OA meal plan.     The meal plan was fine tuned to reduce calories (by approx 300 across the day), now she complains of hunger and asks about adding calories (protein) back in:     We recently made some changes to my meal plan, but I'm pretty hungry most of the day. Could we add the protein back to my snack, maybe?  I'm open to other ideas, of course.    Rather than add in protein (add back ~ 100 calories), rec add a second helping of veggies or else add a fruit. May take from breakfast or just add 1 serving to add just 25-60 patel back in.     Reply:   Joss Brink,   Thanks for checking! Moni is out of office today but I want to get you taken care of.   I'm sorry to hear you're feeling hungry, it can take the body a little while to find its \"new normal\" when we make changes to the meal template.     I would not recommend adding the protein back at this point.   It looks like your weight had hit a plateau when you worked with Moni so she reduced calories a bit over the day.   If the hunger is a trigger, I would recommend adding in either another vegetable serving, or moving the fruit from breakfast to that morning snack.   Ideally, we're trying to create a \"deficit\" to get past the plateau so I hesitate to add calories back in.     Are you comfortable trying the veggies or fruit? That would be 2 veg at snack, or move the 1 breakfast fruit to snack.     Thanks! Cecy Walter RD, LD, [facility]    "

## 2021-11-11 DIAGNOSIS — F33.1 MAJOR DEPRESSIVE DISORDER, RECURRENT EPISODE, MODERATE (H): ICD-10-CM

## 2021-11-11 DIAGNOSIS — F41.1 GAD (GENERALIZED ANXIETY DISORDER): ICD-10-CM

## 2021-11-12 RX ORDER — BUPROPION HYDROCHLORIDE 150 MG/1
TABLET, EXTENDED RELEASE ORAL
Qty: 270 TABLET | Refills: 0 | Status: SHIPPED | OUTPATIENT
Start: 2021-11-12 | End: 2022-02-23

## 2021-11-12 NOTE — TELEPHONE ENCOUNTER
Routing refill request to provider for review/approval because:  PHQ-9 score less than 5 in past 6 months    [facility] Follow-up to PHQ 11/3/2020 1/19/2021 4/15/2021   PHQ-9 9. Suicide Ideation past 2 weeks Not at all Not at all Not at all     Joan Woo RN

## 2021-11-15 ENCOUNTER — TELEPHONE (OUTPATIENT)
Dept: FAMILY MEDICINE | Facility: CLINIC | Age: 41
End: 2021-11-15
Payer: COMMERCIAL

## 2021-11-15 NOTE — TELEPHONE ENCOUNTER
Medication was already sent to pharmacy        buPROPion (WELLBUTRIN SR) 150 MG 12 hr tablet 270 tablet       E-Prescribing Status: Receipt confirmed by pharmacy (11/12/2021 12:28 PM CST)        Saundra Angulo RN

## 2021-11-15 NOTE — TELEPHONE ENCOUNTER
[facility] faxed Refill Request for    busPIRone (BUSPAR) 15 MG tablet  DISCONTINUED        Last Written Prescription Date:  11/3/2020  Last Fill Quantity: 180 tablet,   # refills: 1  Last Office Visit: 7/26/2021 ALFREDO Jameson    Future Office visit:       Routing refill request to provider for review/approval because:  Drug not active on patient's medication list

## 2021-11-18 ENCOUNTER — VIRTUAL VISIT (OUTPATIENT)
Dept: FAMILY MEDICINE | Facility: CLINIC | Age: 41
End: 2021-11-18
Payer: COMMERCIAL

## 2021-11-18 DIAGNOSIS — F41.1 GAD (GENERALIZED ANXIETY DISORDER): ICD-10-CM

## 2021-11-18 DIAGNOSIS — F33.42 RECURRENT MAJOR DEPRESSIVE DISORDER, IN FULL REMISSION (H): Primary | ICD-10-CM

## 2021-11-18 PROCEDURE — 99213 OFFICE O/P EST LOW 20 MIN: CPT | Mod: 95 | Performed by: FAMILY MEDICINE

## 2021-11-18 ASSESSMENT — ANXIETY QUESTIONNAIRES
2. NOT BEING ABLE TO STOP OR CONTROL WORRYING: SEVERAL DAYS
7. FEELING AFRAID AS IF SOMETHING AWFUL MIGHT HAPPEN: SEVERAL DAYS
1. FEELING NERVOUS, ANXIOUS, OR ON EDGE: SEVERAL DAYS
6. BECOMING EASILY ANNOYED OR IRRITABLE: SEVERAL DAYS
5. BEING SO RESTLESS THAT IT IS HARD TO SIT STILL: SEVERAL DAYS
GAD7 TOTAL SCORE: 7
7. FEELING AFRAID AS IF SOMETHING AWFUL MIGHT HAPPEN: SEVERAL DAYS
GAD7 TOTAL SCORE: 7
3. WORRYING TOO MUCH ABOUT DIFFERENT THINGS: SEVERAL DAYS
8. IF YOU CHECKED OFF ANY PROBLEMS, HOW DIFFICULT HAVE THESE MADE IT FOR YOU TO DO YOUR WORK, TAKE CARE OF THINGS AT HOME, OR GET ALONG WITH OTHER PEOPLE?: SOMEWHAT DIFFICULT
GAD7 TOTAL SCORE: 7
4. TROUBLE RELAXING: SEVERAL DAYS

## 2021-11-18 NOTE — PROGRESS NOTES
Keena is a 41 year old who is being evaluated via a billable video visit.      How would you like to obtain your AVS? MyChart  If the video visit is dropped, the invitation should be resent by: Text to cell phone: [phone]  Will anyone else be joining your video visit? No      Video Start Time: 2:13 PM    Assessment & Plan     Recurrent major depressive disorder, in full remission (H)  The current medical regimen is effective;  continue present plan and medications.  She is doing very well with the addition of the sertraline 50 mg daily with the Wellbutrin 300 mg daily.  We will have her come in in 3 months for physical with fasting labs and Pap    DAYANARA (generalized anxiety disorder)  She is doing very well with the addition of sertraline          22 minutes spent on the date of the encounter doing chart review, review of test results, interpretation of tests, patient visit and documentation       No follow-ups on file.    Silvana Patton [facility]    Murray Brink is a 41 year old who presents for the following health issues     History of Present Illness       Mental Health Follow-up:  Patient presents to follow-up on Depression & Anxiety.Patient's depression since last visit has been:  No change  The patient is not having other symptoms associated with depression.  Patient's anxiety since last visit has been:  Better  The patient is having other symptoms associated with anxiety.  Any significant life events: other  Patient is feeling anxious or having panic attacks.  Patient has no concerns about alcohol or drug use.     Social History  Tobacco Use    Smoking status: Former Smoker      Packs/day: 0.00      Years: 0.00      Pack years: 0    Smokeless tobacco: Never Used    Tobacco comment: smoked from ages [age]  Vaping Use    Vaping Use: Never used  Alcohol use: No  Drug use: No      Today's PHQ-9         PHQ-9 Total Score:     (P) 4   PHQ-9 Q9 Thoughts of better off  dead/self-harm past 2 weeks :   (P) Not at all   Thoughts of suicide or self harm:      Self-harm Plan:        Self-harm Action:          Safety concerns for self or others:           She eats 4 or more servings of fruits and vegetables daily.She consumes 0 sweetened beverage(s) daily.She exercises with enough effort to increase her heart rate 9 or less minutes per day.  She exercises with enough effort to increase her heart rate 3 or less days per week. She is missing 1 dose(s) of medications per week.  She is not taking prescribed medications regularly due to remembering to take.       Depression and Anxiety Follow-Up    How are you doing with your depression since your last visit? No change    How are you doing with your anxiety since your last visit?  Improved     Are you having other symptoms that might be associated with depression or anxiety? Yes:  panic attacks    Have you had a significant life event? No     Do you have any concerns with your use of alcohol or other drugs? No    Social History     Tobacco Use     Smoking status: Former Smoker     Packs/day: 0.00     Years: 0.00     Pack years: 0.00     Smokeless tobacco: Never Used     Tobacco comment: smoked from ages [age]   Vaping Use     Vaping Use: Never used   Substance Use Topics     Alcohol use: No     Drug use: No     PHQ 1/19/2021 4/15/2021 11/18/2021   PHQ-9 Total Score - 2 4   Q9: Thoughts of better off dead/self-harm past 2 weeks Not at all Not at all Not at all     DAYANARA-7 SCORE 1/19/2021 4/15/2021 11/18/2021   Total Score - - -   Total Score - - 7 (mild anxiety)   Total Score 19 6 7     Last PHQ-9 11/18/2021   1.  Little interest or pleasure in doing things 1   2.  Feeling down, depressed, or hopeless 1   3.  Trouble falling or staying asleep, or sleeping too much 1   4.  Feeling tired or having little energy 0   5.  Poor appetite or overeating 0   6.  Feeling bad about yourself 1   7.  Trouble concentrating 0   8.  Moving slowly or restless 0    Q9: Thoughts of better off dead/self-harm past 2 weeks 0   PHQ-9 Total Score 4   Difficulty at work, home, or with people -     DAYANARA-7  11/18/2021   1. Feeling nervous, anxious, or on edge 1   2. Not being able to stop or control worrying 1   3. Worrying too much about different things 1   4. Trouble relaxing 1   5. Being so restless that it is hard to sit still 1   6. Becoming easily annoyed or irritable 1   7. Feeling afraid, as if something awful might happen 1   DAYANARA-7 Total Score 7   If you checked any problems, how difficult have they made it for you to do your work, take care of things at home, or get along with other people? -       Suicide Assessment Five-step Evaluation and Treatment (SAFE-T)    Review of Systems   Constitutional, HEENT, cardiovascular, pulmonary, gi and gu systems are negative, except as otherwise noted.      Objective           Vitals:  No vitals were obtained today due to virtual visit.    Physical Exam   GENERAL: Healthy, alert and no distress  EYES: Eyes grossly normal to inspection.  No discharge or erythema, or obvious scleral/conjunctival abnormalities.  RESP: No audible wheeze, cough, or visible cyanosis.  No visible retractions or increased work of breathing.    SKIN: Visible skin clear. No significant rash, abnormal pigmentation or lesions.  NEURO: Cranial nerves grossly intact.  Mentation and speech appropriate for age.  PSYCH: Mentation appears normal, affect normal/bright, judgement and insight intact, normal speech and appearance well-groomed.        Video-Visit Details    Type of service:  Video Visit    Video End Time:2:35 PM    Originating Location (pt. Location): Home    Distant Location (provider location):  [facility]     Platform used for Video Visit: [employer]

## 2021-11-19 ASSESSMENT — PATIENT HEALTH QUESTIONNAIRE - PHQ9: SUM OF ALL RESPONSES TO PHQ QUESTIONS 1-9: 4

## 2021-11-19 ASSESSMENT — ANXIETY QUESTIONNAIRES: GAD7 TOTAL SCORE: 7

## 2021-12-02 ENCOUNTER — OFFICE VISIT (OUTPATIENT)
Dept: ORTHOPEDICS | Facility: CLINIC | Age: 41
End: 2021-12-02
Payer: COMMERCIAL

## 2021-12-02 VITALS — HEIGHT: 68 IN | DIASTOLIC BLOOD PRESSURE: 70 MMHG | BODY MASS INDEX: 32.01 KG/M2 | SYSTOLIC BLOOD PRESSURE: 112 MMHG

## 2021-12-02 DIAGNOSIS — M76.822 TIBIALIS POSTERIOR TENDONITIS, LEFT: Primary | ICD-10-CM

## 2021-12-02 PROCEDURE — 99213 OFFICE O/P EST LOW 20 MIN: CPT | Performed by: FAMILY MEDICINE

## 2021-12-02 NOTE — LETTER
12/2/2021         RE: Keena Tobias  [address]  [place] [address]        Dear Colleague,    Thank you for referring your patient, Keena Tobias, to the [facility]. Please see a copy of my visit note below.    ASSESSMENT & PLAN    Keena was seen today for pain.    Diagnoses and all orders for this visit:    Tibialis posterior tendonitis, left  -     [facility] PT and Hand Referral; Future      This issue is acute on chronic and Worsening.    # Left Posterior Tibialis Tendon Dysfunction/Extensor hallicus longus pain: Had acute flare in 8/21 with improvement after steroid injection but has had this issue chronically since she was a child.  Reviewed x-ray showing no significant arthritis.  She also has hindfoot valgus likely contributing to her symptoms.  Given her symptoms not fully resolved since last visit plan to treat as below and follow-up if not improving in 1 month.   Image Findings: No arthritis on x-ray, previous ultrasound showing irritation of the posterior tibialis tendon  Treatment: Activities as tolerated, referral to physical therapy today  Job: No restrictions  Medications/Injections: Limited tylenol/ibuprofen for pain for 1-2 weeks, no injection today  Follow-up: In one month if symptoms do not improve, sooner if worsening    Adams More MD  [facility]    Ultrasound Findings: increased fluid over left posterior tibialis tendon     Please do not bill    -----  Chief Complaint   Patient presents with     Left Ankle - Pain       SUBJECTIVE  Keena Tobias is a/an 41 year old female who is seen in consultation at the request of  Silvana Patton M.D. for evaluation of left ankle.     The patient is seen by themselves.    Onset: Several month(s) ago. Reports insidious onset without acute precipitating event. States that she has had multiple twisting injuries.  Saw PCP 7/26/21 and  "left ankle xrays were performed.  Has HO multiple inversion injuries.  Affecting ability to walk.  Using brace frequently   Location of Pain: left medial ankle and medial heel   Worsened by: going up and down stairs, walking hills, walking, weight bearing   Better with: elevation, ice, ibuprofen   Treatments tried:  elevation, ice, ibuprofen, brace, rest  Associated symptoms: swelling    Orthopedic/Surgical history: YES - multiple sprains   Social History/Occupation:      Interim History - December 2, 2021  Since last visit on 8/6/2021 patient has slowly improved pain.  Notes that sharp stabbing pain has improved but states that she is having a constant aching pain  Posterior tibialis tendon injection provided good relief. No interim injury.  Pain over medial ankle wrapping ant/pos with walking.  She would like to be able walk to the park without pain.  She tried stretching and calf raises but didn't get much help.  Does use inserts that help.      No family history pertinent to patient's problem today.      REVIEW OF SYSTEMS:  Review of Systems  Constitutional, HEENT, cardiovascular, pulmonary, GI, , musculoskeletal, neuro, skin, endocrine and psych systems are negative, except as otherwise noted.    OBJECTIVE:  /70   Ht 1.721 m (5' 7.75\")   BMI 32.01 kg/m     General: healthy, alert and in no distress  HEENT: no scleral icterus or conjunctival erythema  Skin: no suspicious lesions or rash. No jaundice.  CV: distal perfusion intact    Resp: normal respiratory effort without conversational dyspnea   Psych: normal mood and affect  Gait: normal steady gait with appropriate coordination and balance    Neuro: Normal light sensory exam of left lower extremity     LEFT ANKLE  Inspection:  Hindfoot valgus, too many toes sign left > right  Palpation:    Tender about the posterior tibialis tenond. Remainder of bony and ligamentous landmarks are nontender.  Range of Motion:     Plantarflexion full / " dorsiflexion full / inversion full / eversion full  Strength:    full  Special Tests:    negative anterior drawer, negative talar tilt, negative valgus stress, negative forced external rotation/eversion, positive Schafer sign, negative squeeze test       RADIOLOGY:  I independently, visualized and reviewed these images with the patient     XR ANKLE LEFT G/E 3 VIEWS 7/26/2021 11:29 AM      HISTORY: Pain in joint involving ankle and foot, left                                                                      IMPRESSION: No apparent fracture. The ankle mortise appears congruent.  Plantar calcaneal spur is noted.     MITZI MARTINEZ MD     Review of external notes as documented elsewhere in note  Review of the result(s) of each unique test - left ankle x-ray             Again, thank you for allowing me to participate in the care of your patient.        Sincerely,        Adams More MD     Tele JARRET Kay

## 2021-12-10 ENCOUNTER — THERAPY VISIT (OUTPATIENT)
Dept: PHYSICAL THERAPY | Facility: CLINIC | Age: 41
End: 2021-12-10
Attending: FAMILY MEDICINE
Payer: COMMERCIAL

## 2021-12-10 DIAGNOSIS — M76.822 TIBIALIS POSTERIOR TENDONITIS, LEFT: Primary | ICD-10-CM

## 2021-12-10 PROCEDURE — 97530 THERAPEUTIC ACTIVITIES: CPT | Mod: GP | Performed by: PHYSICAL THERAPIST

## 2021-12-10 PROCEDURE — 97161 PT EVAL LOW COMPLEX 20 MIN: CPT | Mod: GP | Performed by: PHYSICAL THERAPIST

## 2021-12-10 ASSESSMENT — ACTIVITIES OF DAILY LIVING (ADL)
HIGHEST_POTENTIAL_ADL_SCORE:: 72
TOTAL_ADL_ITEM_SCORE:: 31

## 2021-12-10 NOTE — PROGRESS NOTES
Physical Therapy Initial Examination/Evaluation  December 10, 2021    Keena Tobias is a 41 year old female referred to physical therapy by Dr. Adams More for treatment of L tibialis posterior tendonitis with Precautions/Restrictions/MD instructions E&T    Therapist Impression:   Keena presents with significant pes planus that is leading to posterior tibialis dysfunction.  I would recommend custom orthotics based off of the swelling over the posterior tib tendon and severe pes planus.  I would also recommend compression stockings to assist with her swelling as well.  GOALS:     Subjective:   DOI/onset: 2021 DOS: none  Acute Injury or Gradual Onset?: Gradual injury over time  Mechanism of Injury: Accumulative stress   Related PMH: Hx of ankle sprains, R toe pain Previous Treatment: Corticosteroid injection and strengthening Effect of prior treatment: good  Imaging: x-ray  Chief Complaint/Functional Limitations:   Walking 10 min and see below in therapy evaluation codes   Pain: rest 0 /10, activity not reprts/10 Location: ankle joint Frequency: Intermittent Described as: dull Alleviated by: Rest and Corticosteroid injection Progression of Symptoms: Gradually getting better. Time of day when pain is worse: Activity related  Sleeping: Interrupted due to current issue   Occupation:   Job duties: prolonged sitting, keyboarding/computer use, prolonged standing  Current HEP/exercise regimen: Walking,   Patient's goals are see chief complaints     Other pertinent PMH/Red Flags: Graves Disease, Depression/Anxiety   Barriers at home/work: None as reported by patient  Pertinent Surgical History:  and appendectomy  Medications: Anti-depressants, Thyroid, anxiety   General health as reported by patient: good  Return to MD:  prn    ANKLE EVALUATION    Static Posture  Pes planus B    Dynamic Movement Screen  Single leg stance observations: Eyes open no significant findings   Double limb  squat observations: Good technique/no significant findings  Single limb squat observations: poor control and lacking depth  Gait: medial foot collapse    Ankle Range of Motion  Ankle AROM Dorsiflexion Plantarflexion Inversion Eversion WBing DF (cm)   Left wnl wnl wnl wnl na   Right wnl wnl wnl wnl na   **WBing DF- distance between wall and great toe where pt can touch knee to wall and keep heel in contact with floor    Ankle Strength  Ankle MMT Dorsiflexion Plantarflexion Inversion Eversion   Left 5/5 na/5 5/5 5/5   Right 5/5 na/5 5/5 5/5     Provocation tests  Resisted (tedon) Left Right  PF/INV (post tib) Pain-free Pain-free  Overpressure/Stretch (tendon sheath)  Post tib (DV/EV) Pain-free      Special Tests:  Ligament testing: negative  Hypermobile forefoot    Assessment/Plan:  Patient is a 41 year old female with left side ankle complaints.    Patient has the following significant findings with corresponding treatment plan.                Diagnosis 1:  L tibialis posterior tendonitis  Pain -  hot/cold therapy, manual therapy, splint/taping/bracing/orthotics, self management, education and home program  Decreased strength - therapeutic exercise and therapeutic activities  Impaired muscle performance - neuro re-education  Decreased function - therapeutic activities  Instability -  Therapeutic Activity  Therapeutic Exercise    Therapy Evaluation Codes:   Cumulative Therapy Evaluation is: Low complexity.    Previous and current functional limitations:  (See Goal Flow Sheet for this information)    Short term and Long term goals: (See Goal Flow Sheet for this information)     Communication ability:  Patient appears to be able to clearly communicate and understand verbal and written communication and follow directions correctly.  Treatment Explanation - The following has been discussed with the patient:   RX ordered/plan of care  Anticipated outcomes  Possible risks and side effects  This patient would benefit from PT  intervention to resume normal activities.   Rehab potential is good.    Frequency:  2 X a month, once daily  Duration:  for 2 months  Discharge Plan:  Achieve all LTG.  Independent in home treatment program.  Reach maximal therapeutic benefit.    Please refer to the daily flowsheet for treatment today, total treatment time and time spent performing 1:1 timed codes.

## 2021-12-14 ENCOUNTER — MYC MEDICAL ADVICE (OUTPATIENT)
Dept: ORTHOPEDICS | Facility: CLINIC | Age: 41
End: 2021-12-14
Payer: COMMERCIAL

## 2021-12-14 DIAGNOSIS — M25.579 PAIN IN JOINT INVOLVING ANKLE AND FOOT, UNSPECIFIED LATERALITY: ICD-10-CM

## 2021-12-14 DIAGNOSIS — M79.89 LEG SWELLING: Primary | ICD-10-CM

## 2021-12-30 ENCOUNTER — OFFICE VISIT (OUTPATIENT)
Dept: FAMILY MEDICINE | Facility: CLINIC | Age: 41
End: 2021-12-30
Payer: COMMERCIAL

## 2021-12-30 VITALS
TEMPERATURE: 98.6 F | HEART RATE: 94 BPM | HEIGHT: 67 IN | BODY MASS INDEX: 32.8 KG/M2 | RESPIRATION RATE: 16 BRPM | WEIGHT: 209 LBS | SYSTOLIC BLOOD PRESSURE: 125 MMHG | DIASTOLIC BLOOD PRESSURE: 78 MMHG

## 2021-12-30 DIAGNOSIS — M77.8 TENDINITIS OF THUMB: Primary | ICD-10-CM

## 2021-12-30 PROCEDURE — 99213 OFFICE O/P EST LOW 20 MIN: CPT | Performed by: FAMILY MEDICINE

## 2021-12-30 ASSESSMENT — MIFFLIN-ST. JEOR: SCORE: 1645.65

## 2021-12-30 NOTE — PROGRESS NOTES
"OFFICE VISIT - FAMILY MEDICINE     ASSESSMENT AND PLAN       ICD-10-CM    1. Tendinitis of thumb  M77.8 Wrist/Arm/Hand Supplies Order for DME - ONLY FOR DME   Left thumb pain, differential diagnosis discussed included tendinitis,Synovitis; management discussed with ice activity modification, anti-inflammatory as needed, she was given a thumb brace to wear during activities  for the next couple weeks, she will let us know if her symptoms fail to improve, could refer to occupational therapy.    CHIEF COMPLAINT   hurt right thumb       HPI   Keena Tobias is a 41 year old female.  No Patient Care Coordination Note on file.    She does have a chronic left thumb pain, few days ago her daughter did accidentally grabbed her left thumb and the pain got worse, with limited range of motion, has tried ice heat ibuprofen with no significant improvement.  No numbness tingling.  No significant swelling.  No direct trauma.      Review of Systems As per HPI, otherwise negative.    OBJECTIVE   /78 (BP Location: Left arm, Patient Position: Sitting, Cuff Size: Adult Regular)   Pulse 94   Temp 98.6  F (37  C) (Oral)   Resp 16   Ht 1.702 m (5' 7\")   Wt 94.8 kg (209 lb)   LMP 12/09/2021 (Approximate)   BMI 32.73 kg/m    Physical Exam  Constitutional:       Appearance: Normal appearance.   Musculoskeletal:        Hands:    Psychiatric:         Mood and Affect: Mood normal.         Judgment: Judgment normal.         PFSH     Family History   Problem Relation Age of Onset     C.A.D. Maternal Grandmother      Diabetes Maternal Grandmother      Circulatory Maternal Grandmother      Lipids Maternal Grandmother      Osteoporosis Maternal Grandmother      Breast Cancer Maternal Grandmother      C.A.D. Maternal Grandfather      Alcohol/Drug Maternal Grandfather      Depression Maternal Grandfather      C.A.D. Paternal Grandmother      Obesity Paternal Grandmother      Hypertension Mother      Neurologic Disorder Mother  "        migranes     Lipids Mother      Breast Cancer Mother      Prostate Cancer Paternal Grandfather      Alcohol/Drug Paternal Grandfather      Obesity Paternal Grandfather      Depression Father      Neurologic Disorder Father         MS     Musculoskeletal Disorder Father         MS     Obesity Father      Allergies Brother      Depression Sister      Depression Brother      Gastrointestinal Disease Brother      Obesity Sister      Breast Cancer Maternal Aunt      Eye Disorder No family hx of         self from graves disease, bulge and dryness     Thyroid Disease No family hx of         graves disease     Social History     Socioeconomic History     Marital status:      Spouse name: Not on file     Number of children: 2     Years of education: Not on file     Highest education level: Not on file   Occupational History     Occupation: student     Employer: NONE    Tobacco Use     Smoking status: Former Smoker     Packs/day: 0.00     Years: 0.00     Pack years: 0.00     Smokeless tobacco: Never Used     Tobacco comment: smoked from ages [age]   Vaping Use     Vaping Use: Never used   Substance and Sexual Activity     Alcohol use: No     Drug use: No     Sexual activity: Yes     Partners: Male     Comment: spouse is sterile   Other Topics Concern     Parent/sibling w/ CABG, MI or angioplasty before 65F 55M? Yes   Social History Narrative    Caffeine intake/servings daily - 0    Calcium intake/servings daily - 3    Exercise 7 times weekly - describe walks daily    Sunscreen used - Yes    Seatbelts used - No    Guns stored in the home - No    Self Breast Exam - Yes    Pap test up to date -  Yes    Eye exam up to date -  No    Dental exam up to date -  Yes    DEXA scan up to date -  No    Flex Sig/Colonoscopy up to date -  No    Mammography up to date -  No    Immunizations reviewed and up to date - Yes    Abuse: Current or Past (Physical, Sexual or Emotional) - No    Do you feel safe in your environment - Yes     Do you cope well with stress - Yes and No    Do you suffer from insomnia - No    Last updated by: Taylor Ramos  2014         Social Determinants of Health     Financial Resource Strain: Not on file   Food Insecurity: Not on file   Transportation Needs: Not on file   Physical Activity: Not on file   Stress: Not on file   Social Connections: Not on file   Intimate Partner Violence: Not on file   Housing Stability: Not on file       PMSH   [unfilled]  Past Surgical History:   Procedure Laterality Date     C/SECTION, LOW TRANSVERSE  11/3/10    , Low Transverse      SECTION N/A 2014    Procedure:  SECTION;  Surgeon: Rubina Birmingham MD;  Location:  L+D     [facility] APPENDECTOMY         RESULTS/CONSULTS (Lab/Rad)   No results found for this or any previous visit (from the past 168 hour(s)).  Cervical spine XR, 2-3 views  Narrative: CERVICAL SPINE TWO - THREE VIEWS  2021 11:48 AM     HISTORY: Pain, injury.    COMPARISON: None.  Impression: IMPRESSION: Mild reversal of the normal cervical lordosis centered at  C4-C5. Minimal levoconvex curvature centered in the upper thoracic  region. No gross vertebral body height loss. Multilevel degenerative  disc disease including moderate disc space narrowing C5-6 and C6-7  with marginal endplate osteophytes. The prevertebral soft tissues  appear normal.    KAILA BUSBY MD         SYSTEM ID:  [id number]  CT Facial Bones without Contrast  Narrative: CT SCAN OF THE FACE WITHOUT CONTRAST 2021 1:00 PM     HISTORY: Facial trauma. Frontal tooth pain, left-sided mouth  laceration.    TECHNIQUE: Axial CT images of the facial bones were completed with  sagittal and coronal reformations. Radiation dose for this scan was  reduced using automated exposure control, adjustment of the mA and/or  kV according to patient size, or iterative reconstruction technique.     COMPARISON: None.     FINDINGS: The pterygoid plates are intact.  The zygomatic arches,  sphenotemporal buttresses, the walls of both orbits, and the walls of  the maxillary sinuses appear intact. No displaced nasal arch or nasal  septal fracture identified. The anterior skull base is intact. The  mandible is intact. The temporomandibular joints are normally located.  No obvious fracture of the maxillary alveolus.    No intraorbital hematoma. The intraorbital soft tissues appear normal.  The paranasal sinuses are clear. The mastoid and middle ear cavities  are clear.    Visualized intracranial contents are normal.  Impression: IMPRESSION: No acute displaced maxillofacial fracture appreciated.    KAILA BUSBY MD         SYSTEM ID:  [id number]     [unfilled]    HEALTH MAINTENANCE / SCREENING   [unfilled], [unfilled],[unfilled]  Immunization History   Administered Date(s) Administered     COVID-19,PF,Pfizer (12+ Yrs) 03/30/2021, 04/27/2021     HepB-Adult 05/31/2001, 07/06/2001, 01/11/2002     Influenza (IIV3) PF 11/05/2008, 09/22/2010, 11/03/2011, 12/11/2012     Influenza Vaccine IM > 6 months Valent IIV4 (Alfuria,Fluzone) 10/10/2014, 11/09/2015, 09/02/2016, 10/05/2017, 11/07/2019, 11/30/2020     TDAP Vaccine (Adacel) 04/21/2011     TDAP Vaccine (Boostrix) 08/20/2014     Health Maintenance   Topic     ADVANCE CARE PLANNING      PREVENTIVE CARE VISIT      INFLUENZA VACCINE (1)     HPV TEST      LIPID      PAP      ANNUAL REVIEW OF  ORDERS      PHQ-9      TSH W/FREE T4 REFLEX      DTAP/TDAP/TD IMMUNIZATION (3 - Td or Tdap)     HEPATITIS C SCREENING      HIV SCREENING      DEPRESSION ACTION PLAN      COVID-19 Vaccine      Pneumococcal Vaccine: Pediatrics (0 to 5 Years) and At-Risk Patients (6 to 64 Years)      IPV IMMUNIZATION      MENINGITIS IMMUNIZATION      HEPATITIS B IMMUNIZATION      Review of external notes as documented elsewhere in note  20 minutes spent on the date of the encounter doing chart review, review of outside records, review of test results,  interpretation of tests, patient visit and documentation   {Provider  Link to [facility] Help Grid :[id number]}       Jessica Marx MD  [facility]   This transcription uses voice recognition software, which may contain typographical errors.

## 2021-12-31 NOTE — PATIENT INSTRUCTIONS
"Patient Education     Tendonitis and Tenosynovitis  What are tendonitis and tenosynovitis?  Tendons are strong cords of tissue that connect muscles to bones. When a tendon is inflamed, it's called tendonitis. It can happen to any tendon in the body. An inflamed tendon can cause swelling, pain, and discomfort.    Another problem called tenosynovitis is linked to tendonitis. This is the inflammation of the lining of the tendon sheath around a tendon. Often the sheath itself is inflamed, but both the sheath and the tendon can be inflamed at the same time.   Common types of these tendon problems include:    Lateral epicondylitis. This is most often known as tennis elbow. It causes pain to the side of the elbow and forearm, along the thumb side of the arm. The pain is caused by damage to the tendons that bend the wrist back and away from the palm.    Medial epicondylitis. This is most often known as golfer's or baseball elbow. It causes pain from the elbow to the wrist on the palm side of the forearm. The pain is caused by damage to the tendons that bend the wrist toward the palm.    Rotator cuff tendonitis. This is a shoulder disorder. It causes inflammation of the shoulder capsule and related tendons.    DeQuervain tenosynovitis. This is a common tenosynovitis disorder. It causes swelling in the tendon sheath of the tendons of the thumb.    Trigger finger or trigger thumb. This is a type of tenosynovitis. The tendon sheath becomes inflamed and thickened. This makes it hard to extend or flex the finger or thumb. The finger or thumb may lock or \"trigger\" suddenly.  What causes tendonitis and tenosynovitis?  The cause of tendonitis and tenosynovitis is often not known. They may be caused by strain, overuse, injury, or too much exercise. They may also be linked to a disease such as diabetes, rheumatoid arthritis, or infection.   What are the symptoms of tendonitis and tenosynovitis?  Symptoms may include:    Pain in the " tendon when moved    Swelling from fluid and inflammation    A grating feeling when moving the joint  The symptoms of tendonitis can seem like other health problems. Talk with your healthcare provider for a diagnosis.   How are tendonitis and tenosynovitis diagnosed?  Your healthcare provider will ask about your health history and give you a physical exam. You may have tests to check for other problems that may be causing your symptoms. The tests may include:     Joint aspiration. The healthcare provider uses a needle to take a small amount of fluid from the joint. The fluid is tested to check for gout or signs of an infection.    X-ray. A small amount of radiation is used to make an image. Tendons can t be seen on an X-ray, but they can show bone. This test can check for arthritis, calcifications, and other problems.  How are tendonitis and tenosynovitis treated?  Treatment may include:    Changing your activities    Icing the area to reduce inflammation and pain. To make a cold pack, put ice cubes in a plastic bag that seals at the top. Wrap the bag in a clean, thin towel or cloth.    Putting a splint on the area to limit movement    Steroid injections to reduce inflammation and pain    Nonsteroidal anti-inflammatory drugs (called NSAIDs) to reduce inflammation and pain    Antibiotics if due to infection    Surgery if other treatments don't work  Key points about tendonitis and tenosynovitis    Tendonitis is when a tendon is inflamed. It can cause swelling, pain, and discomfort.    Another problem called tenosynovitis is linked to tendonitis. This is the inflammation of the lining of the tendon sheath around a tendon.    Common types of tendon problems include rotator cuff tendonitis and trigger finger or trigger thumb.    Tendonitis can be caused by strain, overuse, injury, and too much exercise.    Treatment may include changing your activities, icing the area to reduce pain, and using a splint to limit  movement.    Next steps  Tips to help you get the most from a visit to your healthcare provider:     Know the reason for your visit and what you want to happen.    Before your visit, write down questions you want answered.    Bring someone with you to help you ask questions and remember what your provider tells you.    At the visit, write down the name of a new diagnosis, and any new medicines, treatments, or tests. Also write down any new instructions your provider gives you.    Know why a new medicine or treatment is prescribed, and how it will help you. Also know what the side effects are.    Ask if your condition can be treated in other ways.    Know why a test or procedure is recommended and what the results could mean.    Know what to expect if you do not take the medicine or have the test or procedure.    If you have a follow-up appointment, write down the date, time, and purpose for that visit.    Know how you can contact your provider if you have questions.  Disha last reviewed this educational content on 1/1/2021 2000-2021 [employer]. All rights reserved. This information is not intended as a substitute for professional medical care. Always follow your healthcare professional's instructions.

## 2022-01-15 ENCOUNTER — HEALTH MAINTENANCE LETTER (OUTPATIENT)
Age: 42
End: 2022-01-15

## 2022-02-22 RX ORDER — KETOCONAZOLE 20 MG/G
CREAM TOPICAL
COMMUNITY
Start: 2021-09-22 | End: 2022-05-27

## 2022-02-22 RX ORDER — DESONIDE 0.5 MG/G
CREAM TOPICAL
COMMUNITY
Start: 2021-03-17 | End: 2022-05-27

## 2022-02-22 RX ORDER — CLINDAMYCIN PHOSPHATE 10 UG/ML
LOTION TOPICAL
COMMUNITY
Start: 2021-09-10 | End: 2023-04-04

## 2022-02-22 RX ORDER — ADAPALENE GEL USP, 0.3% 3 MG/G
GEL TOPICAL AT BEDTIME
COMMUNITY
Start: 2021-07-29 | End: 2023-09-22 | Stop reason: ALTCHOICE

## 2022-02-22 RX ORDER — MULTIVITAMIN,THERAPEUTIC
1 TABLET ORAL DAILY
COMMUNITY
End: 2022-06-01

## 2022-02-23 ENCOUNTER — OFFICE VISIT (OUTPATIENT)
Dept: FAMILY MEDICINE | Facility: CLINIC | Age: 42
End: 2022-02-23
Payer: COMMERCIAL

## 2022-02-23 VITALS
DIASTOLIC BLOOD PRESSURE: 76 MMHG | HEIGHT: 67 IN | HEART RATE: 85 BPM | TEMPERATURE: 97.9 F | SYSTOLIC BLOOD PRESSURE: 110 MMHG | WEIGHT: 207 LBS | BODY MASS INDEX: 32.49 KG/M2

## 2022-02-23 DIAGNOSIS — E55.9 VITAMIN D DEFICIENCY: ICD-10-CM

## 2022-02-23 DIAGNOSIS — Z13.220 SCREENING FOR HYPERLIPIDEMIA: ICD-10-CM

## 2022-02-23 DIAGNOSIS — D50.0 IRON DEFICIENCY ANEMIA DUE TO CHRONIC BLOOD LOSS: ICD-10-CM

## 2022-02-23 DIAGNOSIS — Z00.00 ADULT GENERAL MEDICAL EXAM: Primary | ICD-10-CM

## 2022-02-23 DIAGNOSIS — F33.1 MAJOR DEPRESSIVE DISORDER, RECURRENT EPISODE, MODERATE (H): ICD-10-CM

## 2022-02-23 DIAGNOSIS — E89.0 POSTOPERATIVE HYPOTHYROIDISM: ICD-10-CM

## 2022-02-23 DIAGNOSIS — Z12.4 CERVICAL CANCER SCREENING: ICD-10-CM

## 2022-02-23 DIAGNOSIS — F33.42 RECURRENT MAJOR DEPRESSIVE DISORDER, IN FULL REMISSION (H): ICD-10-CM

## 2022-02-23 DIAGNOSIS — F41.1 GAD (GENERALIZED ANXIETY DISORDER): ICD-10-CM

## 2022-02-23 LAB
ALBUMIN SERPL-MCNC: 4 G/DL (ref 3.5–5)
ALP SERPL-CCNC: 59 U/L (ref 45–120)
ALT SERPL W P-5'-P-CCNC: 16 U/L (ref 0–45)
ANION GAP SERPL CALCULATED.3IONS-SCNC: 8 MMOL/L (ref 5–18)
AST SERPL W P-5'-P-CCNC: 18 U/L (ref 0–40)
BILIRUB SERPL-MCNC: 0.5 MG/DL (ref 0–1)
BUN SERPL-MCNC: 12 MG/DL (ref 8–22)
CALCIUM SERPL-MCNC: 9.6 MG/DL (ref 8.5–10.5)
CHLORIDE BLD-SCNC: 104 MMOL/L (ref 98–107)
CHOLEST SERPL-MCNC: 230 MG/DL
CO2 SERPL-SCNC: 28 MMOL/L (ref 22–31)
CREAT SERPL-MCNC: 0.82 MG/DL (ref 0.6–1.1)
ERYTHROCYTE [DISTWIDTH] IN BLOOD BY AUTOMATED COUNT: 12.9 % (ref 10–15)
FASTING STATUS PATIENT QL REPORTED: YES
FERRITIN SERPL-MCNC: 48 NG/ML (ref 10–130)
GFR SERPL CREATININE-BSD FRML MDRD: >90 ML/MIN/1.73M2
GLUCOSE BLD-MCNC: 91 MG/DL (ref 70–125)
HCT VFR BLD AUTO: 43.7 % (ref 35–47)
HDLC SERPL-MCNC: 69 MG/DL
HGB BLD-MCNC: 14.4 G/DL (ref 11.7–15.7)
IRON SATN MFR SERPL: 29 % (ref 15–46)
IRON SERPL-MCNC: 90 UG/DL (ref 35–180)
LDLC SERPL CALC-MCNC: 142 MG/DL
MCH RBC QN AUTO: 29.6 PG (ref 26.5–33)
MCHC RBC AUTO-ENTMCNC: 33 G/DL (ref 31.5–36.5)
MCV RBC AUTO: 90 FL (ref 78–100)
PLATELET # BLD AUTO: 282 10E3/UL (ref 150–450)
POTASSIUM BLD-SCNC: 4.6 MMOL/L (ref 3.5–5)
PROT SERPL-MCNC: 7.1 G/DL (ref 6–8)
RBC # BLD AUTO: 4.86 10E6/UL (ref 3.8–5.2)
SODIUM SERPL-SCNC: 140 MMOL/L (ref 136–145)
TIBC SERPL-MCNC: 309 UG/DL (ref 240–430)
TRIGL SERPL-MCNC: 97 MG/DL
WBC # BLD AUTO: 4.6 10E3/UL (ref 4–11)

## 2022-02-23 PROCEDURE — 82306 VITAMIN D 25 HYDROXY: CPT | Performed by: FAMILY MEDICINE

## 2022-02-23 PROCEDURE — 99396 PREV VISIT EST AGE 40-64: CPT | Performed by: FAMILY MEDICINE

## 2022-02-23 PROCEDURE — 83550 IRON BINDING TEST: CPT | Performed by: FAMILY MEDICINE

## 2022-02-23 PROCEDURE — 85027 COMPLETE CBC AUTOMATED: CPT | Performed by: FAMILY MEDICINE

## 2022-02-23 PROCEDURE — 80061 LIPID PANEL: CPT | Performed by: FAMILY MEDICINE

## 2022-02-23 PROCEDURE — G0123 SCREEN CERV/VAG THIN LAYER: HCPCS | Performed by: FAMILY MEDICINE

## 2022-02-23 PROCEDURE — 82728 ASSAY OF FERRITIN: CPT | Performed by: FAMILY MEDICINE

## 2022-02-23 PROCEDURE — 80053 COMPREHEN METABOLIC PANEL: CPT | Performed by: FAMILY MEDICINE

## 2022-02-23 PROCEDURE — 36415 COLL VENOUS BLD VENIPUNCTURE: CPT | Performed by: FAMILY MEDICINE

## 2022-02-23 PROCEDURE — 87624 HPV HI-RISK TYP POOLED RSLT: CPT | Performed by: FAMILY MEDICINE

## 2022-02-23 RX ORDER — BUPROPION HYDROCHLORIDE 150 MG/1
TABLET, EXTENDED RELEASE ORAL
Qty: 270 TABLET | Refills: 3 | Status: SHIPPED | OUTPATIENT
Start: 2022-02-23 | End: 2022-06-10

## 2022-02-23 NOTE — PROGRESS NOTES
SUBJECTIVE:   CC: Keena Tobias is an 42 year old woman who presents for preventive health visit.       Patient has been advised of split billing requirements and indicates understanding: Yes  Needs med refills -   Gets Synthroid from endocrinologist - 2004 -   Graves disease - had radiation    Takes Vitamin D daily - 2000 units daily     Takes iron with period -   Once a week without period; 1-2 /day with period     Mom had breast cancer in her 70s   Patient had screening at 40 - gets 3D mammogram  Mom had genetic screening - mom's mom and aunt had breast cancer      Healthy Habits:     Getting at least 3 servings of Calcium per day:  Yes    Bi-annual eye exam:  NO    Dental care twice a year:  Yes    Sleep apnea or symptoms of sleep apnea:  None    Diet:  Other    Frequency of exercise:  2-3 days/week    Duration of exercise:  Less than 15 minutes    Taking medications regularly:  Yes    Medication side effects:  None    PHQ-2 Total Score: 2    Additional concerns today:  No      Today's PHQ-2 Score:   PHQ-2 ( 1999 Pfizer) 2/23/2022   Q1: Little interest or pleasure in doing things 1   Q2: Feeling down, depressed or hopeless 1   PHQ-2 Score 2   Q1: Little interest or pleasure in doing things Several days   Q2: Feeling down, depressed or hopeless Several days   PHQ-2 Score 2       Abuse: Current or Past (Physical, Sexual or Emotional) - No  Do you feel safe in your environment? Yes    Have you ever done Advance Care Planning? (For example, a Health Directive, POLST, or a discussion with a medical provider or your loved ones about your wishes): Yes, patient states has an Advance Care Planning document and will bring a copy to the clinic.    Social History     Tobacco Use     Smoking status: Former Smoker     Packs/day: 0.00     Years: 0.00     Pack years: 0.00     Smokeless tobacco: Never Used     Tobacco comment: smoked from ages [age]   Substance Use Topics     Alcohol use: No         Alcohol Use  2022   Prescreen: >3 drinks/day or >7 drinks/week? No   Prescreen: >3 drinks/day or >7 drinks/week? -       Reviewed orders with patient.  Reviewed health maintenance and updated orders accordingly - Yes  BP Readings from Last 3 Encounters:   22 110/76   21 125/78   21 112/70    Wt Readings from Last 3 Encounters:   22 93.9 kg (207 lb)   21 94.8 kg (209 lb)   21 94.8 kg (209 lb)                  Patient Active Problem List   Diagnosis     Varicose veins of legs     CARDIOVASCULAR SCREENING; LDL GOAL LESS THAN 160     Moderate major depression (H)     Graves disease     Compulsive overeating     Palpitations     Acne     Postoperative hypothyroidism     DAYANARA (generalized anxiety disorder)     Tibialis posterior tendonitis, left     Allergic rhinitis     Anxiety     Migraine variant     Past Surgical History:   Procedure Laterality Date     C/SECTION, LOW TRANSVERSE  11/3/10    , Low Transverse      SECTION N/A 2014    Procedure:  SECTION;  Surgeon: Rubina Birmingham MD;  Location: [facility] APPENDECTOMY         Social History     Tobacco Use     Smoking status: Former Smoker     Packs/day: 0.00     Years: 0.00     Pack years: 0.00     Smokeless tobacco: Never Used     Tobacco comment: smoked from ages [age]   Substance Use Topics     Alcohol use: No     Family History   Problem Relation Age of Onset     C.A.D. Maternal Grandmother      Diabetes Maternal Grandmother      Circulatory Maternal Grandmother      Lipids Maternal Grandmother      Osteoporosis Maternal Grandmother      Breast Cancer Maternal Grandmother      C.A.D. Maternal Grandfather      Alcohol/Drug Maternal Grandfather      Depression Maternal Grandfather      C.A.D. Paternal Grandmother      Obesity Paternal Grandmother      Hypertension Mother      Neurologic Disorder Mother         migranes     Lipids Mother      Breast Cancer Mother      Prostate Cancer Paternal  Grandfather      Alcohol/Drug Paternal Grandfather      Obesity Paternal Grandfather      Depression Father      Neurologic Disorder Father         MS     Musculoskeletal Disorder Father         MS     Obesity Father      Allergies Brother      Depression Sister      Depression Brother      Gastrointestinal Disease Brother      Obesity Sister      Breast Cancer Maternal Aunt      Eye Disorder No family hx of         self from graves disease, bulge and dryness     Thyroid Disease No family hx of         graves disease         Current Outpatient Medications   Medication Sig Dispense Refill     adapalene (DIFFERIN) 0.3 % external gel APPLY A PEA SIZE AOUNT TO ENTIRE FACE EVERY OTHER NIGHT INCREASING TO NIGHTLY AS TOLERATED. FOLLOW WITH MOISTURIZER       buPROPion (WELLBUTRIN SR) 150 MG 12 hr tablet TAKE TWO TABLETS BY MOUTH EVERY MORNING AND TAKE ONE TABLET BY MOUTH EVERY EVENING Needs follow-up 270 tablet 0     Cholecalciferol (VITAMIN D3 PO) Take 2,000 Units by mouth daily       clindamycin (CLEOCIN T) 1 % external lotion APPLY THIN LAYER TOPICALLY TO FACE EVERY MORNING AS NEEDED FOR FLARES       clindamycin (CLINDAGEL) 1 % external gel        desonide (DESOWEN) 0.05 % external cream        ketoconazole (NIZORAL) 2 % external cream APPLY A THIN LAYER TO AFFECTED AREA BEHIND EARS 1-2X DAILY       levothyroxine (SYNTHROID/LEVOTHROID) 200 MCG tablet Take 200 mcg by mouth daily       metroNIDAZOLE (METROCREAM) 0.75 % external cream        Multiple Vitamins-Minerals (ONCOVITE) TABS Take 1 tablet by mouth daily       sertraline (ZOLOFT) 50 MG tablet TAKE 1 TABLET (50 MG) BY MOUTH DAILY 30 tablet 0     Allergies   Allergen Reactions     Allegra [Fexofenadine Hydrochloride]      Heart palpitations? sensitivity       Breast Cancer Screening:    FHS-7:   Breast CA Risk Assessment (FHS-7) 2/23/2022   Did any of your first-degree relatives have breast or ovarian cancer? Yes   Did any of your relatives have bilateral breast  cancer? No   Did any man in your family have breast cancer? No   Did any woman in your family have breast and ovarian cancer? Yes   Did any woman in your family have breast cancer before age 50 y? No   Do you have 2 or more relatives with breast and/or ovarian cancer? Yes   Do you have 2 or more relatives with breast and/or bowel cancer? Yes       Mammogram Screening - Offered annual screening and updated Health Maintenance for mutual plan based on risk factor consideration    Pertinent mammograms are reviewed under the imaging tab.    History of abnormal Pap smear: NO - age 30-65 PAP every 5 years with negative HPV co-testing recommended  PAP / HPV Latest Ref Rng & Units 2012   PAP (Historical) - NIL NIL NIL   HPV16 NEG Negative - -   HPV18 NEG Negative - -   HRHPV NEG Negative - -     Reviewed and updated as needed this visit by clinical staff   Tobacco  Allergies  Meds              Reviewed and updated as needed this visit by Provider                 Past Medical History:   Diagnosis Date     Major depressive disorder, recurrent, mild (H) 2008    on wellbutrin     Thyrotoxicosis without mention of goiter or other cause, without mention of thyrotoxic crisis or storm     [place] - Dr. Sanchez      Past Surgical History:   Procedure Laterality Date     C/SECTION, LOW TRANSVERSE  11/3/10    , Low Transverse      SECTION N/A 2014    Procedure:  SECTION;  Surgeon: Rubina Birmingham MD;  Location: [facility] APPENDECTOMY       OB History    Para Term  AB Living   4 2 1 1 2 3   SAB IAB Ectopic Multiple Live Births   2 0 0 1 3      # Outcome Date GA Lbr Sergio/2nd Weight Sex Delivery Anes PTL Lv   4 Term 14 40w2d  3.714 kg (8 lb 3 oz) F CS-LTranv Spinal  FERNANDA      Apgar1: 9  Apgar5: 9   3A  11/03/10 36w1d  2.722 kg (6 lb) M -SEC   FERNANDA      Birth Comments: PPROM      Name: Tanner   3B  11/03/10 36w1d  2.41 kg (5  "lb 5 oz) F -SEC   FERNANDA      Birth Comments: breech      Name: Virginia Ramírez SAB 10/12/09     SAB      1 SAB 09 10w0d             Obstetric Comments   #3 from sperm donor with IUI       Review of Systems   Constitutional: Negative for activity change, appetite change, chills, fatigue, fever and unexpected weight change.   Endocrine:        Hypothyroid - follows with endocrine     Psychiatric/Behavioral:        Depression/anxiety     All other systems reviewed and are negative.         OBJECTIVE:   /76 (BP Location: Left arm, Patient Position: Sitting, Cuff Size: Adult Large)   Pulse 85   Temp 97.9  F (36.6  C) (Oral)   Ht 1.705 m (5' 7.13\")   Wt 93.9 kg (207 lb)   BMI 32.30 kg/m    Physical Exam  Constitutional:       General: She is not in acute distress.     Appearance: She is well-developed.   HENT:      Right Ear: Tympanic membrane and external ear normal.      Left Ear: Tympanic membrane and external ear normal.      Nose: Nose normal.      Mouth/Throat:      Pharynx: No oropharyngeal exudate.   Eyes:      General:         Right eye: No discharge.         Left eye: No discharge.      Conjunctiva/sclera: Conjunctivae normal.      Pupils: Pupils are equal, round, and reactive to light.   Neck:      Thyroid: No thyromegaly.      Trachea: No tracheal deviation.   Cardiovascular:      Rate and Rhythm: Normal rate and regular rhythm.      Pulses: Normal pulses.      Heart sounds: Normal heart sounds, S1 normal and S2 normal. No murmur heard.    No friction rub. No S3 or S4 sounds.   Pulmonary:      Effort: Pulmonary effort is normal. No respiratory distress.      Breath sounds: Normal breath sounds. No wheezing or rales.   Chest:   Breasts:      Right: No mass, nipple discharge or tenderness.      Left: No mass, nipple discharge or tenderness.       Abdominal:      General: Bowel sounds are normal.      Palpations: Abdomen is soft. There is no mass.      Tenderness: There is no abdominal tenderness. "   Genitourinary:     Comments: PELVIC EXAM:External genitalia: normal  Vaginal mucosa normal  Vaginal discharge:minimal  Speculum exam shows a normal appearing cervix .   Bimanual exam: Cervix closed, firm, non tender  to motion.  Uterus  firm, regular, mobile, non tender to palpation. No adnexal masses or tenderness.     Musculoskeletal:         General: Normal range of motion.      Cervical back: Neck supple.   Lymphadenopathy:      Cervical: No cervical adenopathy.   Skin:     General: Skin is warm and dry.      Findings: No rash.   Neurological:      Mental Status: She is alert and oriented to person, place, and time.      Motor: No abnormal muscle tone.      Deep Tendon Reflexes: Reflexes are normal and symmetric.   Psychiatric:         Thought Content: Thought content normal.         Judgment: Judgment normal.           Diagnostic Test Results:  Labs reviewed in Epic  Results for orders placed or performed in visit on 02/23/22   Iron and iron binding capacity     Status: Normal   Result Value Ref Range    Iron 90 35 - 180 ug/dL    Iron Binding Capacity 309 240 - 430 ug/dL    Iron Sat Index 29 15 - 46 %   Ferritin     Status: Normal   Result Value Ref Range    Ferritin 48 10 - 130 ng/mL   CBC with platelets     Status: Normal   Result Value Ref Range    WBC Count 4.6 4.0 - 11.0 10e3/uL    RBC Count 4.86 3.80 - 5.20 10e6/uL    Hemoglobin 14.4 11.7 - 15.7 g/dL    Hematocrit 43.7 35.0 - 47.0 %    MCV 90 78 - 100 fL    MCH 29.6 26.5 - 33.0 pg    MCHC 33.0 31.5 - 36.5 g/dL    RDW 12.9 10.0 - 15.0 %    Platelet Count 282 150 - 450 10e3/uL   Lipid Profile (Chol, Trig, HDL, LDL calc)     Status: Abnormal   Result Value Ref Range    Cholesterol 230 (H) <=199 mg/dL    Triglycerides 97 <=149 mg/dL    Direct Measure HDL 69 >=50 mg/dL    LDL Cholesterol Calculated 142 (H) <=129 mg/dL    Patient Fasting > 8hrs? Yes    Comprehensive metabolic panel (BMP + Alb, Alk Phos, ALT, AST, Total. Bili, TP)     Status: Normal   Result  Value Ref Range    Sodium 140 136 - 145 mmol/L    Potassium 4.6 3.5 - 5.0 mmol/L    Chloride 104 98 - 107 mmol/L    Carbon Dioxide (CO2) 28 22 - 31 mmol/L    Anion Gap 8 5 - 18 mmol/L    Urea Nitrogen 12 8 - 22 mg/dL    Creatinine 0.82 0.60 - 1.10 mg/dL    Calcium 9.6 8.5 - 10.5 mg/dL    Glucose 91 70 - 125 mg/dL    Alkaline Phosphatase 59 45 - 120 U/L    AST 18 0 - 40 U/L    ALT 16 0 - 45 U/L    Protein Total 7.1 6.0 - 8.0 g/dL    Albumin 4.0 3.5 - 5.0 g/dL    Bilirubin Total 0.5 0.0 - 1.0 mg/dL    GFR Estimate >90 >60 mL/min/1.73m2   Vitamin D Deficiency     Status: Abnormal   Result Value Ref Range    Vitamin D, Total (25-Hydroxy) 27 (L) 30 - 80 ug/L    Narrative    Deficiency <10.0 ug/L  Insufficiency 10.0-29.9 ug/L  Sufficiency 30.0-80.0 ug/L  Toxicity (possible) >100.0 ug/L    HPV High Risk Types DNA Cervical     Status: None   Result Value Ref Range    Other HR HPV Negative Negative    HPV16 DNA Negative Negative    HPV18 DNA Negative Negative    FINAL DIAGNOSIS       This patient's sample is negative for HPV DNA.        This test was developed and its performance characteristics determined by the [facility], [facility]. It has not been cleared or approved by the FDA. The laboratory is regulated under CLIA as qualified to perform high-complexity testing. This test is used for clinical purposes. It should not be regarded as investigational or for research.    METHODOLOGY: The Roche Kelly 4800 system uses automated extraction, simultaneous amplification of HPV (L1 region) and beta-globin, followed by real time detection of fluorescent labeled HPV and beta globin using specific oligonucleotide probes. The test specifically identified types HPV 16 DNA and HPV 18 DNA while concurrently detecting the rest of the high risk types (31, 33, 35, 39, 45, 51, 52, 56, 58, 59, 66 or 68).    COMMENTS: This test is not intended for use as a screening device for woman under age  30 with normal cervical cytology. Results should be correlated with cytologic and histologic findings. Close clinical followup is recommended.       Pap Screen with HPV - recommended age 30 - 65 years     Status: None   Result Value Ref Range    Interpretation        Negative for Intraepithelial Lesion or Malignancy (NILM)    Comment         Papanicolaou Test Limitations:  Cervical cytology is a screening test with limited sensitivity, and regular screening is critical for cancer prevention.  Pap tests are primarily effective for the diagnosis/prevention of squamous cell carcinoma, not adenocarcinoma or other cancers.        Specimen Adequacy       Satisfactory for evaluation, endocervical/transformation zone component present    Clinical Information       none      Reflex Testing Yes regardless of result     Previous Abnormal?       No      Performing Labs       The technical component of this testing was completed at [facility]         ASSESSMENT/PLAN:   1. Adult general medical exam  This is a 43 yo female here for physical exam - HM reviewed    2. Iron deficiency anemia due to chronic blood loss  H/o iron deficiency - check labs - determine need for iron   - Iron and iron binding capacity; Future  - Ferritin; Future  - CBC with platelets; Future  - Iron and iron binding capacity  - Ferritin  - CBC with platelets    3. Major depressive disorder, recurrent episode, moderate (H)  On medication for depression - stable   PHQ 1/19/2021 4/15/2021 11/18/2021   PHQ-9 Total Score - 2 4   Q9: Thoughts of better off dead/self-harm past 2 weeks Not at all Not at all Not at all       - buPROPion (WELLBUTRIN SR) 150 MG 12 hr tablet; TAKE TWO TABLETS BY MOUTH EVERY MORNING AND TAKE ONE TABLET BY MOUTH EVERY EVENING Needs follow-up  Dispense: 270 tablet; Refill: 3  - Comprehensive metabolic panel (BMP + Alb, Alk Phos, ALT, AST, Total. Bili, TP); Future  - Comprehensive metabolic panel (BMP + Alb,  "Alk Phos, ALT, AST, Total. Bili, TP)    4. DAYANARA (generalized anxiety disorder)  As above - depression with anxiety symptoms -   DAYANARA-7 SCORE 1/19/2021 4/15/2021 11/18/2021   Total Score - - -   Total Score - - 7 (mild anxiety)   Total Score 19 6 7        sertraline (ZOLOFT) 50 MG tablet; Take 1 tablet (50 mg) by mouth daily  Dispense: 90 tablet; Refill: 3    5. Recurrent major depressive disorder, in full remission (H)  As above     6. Screening for hyperlipidemia  Due for lipid screening   - Lipid Profile (Chol, Trig, HDL, LDL calc); Future  - Lipid Profile (Chol, Trig, HDL, LDL calc)    7. Cervical cancer screening  Due for pap smear - pap/HPV done/sent  - Pap Screen with HPV - recommended age 30 - 65 years  - HPV High Risk Types DNA Cervical    8. Postoperative hypothyroidism  Has had previous thyroidectomy - follows with endocrine - labs up to date    9. Vitamin D deficiency  H/o Vitamin D deficiency -   - Vitamin D Deficiency; Future  - Vitamin D Deficiency      Patient has been advised of split billing requirements and indicates understanding: Yes    COUNSELING:  Reviewed preventive health counseling, as reflected in patient instructions       Regular exercise       Healthy diet/nutrition    Estimated body mass index is 32.3 kg/m  as calculated from the following:    Height as of this encounter: 1.705 m (5' 7.13\").    Weight as of this encounter: 93.9 kg (207 lb).        She reports that she has quit smoking. She smoked 0.00 packs per day for 0.00 years. She has never used smokeless tobacco.      Counseling Resources:  ATP IV Guidelines  Pooled Cohorts Equation Calculator  Breast Cancer Risk Calculator  BRCA-Related Cancer Risk Assessment: FHS-7 Tool  FRAX Risk Assessment  ICSI Preventive Guidelines  Dietary Guidelines for Americans, 2010  [employer]'s MyPlate  ASA Prophylaxis  Lung CA Screening    WANDY SHEIKH MD  [facility]  "

## 2022-02-24 LAB — DEPRECATED CALCIDIOL+CALCIFEROL SERPL-MC: 27 UG/L (ref 30–80)

## 2022-02-25 LAB
HUMAN PAPILLOMA VIRUS 16 DNA: NEGATIVE
HUMAN PAPILLOMA VIRUS 18 DNA: NEGATIVE
HUMAN PAPILLOMA VIRUS FINAL DIAGNOSIS: NORMAL
HUMAN PAPILLOMA VIRUS OTHER HR: NEGATIVE

## 2022-03-01 LAB
BKR LAB AP GYN ADEQUACY: NORMAL
BKR LAB AP GYN INTERPRETATION: NORMAL
BKR LAB AP HPV REFLEX: NORMAL
BKR LAB AP PREVIOUS ABNORMAL: NORMAL
PATH REPORT.COMMENTS IMP SPEC: NORMAL
PATH REPORT.COMMENTS IMP SPEC: NORMAL
PATH REPORT.RELEVANT HX SPEC: NORMAL

## 2022-03-06 ASSESSMENT — ENCOUNTER SYMPTOMS
APPETITE CHANGE: 0
FATIGUE: 0
ACTIVITY CHANGE: 0
CHILLS: 0
UNEXPECTED WEIGHT CHANGE: 0
FEVER: 0

## 2022-03-11 DIAGNOSIS — G25.0 ESSENTIAL TREMOR: Primary | ICD-10-CM

## 2022-03-15 NOTE — TELEPHONE ENCOUNTER
RECORDS RECEIVED FROM: Internal   REASON FOR VISIT: Essential tremor   Date of Appt: 5/13/22   NOTES (FOR ALL VISITS) STATUS DETAILS   OFFICE NOTE from referring provider Internal Dr America Garcia @ [facility] PCP:  3/10/22 mychart encounter   MEDICATION LIST Internal    IMAGING  (FOR ALL VISITS)     MRI (HEAD, NECK, SPINE) N/A    CT (HEAD, NECK, SPINE) N/A       NO IMAGES

## 2022-04-29 PROBLEM — R25.1 TREMOR: Status: ACTIVE | Noted: 2022-04-29

## 2022-04-29 NOTE — PROGRESS NOTES
"Diagnosis/Summary/Recommendations:    PATIENT: Keena Packer  42 year old female     : 1980    JIMMY: May 13, 2022    MRN: [id number]    [address]   [facility] [address]     [phone] (M)   [phone] (H)     [email or web address]  WALTER PACKER SPOUSE  [phone]    Assessment:  (R25.1) Tremor  (primary encounter diagnosis)  No family history of tremor   Present since middle school    carlos  Father had ms - passed away at 72 years. - diagnosed ; born in 1942 -   Has problems using utensils  Has problems with coffee - using two hands  Using two hands for lipstick  Using two hands with spoons  Has problems with board games  Crafter  Embroidery is hard  Knitting is hard  Juni is easier    Right handed  Equally affected to slightly worse on the right side    Caffeine affects her tremor - only drinking 8 ounces  When does not have a good night sleep has problems with more or worse tremor     Review of diagnosis    Tremor     Avoidance of dopamine blockers   Not taking    Motor complication review   no    Review of Impulse control disorders       Review of surgical or medication options   reviewed    Gait/Balance/Falls   \"weak ankles\" - tippy    Exercise/Therapy performed/offered   Walks every few days   Lives in [place] and works at [employer] school in [place]    Cognitive/Driving   No problems with driving  Thinking is fine.     Mood   Anxiety disorder  Depression  Compulsive overeating - in recovery for this - over eaters anonymous   Sertraline was started recently -started recently last two years  Had been on buspirone which stopped working and had been on it since   Bupropion was started in the past -  or thereabouts  No psychiatrist  No counsellor  group therapy = weekly   Speaks to sponsor daily     Walter spouse is a nurse at [employer] - works in home infusion  At the hospital teaching them     Hallucinations/delusions   denies    Sleep "   10/1130pm - falls asleep right away  May snore if on her back  Talks in her sleep - l jose long  Wakes up at 635a and tired when wakes up  Rare nocturia     Bladder/Renal/Prostate/Gyn/Other  Some NELI - not signicant    GI/Constipation/GERD   denies    ENDO/Lipid/DM/Bone density/Thyroid  Graves disease  Thyroid disorder  Synthroid 175/200  Franky mccurdy endocrinologist    Cardio/heart/Hyper or Hypotensive   Varicose veins  No blood pressure or fainting issues    Vision/Dry Eyes/Cataracts/Glaucoma/Macular   Wears glasses    Heme/Anticoagulation/Antiplatelet/Anemia/Other  Occasional anemia  May take MVI    ENT/Resp  Allergic rhinitiss    Skin/Cancer/Seborrhea/other  acne    Musculoskeletal/Pain/Headache  Tibialis posterior tendonitis, left  Migraine variant - 1 per year    Other:      Medications           Adapelene differin 0.3% gel       Bupropion wellbutrin SR 150mg 12 hr  2  1    Cholecalciferol Vitamin d3 50mcg 2000 units 1      Clindamycin cleocin  1% l otion       Clindamycin clindagel 1% gel       Desonide desowen 0.05% cream       Ketoconazole nizoral 2% cream       Levothyroxine synthroid/levothyroid 200mcg QOD      Levothyroxine synthroid/levothyroid 175mcg QOD      Metronidazole metrocream 0.75% cream       MVI variable      Sertraline zoloft 50mg  1      Vitamin D3 cholecalciferol 50mcg 2000 units above                                                                Postural tremor and has moderate right >left tremor with action as well  There is no bradykinesia or rigidity and she has normal arm swing and postural stability.       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  2 hours twice a week and will become 1 hour 4 times per week     Discussed management of tremor  Beta blocker - as needed and daily    Primidone/mysoline    Topiramate/topamax    Wearables, weighted  utensils, wrist weights    Focused ultrasound, gamma knife, deep brain stimulation    PLAN  Trial of propranolol 10mg as needed    Visit with Savannah Alexander or colleague    Return visit with Clarisa Anderson NP - tetras scale, etc. And review of management.    Granted proxy access to elainat to her spouse WALTER  Mother is a NP and worked in neurosurgery - now retired.  [facility] system         Coding statement:   Medical Decision Making:  #  Chronic progressive medical conditions addressed  - see above --   Review and/or interpretation of unique test or documentation from a provider outside of neurology yes   Independent historian provided additional details  Yes  - Walter  Prescription drug management and review of potential side effects and/or monitoring for side effects  -- see above ---  Health impacted by social determinants of health  no    I have reviewed the note as documented above.  This accurately captures the substance of my conversation with the patient and total time spent preparing for visit, executing visit and completing visit on the day of the visit:  45 minutes.  The portion of this total time included face to face time 840am - 925am    Cristobal Lyn MD     ______________________________________    Last visit date and details:             ______________________________________      Patient was asked about 14 Review of systems including changes in vision (dry eyes, double vision), hearing, heart, lungs, musculoskeletal, depression, anxiety, snoring, RBD, insomnia, urinary frequency, urinary urgency, constipation, swallowing problems, hematological, ID, allergies, skin problems: seborrhea, endocrinological: thyroid, diabetes, cholesterol; balance, weight changes, and other neurological problems and these were not significant at this time except for   Patient Active Problem List   Diagnosis     Varicose veins of legs     CARDIOVASCULAR SCREENING; LDL GOAL LESS THAN 160     Moderate major depression (H)      Graves disease     Compulsive overeating     Palpitations     Acne     Postoperative hypothyroidism     DAYANARA (generalized anxiety disorder)     Tibialis posterior tendonitis, left     Allergic rhinitis     Anxiety     Migraine variant     Tremor          Allergies   Allergen Reactions     Allegra [Fexofenadine Hydrochloride]      Heart palpitations? sensitivity     Past Surgical History:   Procedure Laterality Date     C/SECTION, LOW TRANSVERSE  11/3/10    , Low Transverse      SECTION N/A 2014    Procedure:  SECTION;  Surgeon: Rubina Birmingham MD;  Location: [facility]DECTOM[facility]       Past Medical History:   Diagnosis Date     Major depressive disorder, recurrent, mild (H) 2008    on wellbutrin     Thyrotoxicosis without mention of goiter or other cause, without mention of thyrotoxic crisis or storm     Graves - Dr. Sanchez     Tremor 2022     Social History     Socioeconomic History     Marital status:      Spouse name: Not on file     Number of children: 2     Years of education: Not on file     Highest education level: Not on file   Occupational History     Occupation: student     Employer: NONE    Tobacco Use     Smoking status: Former Smoker     Packs/day: 0.00     Years: 0.00     Pack years: 0.00     Smokeless tobacco: Never Used     Tobacco comment: smoked from ages [age]   Vaping Use     Vaping Use: Never used   Substance and Sexual Activity     Alcohol use: No     Drug use: No     Sexual activity: Yes     Partners: Male     Comment: spouse is sterile   Other Topics Concern     Parent/sibling w/ CABG, MI or angioplasty before 65F 55M? Yes   Social History Narrative    Caffeine intake/servings daily - 0    Calcium intake/servings daily - 3    Exercise 7 times weekly - describe walks daily    Sunscreen used - Yes    Seatbelts used - No    Guns stored in the home - No    Self Breast Exam - Yes    Pap test up to date -  Yes    Eye exam up to date -   No    Dental exam up to date -  Yes    DEXA scan up to date -  No    Flex Sig/Colonoscopy up to date -  No    Mammography up to date -  No    Immunizations reviewed and up to date - Yes    Abuse: Current or Past (Physical, Sexual or Emotional) - No    Do you feel safe in your environment - Yes    Do you cope well with stress - Yes and No    Do you suffer from insomnia - No    Last updated by: Taylor Ramos  4/23/2014         Social Determinants of Health     Financial Resource Strain: Not on file   Food Insecurity: Not on file   Transportation Needs: Not on file   Physical Activity: Not on file   Stress: Not on file   Social Connections: Not on file   Intimate Partner Violence: Not on file   Housing Stability: Not on file       Drug and lactation database from the [place] National Library of Medicine:  http://toxnet.nlm.nih.gov/cgi-bin/sis/htmlgen?LACT      B/P: Data Unavailable, T: Data Unavailable, P: Data Unavailable, R: Data Unavailable 0 lbs 0 oz  currently breastfeeding., There is no height or weight on file to calculate BMI.  Medications and Vitals not listed above were documented in the cart and reviewed by me.     Current Outpatient Medications   Medication Sig Dispense Refill     adapalene (DIFFERIN) 0.3 % external gel APPLY A PEA SIZE AOUNT TO ENTIRE FACE EVERY OTHER NIGHT INCREASING TO NIGHTLY AS TOLERATED. FOLLOW WITH MOISTURIZER       buPROPion (WELLBUTRIN SR) 150 MG 12 hr tablet TAKE TWO TABLETS BY MOUTH EVERY MORNING AND TAKE ONE TABLET BY MOUTH EVERY EVENING Needs follow-up 270 tablet 3     Cholecalciferol (VITAMIN D3 PO) Take 2,000 Units by mouth daily       clindamycin (CLEOCIN T) 1 % external lotion APPLY THIN LAYER TOPICALLY TO FACE EVERY MORNING AS NEEDED FOR FLARES       clindamycin (CLINDAGEL) 1 % external gel        desonide (DESOWEN) 0.05 % external cream        ketoconazole (NIZORAL) 2 % external cream APPLY A THIN LAYER TO AFFECTED AREA BEHIND EARS 1-2X DAILY        levothyroxine (SYNTHROID/LEVOTHROID) 200 MCG tablet Take 200 mcg by mouth daily       metroNIDAZOLE (METROCREAM) 0.75 % external cream        Multiple Vitamins-Minerals (ONCOVITE) TABS Take 1 tablet by mouth daily       sertraline (ZOLOFT) 50 MG tablet Take 1 tablet (50 mg) by mouth daily 90 tablet 3         Cristobal Lyn MD

## 2022-05-13 ENCOUNTER — PRE VISIT (OUTPATIENT)
Dept: NEUROLOGY | Facility: CLINIC | Age: 42
End: 2022-05-13

## 2022-05-13 ENCOUNTER — MYC MEDICAL ADVICE (OUTPATIENT)
Dept: FAMILY MEDICINE | Facility: CLINIC | Age: 42
End: 2022-05-13

## 2022-05-13 ENCOUNTER — OFFICE VISIT (OUTPATIENT)
Dept: NEUROLOGY | Facility: CLINIC | Age: 42
End: 2022-05-13
Attending: FAMILY MEDICINE
Payer: COMMERCIAL

## 2022-05-13 VITALS
SYSTOLIC BLOOD PRESSURE: 111 MMHG | HEART RATE: 107 BPM | WEIGHT: 210 LBS | DIASTOLIC BLOOD PRESSURE: 70 MMHG | OXYGEN SATURATION: 95 % | BODY MASS INDEX: 32.77 KG/M2

## 2022-05-13 DIAGNOSIS — F33.1 MODERATE EPISODE OF RECURRENT MAJOR DEPRESSIVE DISORDER (H): Primary | ICD-10-CM

## 2022-05-13 DIAGNOSIS — R25.1 TREMOR: Primary | ICD-10-CM

## 2022-05-13 DIAGNOSIS — G25.0 ESSENTIAL TREMOR: ICD-10-CM

## 2022-05-13 PROCEDURE — 99204 OFFICE O/P NEW MOD 45 MIN: CPT | Performed by: PSYCHIATRY & NEUROLOGY

## 2022-05-13 RX ORDER — LEVOTHYROXINE SODIUM 175 UG/1
150 TABLET ORAL EVERY OTHER DAY
COMMUNITY
End: 2023-09-22 | Stop reason: DRUGHIGH

## 2022-05-13 RX ORDER — PROPRANOLOL HYDROCHLORIDE 10 MG/1
10 TABLET ORAL DAILY PRN
Qty: 30 TABLET | Refills: 11 | Status: SHIPPED | OUTPATIENT
Start: 2022-05-13 | End: 2022-06-10

## 2022-05-13 NOTE — TELEPHONE ENCOUNTER
Routing to PCP-     Okay to place mental health referral.     Referral pending if appropriate    Joan Woo RN

## 2022-05-13 NOTE — LETTER
"2022       RE: Keena Packer  [address]  [facility] [address]     Dear Colleague,    Thank you for referring your patient, Keena Packer, to the [facility] NEUROLOGY CLINIC [place] [facility]. Please see a copy of my visit note below.    Diagnosis/Summary/Recommendations:    PATIENT: Keena Packer  42 year old female     : 1980    JIMMY: May 13, 2022    MRN: [id number]    [address]   [facility] [address]     [phone] (M)   [phone] (H)     [email or web address]  CHUY PACKER SPOUSE  [phone]    Assessment:  (R25.1) Tremor  (primary encounter diagnosis)  No family history of tremor   Present since middle school   [employer]  Father had ms - passed away at 72 years. - diagnosed ; born in 1942 -   Has problems using utensils  Has problems with coffee - using two hands  Using two hands for lipstick  Using two hands with spoons  Has problems with board games  Crafter  Embroidery is hard  Knitting is hard  Juni is easier    Right handed  Equally affected to slightly worse on the right side    Caffeine affects her tremor - only drinking 8 ounces  When does not have a good night sleep has problems with more or worse tremor     Review of diagnosis    Tremor     Avoidance of dopamine blockers   Not taking    Motor complication review   no    Review of Impulse control disorders       Review of surgical or medication options   reviewed    Gait/Balance/Falls   \"weak ankles\" - tippy    Exercise/Therapy performed/offered   Walks every few days   Lives in [place] and works at [email or web address] school in [place]    Cognitive/Driving   No problems with driving  Thinking is fine.     Mood   Anxiety disorder  Depression  Compulsive overeating - in recovery for this - over eaters anonymous   Sertraline was started recently -started recently last two years  Had been on buspirone which " stopped working and had been on it since 2003  Bupropion was started in the past - 2005 or thereabouts  No psychiatrist  No counsellor  group therapy = weekly   Speaks to sponsor daily     Walter spouse is a nurse at [employer] - works in home infusion  At the hospital teaching them     Hallucinations/delusions   denies    Sleep   10/1130pm - falls asleep right away  May snore if on her back  Talks in her sleep - l jose long  Wakes up at 635a and tired when wakes up  Rare nocturia     Bladder/Renal/Prostate/Gyn/Other  Some NELI - not signicant    GI/Constipation/GERD   denies    ENDO/Lipid/DM/Bone density/Thyroid  Graves disease  Thyroid disorder  Synthroid 175/200  Franky mccurdy endocrinologist    Cardio/heart/Hyper or Hypotensive   Varicose veins  No blood pressure or fainting issues    Vision/Dry Eyes/Cataracts/Glaucoma/Macular   Wears glasses    Heme/Anticoagulation/Antiplatelet/Anemia/Other  Occasional anemia  May take MVI    ENT/Resp  Allergic rhinitiss    Skin/Cancer/Seborrhea/other  acne    Musculoskeletal/Pain/Headache  Tibialis posterior tendonitis, left  Migraine variant - 1 per year    Other:      Medications           Adapelene differin 0.3% gel       Bupropion wellbutrin SR 150mg 12 hr  2  1    Cholecalciferol Vitamin d3 50mcg 2000 units 1      Clindamycin cleocin  1% l otion       Clindamycin clindagel 1% gel       Desonide desowen 0.05% cream       Ketoconazole nizoral 2% cream       Levothyroxine synthroid/levothyroid 200mcg QOD      Levothyroxine synthroid/levothyroid 175mcg QOD      Metronidazole metrocream 0.75% cream       MVI variable      Sertraline zoloft 50mg  1      Vitamin D3 cholecalciferol 50mcg 2000 units above                                                                Postural tremor and has moderate right >left tremor with action as well  There is no bradykinesia or rigidity and she has normal arm swing and postural stability.       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  2 hours twice a week and will become 1 hour 4 times per week     Discussed management of tremor  Beta blocker - as needed and daily    Primidone/mysoline    Topiramate/topamax    Wearables, weighted utensils, wrist weights    Focused ultrasound, gamma knife, deep brain stimulation    PLAN  Trial of propranolol 10mg as needed    Visit with Savannah Alexander or colleague    Return visit with Clarisa Anderson NP - tetras scale, etc. And review of management.    Granted proxy access to Meme to her spouse WALTER  Mother is a NP and worked in neurosurgery - now retired.  [employer] system         Coding statement:   Medical Decision Making:  #  Chronic progressive medical conditions addressed  - see above --   Review and/or interpretation of unique test or documentation from a provider outside of neurology yes   Independent historian provided additional details  Yes  - Walter  Prescription drug management and review of potential side effects and/or monitoring for side effects  -- see above ---  Health impacted by social determinants of health  no    I have reviewed the note as documented above.  This accurately captures the substance of my conversation with the patient and total time spent preparing for visit, executing visit and completing visit on the day of the visit:  45 minutes.  The portion of this total time included face to face time 840am - 925am    Cristobal Lyn MD     ______________________________________    Last visit date and details:             ______________________________________      Patient was asked about 14 Review of systems including changes in vision (dry eyes, double vision), hearing, heart, lungs, musculoskeletal, depression, anxiety, snoring, RBD, insomnia, urinary frequency, urinary urgency, constipation, swallowing problems, hematological, ID, allergies, skin problems: seborrhea,  endocrinological: thyroid, diabetes, cholesterol; balance, weight changes, and other neurological problems and these were not significant at this time except for   Patient Active Problem List   Diagnosis     Varicose veins of legs     CARDIOVASCULAR SCREENING; LDL GOAL LESS THAN 160     Moderate major depression (H)     Graves disease     Compulsive overeating     Palpitations     Acne     Postoperative hypothyroidism     DAYANARA (generalized anxiety disorder)     Tibialis posterior tendonitis, left     Allergic rhinitis     Anxiety     Migraine variant     Tremor          Allergies   Allergen Reactions     Allegra [Fexofenadine Hydrochloride]      Heart palpitations? sensitivity     Past Surgical History:   Procedure Laterality Date     C/SECTION, LOW TRANSVERSE  11/3/10    , Low Transverse      SECTION N/A 2014    Procedure:  SECTION;  Surgeon: Rubina Birmingham MD;  Location: [facility] APPENDECTOMY       Past Medical History:   Diagnosis Date     Major depressive disorder, recurrent, mild (H) 2008    on wellbutrin     Thyrotoxicosis without mention of goiter or other cause, without mention of thyrotoxic crisis or storm     Graves - Dr. Sanchez     Tremor 2022     Social History     Socioeconomic History     Marital status:      Spouse name: Not on file     Number of children: 2     Years of education: Not on file     Highest education level: Not on file   Occupational History     Occupation: student     Employer: NONE    Tobacco Use     Smoking status: Former Smoker     Packs/day: 0.00     Years: 0.00     Pack years: 0.00     Smokeless tobacco: Never Used     Tobacco comment: smoked from ages [age]   Vaping Use     Vaping Use: Never used   Substance and Sexual Activity     Alcohol use: No     Drug use: No     Sexual activity: Yes     Partners: Male     Comment: spouse is sterile   Other Topics Concern     Parent/sibling w/ CABG, MI or angioplasty before 65F  55M? Yes   Social History Narrative    Caffeine intake/servings daily - 0    Calcium intake/servings daily - 3    Exercise 7 times weekly - describe walks daily    Sunscreen used - Yes    Seatbelts used - No    Guns stored in the home - No    Self Breast Exam - Yes    Pap test up to date -  Yes    Eye exam up to date -  No    Dental exam up to date -  Yes    DEXA scan up to date -  No    Flex Sig/Colonoscopy up to date -  No    Mammography up to date -  No    Immunizations reviewed and up to date - Yes    Abuse: Current or Past (Physical, Sexual or Emotional) - No    Do you feel safe in your environment - Yes    Do you cope well with stress - Yes and No    Do you suffer from insomnia - No    Last updated by: Taylor Raoms  4/23/2014         Social Determinants of Health     Financial Resource Strain: Not on file   Food Insecurity: Not on file   Transportation Needs: Not on file   Physical Activity: Not on file   Stress: Not on file   Social Connections: Not on file   Intimate Partner Violence: Not on file   Housing Stability: Not on file       Drug and lactation database from the [place] National Library of Medicine:  http://toxnet.nlm.nih.gov/cgi-bin/sis/htmlgen?LACT      B/P: Data Unavailable, T: Data Unavailable, P: Data Unavailable, R: Data Unavailable 0 lbs 0 oz  currently breastfeeding., There is no height or weight on file to calculate BMI.  Medications and Vitals not listed above were documented in the cart and reviewed by me.     Current Outpatient Medications   Medication Sig Dispense Refill     adapalene (DIFFERIN) 0.3 % external gel APPLY A PEA SIZE AOUNT TO ENTIRE FACE EVERY OTHER NIGHT INCREASING TO NIGHTLY AS TOLERATED. FOLLOW WITH MOISTURIZER       buPROPion (WELLBUTRIN SR) 150 MG 12 hr tablet TAKE TWO TABLETS BY MOUTH EVERY MORNING AND TAKE ONE TABLET BY MOUTH EVERY EVENING Needs follow-up 270 tablet 3     Cholecalciferol (VITAMIN D3 PO) Take 2,000 Units by mouth daily        clindamycin (CLEOCIN T) 1 % external lotion APPLY THIN LAYER TOPICALLY TO FACE EVERY MORNING AS NEEDED FOR FLARES       clindamycin (CLINDAGEL) 1 % external gel        desonide (DESOWEN) 0.05 % external cream        ketoconazole (NIZORAL) 2 % external cream APPLY A THIN LAYER TO AFFECTED AREA BEHIND EARS 1-2X DAILY       levothyroxine (SYNTHROID/LEVOTHROID) 200 MCG tablet Take 200 mcg by mouth daily       metroNIDAZOLE (METROCREAM) 0.75 % external cream        Multiple Vitamins-Minerals (ONCOVITE) TABS Take 1 tablet by mouth daily       sertraline (ZOLOFT) 50 MG tablet Take 1 tablet (50 mg) by mouth daily 90 tablet 3       Cristobal Lyn MD

## 2022-05-13 NOTE — PATIENT INSTRUCTIONS
"Assessment:  (R25.1) Tremor  (primary encounter diagnosis)  No family history of tremor   Present since middle school   [facility]  Father had ms - passed away at 72 years. - diagnosed 1979; born in 1942 -   Has problems using utensils  Has problems with coffee - using two hands  Using two hands for lipstick  Using two hands with spoons  Has problems with board games  Crafter  Embroidery is hard  Knitting is hard  Juni is easier    Right handed  Equally affected to slightly worse on the right side    Caffeine affects her tremor - only drinking 8 ounces  When does not have a good night sleep has problems with more or worse tremor     Review of diagnosis    Tremor     Avoidance of dopamine blockers   Not taking    Motor complication review   no    Review of Impulse control disorders       Review of surgical or medication options   reviewed    Gait/Balance/Falls   \"weak ankles\" - tippy    Exercise/Therapy performed/offered   Walks every few days   Lives in [place] and works at [employer] in [place]    Cognitive/Driving   No problems with driving  Thinking is fine.     Mood   Anxiety disorder  Depression  Compulsive overeating - in recovery for this - over eaters anonymous   Sertraline was started recently -started recently last two years  Had been on buspirone which stopped working and had been on it since 2003  Bupropion was started in the past - 2005 or thereabouts  No psychiatrist  No counsellor  group therapy = weekly   Speaks to sponsor daily     Walter spouse is a nurse at [employer] - works in home infusion  At the hospital teaching them     Hallucinations/delusions   denies    Sleep   10/1130pm - falls asleep right away  May snore if on her back  Talks in her sleep - l jose long  Wakes up at 635a and tired when wakes up  Rare nocturia     Bladder/Renal/Prostate/Gyn/Other  Some NELI - not signicant    GI/Constipation/GERD   denies    ENDO/Lipid/DM/Bone density/Thyroid  Graves disease  Thyroid " disorder  Synthroid 175/200  Franky mccurdy endocrinologist    Cardio/heart/Hyper or Hypotensive   Varicose veins  No blood pressure or fainting issues    Vision/Dry Eyes/Cataracts/Glaucoma/Macular   Wears glasses    Heme/Anticoagulation/Antiplatelet/Anemia/Other  Occasional anemia  May take MVI    ENT/Resp  Allergic rhinitiss    Skin/Cancer/Seborrhea/other  acne    Musculoskeletal/Pain/Headache  Tibialis posterior tendonitis, left  Migraine variant - 1 per year    Other:      Medications           Adapelene differin 0.3% gel       Bupropion wellbutrin SR 150mg 12 hr  2  1    Cholecalciferol Vitamin d3 50mcg 2000 units 1      Clindamycin cleocin  1% l otion       Clindamycin clindagel 1% gel       Desonide desowen 0.05% cream       Ketoconazole nizoral 2% cream       Levothyroxine synthroid/levothyroid 200mcg QOD      Levothyroxine synthroid/levothyroid 175mcg QOD      Metronidazole metrocream 0.75% cream       MVI variable      Sertraline zoloft 50mg  1      Vitamin D3 cholecalciferol 50mcg 2000 units above                                                                Postural tremor and has moderate right >left tremor with action as well  There is no bradykinesia or rigidity and she has normal arm swing and postural stability.       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  2 hours twice a week and will become 1 hour 4 times per week     Discussed management of tremor  Beta blocker - as needed and daily    Primidone/mysoline    Topiramate/topamax    Wearables, weighted utensils, wrist weights    Focused ultrasound, gamma knife, deep brain stimulation    PLAN  Trial of propranolol 10mg as needed    Visit with Savannah Alexander or colleague    Return visit with Clarisa Anderson NP - tetras scale, etc. And review of management.    Granted proxy access to [place] to her spouse CHUY  Mother  is a NP and worked in neurosurgery - now retired.  [facility]

## 2022-05-27 ENCOUNTER — VIRTUAL VISIT (OUTPATIENT)
Dept: PHARMACY | Facility: CLINIC | Age: 42
End: 2022-05-27
Payer: COMMERCIAL

## 2022-05-27 DIAGNOSIS — Z78.9 TAKES DIETARY SUPPLEMENTS: ICD-10-CM

## 2022-05-27 DIAGNOSIS — L70.9 ACNE, UNSPECIFIED ACNE TYPE: ICD-10-CM

## 2022-05-27 DIAGNOSIS — F33.42 RECURRENT MAJOR DEPRESSIVE DISORDER, IN FULL REMISSION (H): ICD-10-CM

## 2022-05-27 DIAGNOSIS — R25.1 TREMOR: Primary | ICD-10-CM

## 2022-05-27 DIAGNOSIS — E89.0 POSTOPERATIVE HYPOTHYROIDISM: ICD-10-CM

## 2022-05-27 DIAGNOSIS — L71.9 ROSACEA: ICD-10-CM

## 2022-05-27 DIAGNOSIS — F41.1 GAD (GENERALIZED ANXIETY DISORDER): ICD-10-CM

## 2022-05-27 PROCEDURE — 99605 MTMS BY PHARM NP 15 MIN: CPT | Performed by: PHARMACIST

## 2022-05-27 NOTE — PROGRESS NOTES
Medication Therapy Management (MTM) Encounter    ASSESSMENT:                            Medication Adherence/Access: No issues identified    Essential tremor:  Educated patient on use of propranolol as needed before activities that require more tremor control. As-needed use of propranolol is typically more effective than daily use for tremor.     Depression/anxiety:  Plan in place to establish care with a therapist and psychiatrist in July. We discussed various medication changes that could be considered at that time to improve her mood and minimize contribution to tremor. See plan for these recommendations.     Hypothyroidism: patient appears to be due for thyroid levels. Excess thyroid replacement can contribute to tremor.     Acne/Rosacea: stable     Vitamins/supplements: could recheck vitamin D in a few months, likely will be higher in the summer and with supplementation     PLAN:                            1. It appears you are due for thyroid labs. It is important to make sure you are not on too much levothyroxine as too much thyroid replacement can contribute to tremor.     2. We discussed that you may try propranolol 10 mg, 1 tablet as needed -- 30-60 minutes before activities that require more tremor control (ie: playing board games)    3. Considerations for medication changes to help with mood and minimize tremor:  -- could restart buspirone and titrate to higher dose than 30 mg/day (low risk of tremor)   -- could add lamotrigine (a mood stabilizer- low risk of tremor)   -- could switch sertraline to a SNRI antidepressant which may have slightly less risk of tremor (ie: venlafaxine or duloxetine)    Follow-up: 3-6 months or sooner if needed    SUBJECTIVE/OBJECTIVE:                          Keena Tobias is a 42 year old female called for an initial visit. She was referred to me from Dr. Lyn.      Reason for visit: medication review; wondering if Zoloft is increasing her tremor    Allergies/ADRs:  "Reviewed in chart  Past Medical History: Reviewed in chart  Tobacco: She reports that she has quit smoking. She smoked 0.00 packs per day for 0.00 years. She has never used smokeless tobacco.  Alcohol: sober for 16 years   Caffeine: 8 ounces of coffee every morning and 3 times/week cup of green tea/de-caff tea in afternoon    Medication Adherence/Access:   Patient uses pill box(es) and  (Rx) fills the pill pill box(es).  Patient takes medications 2 time(s) per day.   Per patient, misses medication 0 times per week - occasionally   Medication barriers: none.   The patient fills medications at [place]: yes- mail order    Essential tremor:  Has not tried propranolol from Dr. Lyn yet- wondering how she should take it. Patient states she has had the tremor for a long time, noticed first in middle school, but has gotten worse in the last 2 years. She has trouble drinking out of a cup and applying lipstick. Her tremor is \"always there\" and is present bilaterally. Of note, she took high-dose propranolol around 9638-0875 when she had \"thyroid storm\" and her thyroid was removed     Depression/anxiety:  Current medications include: sertraline 50 mg daily (morning- hour after Synthroid) and bupropion  mg AM/150 mg evening. Scheduled to see a new therapist and psychiatrist on 7/6/22. Anxiety and depression symptoms have been \"pretty bad.\" She can't tell how much the medications are working as she feels she has had a \"slow decline\" of symptoms. However, she states that she is still functional and getting up and showering which is a sign she is doing okay. She has had severe depression in the past. She tried reducing bupropion and states she \"fell apart\" when the lower dose. In the past she took buspirone which helped for a while then seemed to stop working. She also tried citalopram which was not helpful. 20+ years ago she was on venlafaxine.     DAYANARA-7 SCORE 1/19/2021 4/15/2021 11/18/2021   Total Score - - - "   Total Score - - 7 (mild anxiety)   Total Score 19 6 7     PHQ-9 SCORE 11/3/2020 4/15/2021 11/18/2021   PHQ-9 Total Score - - -   PHQ-9 Total Score [facility] 4 (Minimal depression) - 4 (Minimal depression)   PHQ-9 Total Score 4 2 4     Hypothyroidism: Patient is taking levothyroxine 175 mcg/200 mcg every other day- taking this with coffee every morning. She follows with an endocrinologist. Has blood work every 6 months when stable. Patient is having the following symptoms: hyperthyroidism -  resting tremor.   TSH   Date Value Ref Range Status   10/02/2021 0.02 (L) 0.30 - 5.00 uIU/mL Final   04/26/2021 <0.01 (L) 0.40 - 4.00 mU/L Final     Acne/Rosacea: uses the following topicals, which are working well:  Differin at night  Clindamycin in the morning   Metrocream in the morning     Vitamins/supplements:   Multivitamin (Centrum) daily   Vitamin D3 2000 units daily  (last vitamin D level was 27 on 2/23/22)    Today's Vitals: There were no vitals taken for this visit.  ----------------    I spent 34 minutes with this patient today. I offer these suggestions for consideration by Dr. Lyn/Dr. Patton. A copy of the visit note was provided to the patient's provider(s).    The patient was sent via [employer] a summary of these recommendations.     Savannah Alexander, Pharm.D.  Medication Therapy Management Pharmacist  [facility]    Telemedicine Visit Details  Type of service:  Telephone visit  Start Time: 2:34 PM  End Time: 3:08 PM  Originating Location (patient location): Home  Distant Location (provider location):  [facility]     Medication Therapy Recommendations  No medication therapy recommendations to display

## 2022-06-10 ENCOUNTER — VIRTUAL VISIT (OUTPATIENT)
Dept: FAMILY MEDICINE | Facility: CLINIC | Age: 42
End: 2022-06-10
Payer: COMMERCIAL

## 2022-06-10 DIAGNOSIS — F41.9 ANXIETY: ICD-10-CM

## 2022-06-10 DIAGNOSIS — F33.1 MODERATE EPISODE OF RECURRENT MAJOR DEPRESSIVE DISORDER (H): Primary | ICD-10-CM

## 2022-06-10 PROCEDURE — 99213 OFFICE O/P EST LOW 20 MIN: CPT | Mod: 95 | Performed by: FAMILY MEDICINE

## 2022-06-10 RX ORDER — BUSPIRONE HYDROCHLORIDE 15 MG/1
15 TABLET ORAL 2 TIMES DAILY
Qty: 60 TABLET | Refills: 1 | Status: SHIPPED | OUTPATIENT
Start: 2022-06-10 | End: 2022-07-06

## 2022-06-10 RX ORDER — BUPROPION HYDROCHLORIDE 300 MG/1
300 TABLET ORAL EVERY MORNING
Qty: 30 TABLET | Refills: 1 | Status: SHIPPED | OUTPATIENT
Start: 2022-06-10 | End: 2022-07-06

## 2022-06-10 ASSESSMENT — ANXIETY QUESTIONNAIRES
8. IF YOU CHECKED OFF ANY PROBLEMS, HOW DIFFICULT HAVE THESE MADE IT FOR YOU TO DO YOUR WORK, TAKE CARE OF THINGS AT HOME, OR GET ALONG WITH OTHER PEOPLE?: EXTREMELY DIFFICULT
2. NOT BEING ABLE TO STOP OR CONTROL WORRYING: NEARLY EVERY DAY
3. WORRYING TOO MUCH ABOUT DIFFERENT THINGS: NEARLY EVERY DAY
6. BECOMING EASILY ANNOYED OR IRRITABLE: NEARLY EVERY DAY
1. FEELING NERVOUS, ANXIOUS, OR ON EDGE: NEARLY EVERY DAY
7. FEELING AFRAID AS IF SOMETHING AWFUL MIGHT HAPPEN: NEARLY EVERY DAY
7. FEELING AFRAID AS IF SOMETHING AWFUL MIGHT HAPPEN: NEARLY EVERY DAY
GAD7 TOTAL SCORE: 21
4. TROUBLE RELAXING: NEARLY EVERY DAY
5. BEING SO RESTLESS THAT IT IS HARD TO SIT STILL: NEARLY EVERY DAY

## 2022-06-10 ASSESSMENT — PATIENT HEALTH QUESTIONNAIRE - PHQ9
SUM OF ALL RESPONSES TO PHQ QUESTIONS 1-9: 19
10. IF YOU CHECKED OFF ANY PROBLEMS, HOW DIFFICULT HAVE THESE PROBLEMS MADE IT FOR YOU TO DO YOUR WORK, TAKE CARE OF THINGS AT HOME, OR GET ALONG WITH OTHER PEOPLE: EXTREMELY DIFFICULT
SUM OF ALL RESPONSES TO PHQ QUESTIONS 1-9: 19

## 2022-06-10 NOTE — PATIENT INSTRUCTIONS
Stop the Wellbutrin SR and start Wellbutrin extended release 300 mg daily    Start the BuSpar 15 mg twice a day    Slowly taper off Zoloft    Takes Zoloft 50 mg alternating with 25 mg for 7 to 10 days    Then decrease to 25 mg daily for 7 to 10 days    Then decrease to 25 mg every other day for 7 to 10 days then stop    Follow-up with psychiatry in a month

## 2022-06-10 NOTE — PROGRESS NOTES
"Keena is a 42 year old who is being evaluated via a billable video visit.      How would you like to obtain your AVS? MyChart  If the video visit is dropped, the invitation should be resent by: Text to cell phone: [phone]  Will anyone else be joining your video visit? No    Video Start Time: 12:57 PM    Assessment & Plan     Moderate episode of recurrent major depressive disorder (H)    - buPROPion (WELLBUTRIN XL) 300 MG 24 hr tablet; Take 1 tablet (300 mg) by mouth every morning    Anxiety    - busPIRone (BUSPAR) 15 MG tablet; Take 1 tablet (15 mg) by mouth 2 times daily    ASSESSMENT/PLAN:      ICD-10-CM    1. Moderate episode of recurrent major depressive disorder (H)  F33.1 buPROPion (WELLBUTRIN XL) 300 MG 24 hr tablet   2. Anxiety  F41.9 busPIRone (BUSPAR) 15 MG tablet       Patient Instructions   Stop the Wellbutrin SR and start Wellbutrin extended release 300 mg daily    Start the BuSpar 15 mg twice a day    Slowly taper off Zoloft    Takes Zoloft 50 mg alternating with 25 mg for 7 to 10 days    Then decrease to 25 mg daily for 7 to 10 days    Then decrease to 25 mg every other day for 7 to 10 days then stop    Follow-up with psychiatry in a month          20 minutes spent on the date of the encounter doing chart review, history and exam, documentation and further activities per the note       BMI:   Estimated body mass index is 32.77 kg/m  as calculated from the following:    Height as of 2/23/22: 1.705 m (5' 7.13\").    Weight as of 5/13/22: 95.3 kg (210 lb).   Weight management plan: Discussed healthy diet and exercise guidelines      No follow-ups on file.    DO RICHARD Serrano [facility]    Murray Brink is a 42 year old who presents for the following health issues     History of Present Illness       Mental Health Follow-up:  Patient presents to follow-up on Depression & Anxiety.Patient's depression since last visit has been:  Worse  The patient is having other " symptoms associated with depression.  Patient's anxiety since last visit has been:  Worse  The patient is having other symptoms associated with anxiety.  Any significant life events: other  Patient is feeling anxious or having panic attacks.  Patient has no concerns about alcohol or drug use.    She eats 4 or more servings of fruits and vegetables daily.She consumes 0 sweetened beverage(s) daily.She exercises with enough effort to increase her heart rate 9 or less minutes per day.  She exercises with enough effort to increase her heart rate 3 or less days per week. She is missing 1 dose(s) of medications per week.  She is not taking prescribed medications regularly due to remembering to take.    Today's PHQ-9         PHQ-9 Total Score: 19    PHQ-9 Q9 Thoughts of better off dead/self-harm past 2 weeks :   Not at all    How difficult have these problems made it for you to do your work, take care of things at home, or get along with other people: Extremely difficult  Today's DAYANARA-7 Score: 21        Zoloft 50 mg every day and Wellbutrin 150 mg bid.  She is only missing this once a week.  She was on buspar 15 mg bid     She has an essential tremor.  She is on propranolol 10 mg which she only took one day.      Social History     Tobacco Use     Smoking status: Former Smoker     Packs/day: 0.00     Years: 0.00     Pack years: 0.00     Smokeless tobacco: Never Used     Tobacco comment: smoked from ages [age]   Vaping Use     Vaping Use: Never used   Substance Use Topics     Alcohol use: No     Drug use: No     PHQ 4/15/2021 11/18/2021 6/10/2022   PHQ-9 Total Score 2 4 19   Q9: Thoughts of better off dead/self-harm past 2 weeks Not at all Not at all Not at all     DAYANARA-7 SCORE 4/15/2021 11/18/2021 6/10/2022   Total Score - - -   Total Score - 7 (mild anxiety) 21 (severe anxiety)   Total Score 6 7 21     Last PHQ-9 6/10/2022   1.  Little interest or pleasure in doing things 3   2.  Feeling down, depressed, or hopeless 3   3.   Trouble falling or staying asleep, or sleeping too much 3   4.  Feeling tired or having little energy 3   5.  Poor appetite or overeating 1   6.  Feeling bad about yourself 3   7.  Trouble concentrating 3   8.  Moving slowly or restless 0   Q9: Thoughts of better off dead/self-harm past 2 weeks 0   PHQ-9 Total Score 19   Difficulty at work, home, or with people -     DAYANARA-7  6/10/2022   1. Feeling nervous, anxious, or on edge 3   2. Not being able to stop or control worrying 3   3. Worrying too much about different things 3   4. Trouble relaxing 3   5. Being so restless that it is hard to sit still 3   6. Becoming easily annoyed or irritable 3   7. Feeling afraid, as if something awful might happen 3   DAYANARA-7 Total Score 21   If you checked any problems, how difficult have they made it for you to do your work, take care of things at home, or get along with other people? -       Suicide Assessment Five-step Evaluation and Treatment (SAFE-T)          Review of Systems   Constitutional, HEENT, cardiovascular, pulmonary, gi and gu systems are negative, except as otherwise noted.      Objective           Vitals:  No vitals were obtained today due to virtual visit.    Physical Exam   GENERAL: Healthy, alert and no distress  EYES: Eyes grossly normal to inspection.  No discharge or erythema, or obvious scleral/conjunctival abnormalities.  RESP: No audible wheeze, cough, or visible cyanosis.  No visible retractions or increased work of breathing.    SKIN: Visible skin clear. No significant rash, abnormal pigmentation or lesions.  NEURO: Cranial nerves grossly intact.  Mentation and speech appropriate for age.  PSYCH: Mentation appears normal, affect normal/bright, judgement and insight intact, normal speech and appearance well-groomed.            Video-Visit Details    Type of service:  Video Visit    Video End Time:1:13 PM    Originating Location (pt. Location): Home    Distant Location (provider location):  [facility]  [facility]     Platform used for Video Visit: Garcia

## 2022-06-29 ASSESSMENT — ANXIETY QUESTIONNAIRES
GAD7 TOTAL SCORE: 21
6. BECOMING EASILY ANNOYED OR IRRITABLE: NEARLY EVERY DAY
4. TROUBLE RELAXING: NEARLY EVERY DAY
2. NOT BEING ABLE TO STOP OR CONTROL WORRYING: NEARLY EVERY DAY
8. IF YOU CHECKED OFF ANY PROBLEMS, HOW DIFFICULT HAVE THESE MADE IT FOR YOU TO DO YOUR WORK, TAKE CARE OF THINGS AT HOME, OR GET ALONG WITH OTHER PEOPLE?: EXTREMELY DIFFICULT
5. BEING SO RESTLESS THAT IT IS HARD TO SIT STILL: NEARLY EVERY DAY
1. FEELING NERVOUS, ANXIOUS, OR ON EDGE: NEARLY EVERY DAY
7. FEELING AFRAID AS IF SOMETHING AWFUL MIGHT HAPPEN: NEARLY EVERY DAY
GAD7 TOTAL SCORE: 21
3. WORRYING TOO MUCH ABOUT DIFFERENT THINGS: NEARLY EVERY DAY
GAD7 TOTAL SCORE: 21
7. FEELING AFRAID AS IF SOMETHING AWFUL MIGHT HAPPEN: NEARLY EVERY DAY

## 2022-07-06 ENCOUNTER — MYC MEDICAL ADVICE (OUTPATIENT)
Dept: PSYCHIATRY | Facility: CLINIC | Age: 42
End: 2022-07-06

## 2022-07-06 ENCOUNTER — VIRTUAL VISIT (OUTPATIENT)
Dept: BEHAVIORAL HEALTH | Facility: CLINIC | Age: 42
End: 2022-07-06
Payer: COMMERCIAL

## 2022-07-06 ENCOUNTER — VIRTUAL VISIT (OUTPATIENT)
Dept: PSYCHIATRY | Facility: CLINIC | Age: 42
End: 2022-07-06
Attending: FAMILY MEDICINE
Payer: COMMERCIAL

## 2022-07-06 VITALS — BODY MASS INDEX: 31.68 KG/M2 | WEIGHT: 203 LBS

## 2022-07-06 DIAGNOSIS — F33.1 MODERATE EPISODE OF RECURRENT MAJOR DEPRESSIVE DISORDER (H): Primary | ICD-10-CM

## 2022-07-06 DIAGNOSIS — F41.9 ANXIETY: ICD-10-CM

## 2022-07-06 DIAGNOSIS — F50.819 BINGE EATING DISORDER: ICD-10-CM

## 2022-07-06 DIAGNOSIS — F41.9 ANXIETY DISORDER, UNSPECIFIED TYPE: ICD-10-CM

## 2022-07-06 PROCEDURE — 99417 PROLNG OP E/M EACH 15 MIN: CPT | Mod: 95 | Performed by: PSYCHIATRY & NEUROLOGY

## 2022-07-06 PROCEDURE — 90791 PSYCH DIAGNOSTIC EVALUATION: CPT | Mod: 52 | Performed by: SOCIAL WORKER

## 2022-07-06 PROCEDURE — 99205 OFFICE O/P NEW HI 60 MIN: CPT | Mod: 95 | Performed by: PSYCHIATRY & NEUROLOGY

## 2022-07-06 RX ORDER — BUPROPION HYDROCHLORIDE 300 MG/1
300 TABLET ORAL EVERY MORNING
Qty: 90 TABLET | Refills: 1 | Status: SHIPPED | OUTPATIENT
Start: 2022-07-06 | End: 2022-09-08

## 2022-07-06 RX ORDER — BUSPIRONE HYDROCHLORIDE 30 MG/1
30 TABLET ORAL 2 TIMES DAILY
Qty: 60 TABLET | Refills: 2 | Status: SHIPPED | OUTPATIENT
Start: 2022-07-06 | End: 2022-09-30

## 2022-07-06 ASSESSMENT — PATIENT HEALTH QUESTIONNAIRE - PHQ9
SUM OF ALL RESPONSES TO PHQ QUESTIONS 1-9: 19
10. IF YOU CHECKED OFF ANY PROBLEMS, HOW DIFFICULT HAVE THESE PROBLEMS MADE IT FOR YOU TO DO YOUR WORK, TAKE CARE OF THINGS AT HOME, OR GET ALONG WITH OTHER PEOPLE: EXTREMELY DIFFICULT
10. IF YOU CHECKED OFF ANY PROBLEMS, HOW DIFFICULT HAVE THESE PROBLEMS MADE IT FOR YOU TO DO YOUR WORK, TAKE CARE OF THINGS AT HOME, OR GET ALONG WITH OTHER PEOPLE: EXTREMELY DIFFICULT
SUM OF ALL RESPONSES TO PHQ QUESTIONS 1-9: 19

## 2022-07-06 NOTE — PROGRESS NOTES
"Telemedicine Visit: The patient's condition can be safely assessed and treated via synchronous audio and visual telemedicine encounter.      Reason for Telemedicine Visit: Services only offered telehealth    Originating Site (Patient Location): Patient's home    Distant Site (Provider Location): Provider Remote Setting- Home Office    Consent:  The patient/guardian has verbally consented to: the potential risks and benefits of telemedicine (video visit) versus in person care; bill my insurance or make self-payment for services provided; and responsibility for payment of non-covered services.     Mode of Communication:  Video Conference via [employer]    As the provider I attest to compliance with applicable laws and regulations related to telemedicine.      Keena is a 42 year old who is being evaluated via a billable video visit.      How would you like to obtain your AVS? MyChart  If the video visit is dropped, the invitation should be resent by: Text to cell phone: [phone]  Will anyone else be joining your video visit? No                                                              Outpatient Psychiatric Evaluation- Standard  Adult    Name:  Keena Tobias  : 1980    Source of Referral:  Primary Care Provider: Silvana Patton DO   Current Psychotherapist: None, will meet with Ibis     Identifying Data:  Patient is a 42 year old  female  who presents for initial visit.  Patient attended the session alone. Patient prefers to be called Keena.    My Practice Policy was reviewed.     Chief Complaint:  \"My tremor got worse and may be related to my psych medications, I am not doing well with my anxiety and depression.\"    HPI:  Per DA by Ibis Sierra, LICSW:  Pt shares that the past several years she has struggled with anxiety and depression. Can't remember a time when she didn't have them.  Started on meds in  with celexa and it didn't do anything.  Switched to wellbutrin which " "helped but has been on it for a long time with other medications being added in periodically.    Has an essential tremor since [employer] which has worsened the past year or so especially since starting on sertraline. Has been tapered off the sertraline just recently which pt feels has helped and started on buspar.    Also over the past year or so has noticed an increase in depression and anxiety.  Denies specific stressors.  Main symptoms are; lack of motivation and energy and having lot of anxiety where she gets stuck in negative thoughts.    Has recently seen a MTM provider and possible recommendations were made.    Denies any substance use as she has a hx of binge eating and is in overeaters anonymous and has a sponsor.  Has been in recovery for several years.  Lives with  and 3 kids.  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  Doesn't care for her job and feels that she is not performing adequately although she gets reports from work that she is doing great.       Keena does not remember a time that she was not depressed and anxious.  She has also struggled with overeating and binge eating \"since her hand could reach the counter.\"  She was first treated for depression when she was 21 years old, and has been on medications most of the last 21 years.      She has tried citalopram, which did not seem to be effective.  She has been on bupropion most of the time for the last several years, and feels that it does help her depression.  She does not feel that it has increased her tremor.  Buspirone was quite helpful initially, but her anxiety continued to increase, so it was stopped and sertraline was tried.  Unfortunately, sertraline caused her underlying tremor to increase to the point that she had difficulty holding a glass to her mouth and could not type.  She discontinued her sertraline, and her tremor has now returned to baseline.    She reports having tremor for as long as she can " "remember, and first had treatment for it in 1995.  She did see a neurologist recently, and was prescribed a beta-blocker.  Unfortunately, even taking 10 mg of propranolol on one occasion made her depressive symptoms significantly worse.  It did alleviate the tremor.    She denies any acute stressors at present, but does not feel like that she is functioning well at work or at home.  She is having difficulty showering on a regular basis.  She has been snappy and irritable with her kids.  She is apathetic about her job, and reports that she \"just sits and does nothing\" when she goes to work.  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  She \"kind of hates it.\"  She really enjoyed her old job, and is hoping to return to the law firm sometime later this year.  She has a very supportive .    Psychiatric Review of Symptoms:  Depression: Keena reports that she has been depressed for at least the last year and a half, but has had ongoing problems with depression throughout her life.  She currently rates her mood as 3 out of 10 with 10 being the best.  She reports a decreased interest in her normal activities, low energy, and low motivation.  She feels hopeless, helpless, worthless, and guilty.  Her concentration is decreased with her decreased mood.  She has thoughts of death but denies any thoughts of suicide and has never attempted suicide.    She finds it hard to physically get herself to go to bed in the evening but once she does go to bed she denies problems with initial, middle, or terminal insomnia, and usually sleeps for 7 or 8 hours/day.     Mood rating (1 to 10 with 10 being the best): 3   PHQ-9 scores:   PHQ-9 SCORE 6/10/2022 7/6/2022 7/6/2022   PHQ-9 Total Score - - -   PHQ-9 Total Score MyChart 19 (Moderately severe depression) - 19 (Moderately severe depression)   PHQ-9 Total Score 19 19 19       Nadia: She reports she had " "some elevated mood in the past prior to being diagnosed with Graves' disease.  She believes these were due to elevated thyroid levels.   MDQ Score: Not completed    Anxiety: She reports excessive anxiety and worry that is difficult for her to control.  She feels restless, has difficulty concentrating, and is irritable.  She describes her thoughts as \"a movie of horrible things happening to her and her family.\"  She will feel \"trapped in these thoughts and frozen.\"   DAYANARA-7 scores:    DAYANARA-7 SCORE 11/18/2021 6/10/2022 6/29/2022   Total Score - - -   Total Score 7 (mild anxiety) 21 (severe anxiety) 21 (severe anxiety)   Total Score 7 21 21       Panic: She has panic attacks once or twice a week that last for 1 to 2 hours with palpitations, increased tremor, numbness and tingling, and a sense of impending doom.    PTSD: No history of life-threatening trauma.    OCD: When her depression is bad she has compulsive behaviors such as making lists or reading excessively she is unable to stop her behavior even though it interferes with her functioning.    Psychosis: No history of auditory or visual hallucinations, paranoid ideations, ideas of reference, or thought insertion or extraction.    Impulse control: No history of gambling, shoplifting, or violent outbursts.    Eating Disorder: She has a history of binge eating disorder, but is currently in recovery.  She attends a 12-step group and has a sponsor.  She has a very regimented diet that was recommended by a nutritionist.    Psychiatric History:   No previous psychiatric hospitalizations    No history of chemical dependency treatment    Suicide Risk Assessment:  Suicide Attempts: No   Self-injurious Behavior: She reports that she did some cutting when she was younger but not for several years.    Past Medical History:  Medical history:   Past Medical History:   Diagnosis Date     Major depressive disorder, recurrent, mild (H) 8/14/2008    on wellbutrin     Thyrotoxicosis " without mention of goiter or other cause, without mention of thyrotoxic crisis or storm     Graves - Dr. Sanchez     Tremor 2022       Surgical history:   Past Surgical History:   Procedure Laterality Date     C/SECTION, LOW TRANSVERSE  11/3/10    , Low Transverse      SECTION N/A 2014    Procedure:  SECTION;  Surgeon: Rubina Birmingham MD;  [facility]: [facility]     [facility] APPENDECTOMY         Pregnancy status: Not pregnant    Trauma: No history of head trauma or seizures.    Current Medications:    Current Outpatient Medications:      adapalene (DIFFERIN) 0.3 % external gel, Apply topically At Bedtime, Disp: , Rfl:      buPROPion (WELLBUTRIN XL) 300 MG 24 hr tablet, Take 1 tablet (300 mg) by mouth every morning, Disp: 90 tablet, Rfl: 1     busPIRone (BUSPAR) 30 MG tablet, Take 1 tablet (30 mg) by mouth 2 times daily, Disp: 60 tablet, Rfl: 2     clindamycin (CLEOCIN T) 1 % external lotion, APPLY THIN LAYER TOPICALLY TO FACE EVERY MORNING AS NEEDED FOR FLARES, Disp: , Rfl:      levothyroxine (SYNTHROID/LEVOTHROID) 175 MCG tablet, Take 175 mcg by mouth every other day, Disp: , Rfl:      levothyroxine (SYNTHROID/LEVOTHROID) 200 MCG tablet, Take 200 mcg by mouth every other day, Disp: , Rfl:      metroNIDAZOLE (METROCREAM) 0.75 % external cream, Apply topically every morning, Disp: , Rfl:      vitamin D3 (CHOLECALCIFEROL) 50 mcg (2000 units) tablet, Take 2,000 Units by mouth daily, Disp: , Rfl:      Multiple Vitamins-Minerals (CENTRUM ADULTS PO), Take 1 tablet by mouth daily, Disp: , Rfl:     Supplements: Reviewed per Electronic Medical Record Today    The [place] Prescription Monitoring Program has been reviewed and there are no concerns about diversionary activity for controlled substances at this time.  No data for controlled substances over the last one year.     Past medication trials include but are not limited to:   See HPI    Allergies:  Allegra [fexofenadine  hydrochloride]    Vital Signs:  Vitals: Wt 92.1 kg (203 lb)   LMP 06/22/2022 (Exact Date)   Breastfeeding No   BMI 31.68 kg/m      Labs:  Most recent laboratory results reviewed and the pertinent results include:   Ref Range & Units 4 mo ago       Vitamin D, Total (25-Hydroxy) 30 - 80 ug/L 27 Low       Last Comprehensive Metabolic Panel:  Sodium   Date Value Ref Range Status   02/23/2022 140 136 - 145 mmol/L Final   10/03/2019 139 133 - 144 mmol/L Final     Potassium   Date Value Ref Range Status   02/23/2022 4.6 3.5 - 5.0 mmol/L Final   10/03/2019 4.8 3.4 - 5.3 mmol/L Final     Chloride   Date Value Ref Range Status   02/23/2022 104 98 - 107 mmol/L Final   10/03/2019 105 94 - 109 mmol/L Final     Carbon Dioxide   Date Value Ref Range Status   10/03/2019 25 20 - 32 mmol/L Final     Carbon Dioxide (CO2)   Date Value Ref Range Status   02/23/2022 28 22 - 31 mmol/L Final     Anion Gap   Date Value Ref Range Status   02/23/2022 8 5 - 18 mmol/L Final   10/03/2019 9 3 - 14 mmol/L Final     Glucose   Date Value Ref Range Status   02/23/2022 91 70 - 125 mg/dL Final   10/03/2019 85 70 - 99 mg/dL Final     Urea Nitrogen   Date Value Ref Range Status   02/23/2022 12 8 - 22 mg/dL Final   10/03/2019 16 7 - 30 mg/dL Final     Creatinine   Date Value Ref Range Status   02/23/2022 0.82 0.60 - 1.10 mg/dL Final   10/03/2019 0.72 0.52 - 1.04 mg/dL Final     GFR Estimate   Date Value Ref Range Status   02/23/2022 >90 >60 mL/min/1.73m2 Final     Comment:     Effective December 21, 2021 eGFRcr in adults is calculated using the 2021 CKD-EPI creatinine equation which includes age and gender (Eddie garg al., NEJM, DOI: 10.1056/SCOHeb3193891)   10/03/2019 >90 >60 mL/min/[1.73_m2] Final     Comment:     Non  GFR Calc  Starting 12/18/2018, serum creatinine based estimated GFR (eGFR) will be   calculated using the Chronic Kidney Disease Epidemiology Collaboration   (CKD-EPI) equation.       Calcium   Date Value Ref Range Status    02/23/2022 9.6 8.5 - 10.5 mg/dL Final   10/03/2019 8.8 8.5 - 10.1 mg/dL Final     Bilirubin Total   Date Value Ref Range Status   02/23/2022 0.5 0.0 - 1.0 mg/dL Final   08/17/2007 0.3 0.2 - 1.3 mg/dL Final     Alkaline Phosphatase   Date Value Ref Range Status   02/23/2022 59 45 - 120 U/L Final   08/17/2007 77 40 - 150 U/L Final     ALT   Date Value Ref Range Status   02/23/2022 16 0 - 45 U/L Final   08/17/2007 22 0 - 50 U/L Final     AST   Date Value Ref Range Status   02/23/2022 18 0 - 40 U/L Final   08/17/2007 20 0 - 45 U/L Final     Lab Results   Component Value Date    WBC 4.6 02/23/2022    WBC 5.5 11/11/2014     Lab Results   Component Value Date    RBC 4.86 02/23/2022    RBC 3.78 11/11/2014     Lab Results   Component Value Date    HGB 14.4 02/23/2022    HGB 10.3 11/12/2014     Lab Results   Component Value Date    HCT 43.7 02/23/2022    HCT 33.4 11/11/2014     No components found for: MCT  Lab Results   Component Value Date    MCV 90 02/23/2022    MCV 88 11/11/2014     Lab Results   Component Value Date    MCH 29.6 02/23/2022    MCH 28.3 11/11/2014     Lab Results   Component Value Date    MCHC 33.0 02/23/2022    MCHC 32.0 11/11/2014     Lab Results   Component Value Date    RDW 12.9 02/23/2022    RDW 14.2 11/11/2014     Lab Results   Component Value Date     02/23/2022     11/11/2014       Most recent EKG 6/15/2011QTc 397    Substance Use History:  Social History     Tobacco Use     Smoking status: Former Smoker     Packs/day: 0.00     Years: 0.00     Pack years: 0.00     Smokeless tobacco: Never Used     Tobacco comment: smoked from ages [age]   Vaping Use     Vaping Use: Never used   Substance Use Topics     Alcohol use: No     Comment: none due to being overeaters annonymous     Drug use: No   She reports that she used her roommates alprazolam in the past and feels she can easily become addicted.  Caffeine: One cup of coffee per day.  Family History:   Patient reported family history  includes:   Family History   Problem Relation Age of Onset     Hypertension Mother      Neurologic Disorder Mother         migranes     Lipids Mother      Breast Cancer Mother      Migraines Mother      Depression Father      Neurologic Disorder Father         MS     Musculoskeletal Disorder Father         MS     Obesity Father      Multiple Sclerosis Father      Depression Sister      Obesity Sister      Depression Brother      Allergies Brother      GI problems Brother      Depression Brother      GI problems Brother      C.A.D. Maternal Grandmother      Diabetes Maternal Grandmother      Circulatory Maternal Grandmother      Lipids Maternal Grandmother      Osteoporosis Maternal Grandmother      Breast Cancer Maternal Grandmother      C.A.D. Maternal Grandfather      Alcohol/Drug Maternal Grandfather      Depression Maternal Grandfather      C.A.D. Paternal Grandmother      Obesity Paternal Grandmother      Prostate Cancer Paternal Grandfather      Alcohol/Drug Paternal Grandfather      Obesity Paternal Grandfather      Other - See Comments Daughter      Depression Daughter      Anxiety Disorder Daughter      Eating Disorder Daughter      Multiple births Daughter      Other - See Comments Daughter      Multiple births Son      Other - See Comments Son      Attention Deficit Disorder Son      Neurologic Disorder Son         dyslexia     Breast Cancer Maternal Aunt      Eye Disorder No family hx of         self from graves disease, bulge and dryness     Thyroid Disease No family hx of         graves disease       Developmental History:  Not explored    Social History:   Per DA by Ibis Sierra, [facility]:  Patient reported they grew up in [place], [place].  They were raised by biological parents. Has 3 siblings.  Patient reported that   childhood was loving.  Patient denies experiencing childhood abuse/neglect. Patient described their current relationships with family of origin as father  in .  Pt shares that  "she has a relationship with her mother but that it \"isn't close\".       The patient has been  1 times and has 3 children.   She described the relationship with   spouse as, \"awesome.\" Patient reported having few good friends.     Patient reported   highest education level was graduate school. The patient did not serve in the .  Patient is currently employed full time and reports they are not able to function appropriately at work.. Can get to work but has a hard time functioning at work.  Has a hard time \"caring\" at work.  Works full time as a .    Patient reported that they have not been involved with the legal system.   Patient denies being on probation / parole / under the jurisdiction of the court.    Mental Status Examination:     Keena is a 42-year-old woman in no acute distress.  Speech is clear and normal in rate and tone.  Mood is depressed and anxious.  Thoughts are logical and spontaneous with no loose associations or flight of ideas.  Thought content shows no psychosis.  No suicidal thoughts.    Sensorium is clear.  She is oriented to person, place, and time.  Memory appears to be intact for immediate, recent, and remote events.  Intelligence is estimated to be high average.  Abstractive ability appears to be intact.  Attention and concentration were good during this interview.  Personal insight is good.  Personal and impersonal judgment appear to be intact.    Assessment and Plan:  Keena is a 42-year-old woman who reports having struggled with depression, anxiety, and binge eating for as long as she can remember.  She has had treatment for depression and anxiety for approximately 21 years.  She has a history of Graves' disease, and is on thyroid hormone replacement after having her thyroid surgically removed.  She also has longstanding tremor.      ICD-10-CM    1. Moderate episode of recurrent major depressive disorder (H)  F33.1 Adult Mental Health  Referral     " buPROPion (WELLBUTRIN XL) 300 MG 24 hr tablet   2. Anxiety  F41.9 busPIRone (BUSPAR) 30 MG tablet   3. Binge eating disorder  F50.81        Medical Comorbidities Include: See above    Patient Strengths:   Keena  identified the following strengths: commitment to health and well being, family support, intelligence and work ethic.     Treatment Plan:  Keena has been on bupropion for quite some time, and has found it to be beneficial.  She was started on buspirone in 2016, and initially found it to be very beneficial.  Unfortunately, her anxiety escalated even though she was taking 30 mg daily, so it was switched to sertraline.  Sertraline unfortunately exacerbated her tremor to the point that she was having a hard time doing simple tasks such as typing or drinking a glass of water.  She recently stopped sertraline and restarted buspirone.  We agreed to increase her dose of buspirone to 30 mg twice a day as below.    Other options for treatment were reviewed including adding an SNRI to her bupropion or trying an alternative medication such as lamotrigine.  She may actually benefit from pramipexole for treatment resistant depression, as it may also help with her tremor.  She is adamant that she does not want medications that cause weight gain or sexual side effects.    She has had therapy in the past and is open to pursuing therapy again.  Ibis will reach out and schedule a time to meet in between our appointments.    Patient Instructions   Increase buspirone to 15 mg in the morning and 30 mg at bedtime for one week, then increase to 30 mg twice daily.    Continue bupropion  mg daily.    Continue meditation practice.    Try to exercise 30 minutes 3 times per week.    Ibis will reach out regarding meeting prior to our next appointment.    Continue all other medications per your primary care provider.    Schedule an appointment with me in 6 weeks or sooner as needed.  You may call [facility] at [phone] to schedule.    [facility] Resources:      Go to the Emergency Department as needed or call after hours crisis line at [phone].      To schedule individual or family therapy, call [facility] at [phone].     Follow up with primary care provider as planned or sooner for acute medical concerns.    Call the psychiatric nurse line with medication questions or concerns at [phone].    [employer] may be used to communicate with your provider, but this is not intended to be used for emergencies.    Community Resources:      National Suicide Prevention Lifeline: 584.482.1811 (TTY: 871.718.5020). Call anytime for help.  (www.suicidepreventionlifeline.org)    National [place] on Mental Illness (www.purvi.org): 552.193.5430 or 241-746-7254.     Mental Health Association (www.mentalhealth.org): 422.617.8013 or 491-953-6537.    [place] Crisis Text Line: Text [place] to 907805    Suicide LifeLine Chat: suicidepreventionlifeline.org/chat       Patient Status:  Patient will continue to be seen for ongoing consultation and stabilization.    Keena was scheduled for a video appointment, but was vacationing with her family and the video option was not viable.  We agreed to meet by telephone.    Telephone call duration: 1 hour    Total time spent, including chart review and documentation: 90 minutes

## 2022-07-06 NOTE — TELEPHONE ENCOUNTER
Met with patient.  Please see today's note.    Stephany Neumann MD  Collaborative Care Psychiatry  [facility]

## 2022-07-06 NOTE — NURSING NOTE
MH&A Post-Appointment Chart -check      Correct pharmacy verified with patient and updated in chart? [x] yes []no    Medications ordered this visit were e-scribed.  Verified by order class [x] yes  [] no    List Medications:  Bupropion XL 300mg  Buspirone 30mg     Medication changes or discontinuations were communicated to patient's pharmacy: [] yes  [x] no    UA collected [] yes  [] no  [x] n/a-virtual     Outside referrals / labs, etc support staff to follow up: [] yes  [x] no    Future appointment was made: [] yes  [x] no  [] n/a    Dictation completed at time of chart check: [] yes  [x] no    I have checked the documentation for today s encounters and the above information has been reviewed and completed.      Anastasiia Red MA on July 6, 2022 at 12:31 PM

## 2022-07-06 NOTE — Clinical Note
Joss Pina,  I met with Keena today.  We agreed to increase her buspirone, as she said it was quite effective when it was initially started a few years ago.  It we may need to make other adjustments.  Its to that she did not tolerate either sertraline or propranolol, but we should be able to help her feel better.Please feel free to contact me with questions or concerns.  Thank you for the opportunity to be involved in the care of this patient.  Regards, Stephany Neumann MD Collaborative Care Psychiatry [facility]

## 2022-07-06 NOTE — PROGRESS NOTES
Collaborative Care Psychiatry Service  Provider Name: Ibis Sierra, [facility], [facility]    PATIENT'S NAME: Keena Tobias  PREFERRED NAME: Keena  PREFERRED PRONOUNS:    MRN:   [id number]  :   1980   ACCT. NUMBER: [id number]  DATE OF SERVICE: 22  START TIME: 7:16a  END TIME: 7:54a    BRIEF ADULT DIAGNOSTIC ASSESSMENT    Telemedicine Visit: The patient's condition can be safely assessed and treated via synchronous audio and visual telemedicine encounter.      Reason for Telemedicine Visit: Services only offered telehealth    Originating Site (Patient Location): Patient's home    Distant Site (Provider Location): Provider Remote Setting- Home Office    Consent:  The patient/guardian has verbally consented to: the potential risks and benefits of telemedicine (video visit) versus in person care; bill my insurance or make self-payment for services provided; and responsibility for payment of non-covered services.     Mode of Communication:  Video Conference via [employer]    As the provider I attest to compliance with applicable laws and regulations related to telemedicine.    Identifying Information:  Patient is a 42 year old, .  The pronoun use throughout this assessment reflects the patient's chosen pronoun.  Patient was referred for an assessment by primary care provider.  Patient attended the session with spouse in the same room.     Chief Complaint:   Pt shares that most of her life she has struggled with anxiety and depression. Can't remember a time when she didn't have them.  Started on meds in  with celexa and it didn't do anything.  Switched to wellbutrin which helped but has been on it for a long time with other medications being added in periodically.    Has an essential tremor since [employer] which has worsened the past year or so especially since starting on sertraline. Has been tapered off the sertraline just recently which pt feels has helped and started on buspar.     Also over the past year or so has noticed an increase in depression and anxiety.  Denies specific stressors.  Main symptoms are; lack of motivation and energy and having lot of anxiety where she gets stuck in negative thoughts.    Has recently seen a MTM provider and possible recommendations were made.    Denies any substance use as she has a hx of binge eating and is in [employer] and has a sponsor.  Has been in recovery for several years.  Lives with  and 3 kids.  Has a job as a  in the [employer] at [place].  Doesn't care for her job and feels that she is not performing adequately although she gets reports from work that she is doing great.       Does the client have any condition that is currently presenting as a potential to harm themselves or others (severe withdrawal, serious medical condition, severe emotional/behavioral problem)? No.  Proceed with assessment.    Review of Symptoms per patient report:  Depression: Lack of interest and Change in energy level  Nadia:  No Symptoms  Psychosis: No Symptoms  Anxiety: Excessive worry, Nervousness and Irritability, gets trapped in thoughts  Panic:  Palpitations, Tremors, Tingling, Numbness and Sense of impending doom- more often since medication change.  Post Traumatic Stress Disorder:  No Symptoms   Eating Disorder: Binging- recovery for a long time.  Follows a meal plan and has a sponsor. Has been doing this for 3 years.  ADD / ADHD:  No symptoms  Conduct Disorder: No symptoms  Autism Spectrum Disorder: No symptoms  Obsessive Compulsive Disorder: if anxious or depressed will do things obsessively. (read 3 books in 1 day, etc)    Sleep:  Falls asleep fast.  Usually stays asleep during the night.    Caffeine: coffee in the morning.  Tobacco:no    Current alcohol use: none, due to being part of [employer]    Current drug use: none    Rating Scales:  PHQ-9:   [facility] Follow-up to PHQ 6/10/2022 7/6/2022 7/6/2022   PHQ-9 9. Suicide  Ideation past 2 weeks Not at all Not at all Not at all      GAD7:    DAYANARA-7 SCORE 11/18/2021 6/10/2022 6/29/2022   Total Score - - -   Total Score 7 (mild anxiety) 21 (severe anxiety) 21 (severe anxiety)   Total Score 7 21 21     CGI:  First:  No data recorded  Most recent:  No data recorded    WHODAS:   WHODAS 2.0 Total Score 6/29/2022 6/29/2022   Total Score 35 35   Total Score MyChart - 35        CAGE:    CAGE-AID Flowsheet 5/14/2020 6/29/2022 6/29/2022   Have you ever felt you should Cut down on your drinking or drug use? 0 0 0   Have people Annoyed you by criticizing your drinking or drug use? 0 0 0   Have you ever felt bad or Guilty about your drinking or drug use? 0 0 0   Have you ever had a drink or used drugs first thing in the morning to steady your nerves or to get rid of a hangover? (Eye opener) 0 0 0   CAGE-AID SCORE 0 0 0   1. Have you felt you ought to cut down on your drinking or drug use? - - No   2. Have people annoyed you by criticizing your drinking or drug use? - - No   3. Have you felt bad or guilty about your drinking or drug use? - - No   4. Have you ever had a drink or used drugs first thing in the morning to steady your nerves or to get rid of a hangover (eye opener)? 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 Sanchez     Tremor 4/29/2022       Patient has received mental health services in the past: therapy in the past 8-10 years ago.  Psychiatry 15+ years ago..  Psychiatric Hospitalizations: None.  Patient denies a history of civil commitment. Currently, patient is not receiving other mental health services.  These include none.     Patient does not report a  history of head injury / trauma / cognitive impairment / seizures.      Current Medications:  Current Outpatient Medications   Medication Sig Dispense Refill     adapalene (DIFFERIN) 0.3 % external gel Apply topically At Bedtime       buPROPion (WELLBUTRIN XL) 300 MG 24 hr tablet Take 1 tablet (300 mg) by mouth every morning 30 tablet 1     busPIRone (BUSPAR) 15 MG tablet Take 1 tablet (15 mg) by mouth 2 times daily 60 tablet 1     clindamycin (CLEOCIN T) 1 % external lotion APPLY THIN LAYER TOPICALLY TO FACE EVERY MORNING AS NEEDED FOR FLARES       levothyroxine (SYNTHROID/LEVOTHROID) 175 MCG tablet Take 175 mcg by mouth every other day       levothyroxine (SYNTHROID/LEVOTHROID) 200 MCG tablet Take 200 mcg by mouth every other day       metroNIDAZOLE (METROCREAM) 0.75 % external cream Apply topically every morning       Multiple Vitamins-Minerals (CENTRUM ADULTS PO) Take 1 tablet by mouth daily       vitamin D3 (CHOLECALCIFEROL) 50 mcg (2000 units) tablet Take 2,000 Units by mouth daily          Allergies:  Allergies   Allergen Reactions     Allegra [Fexofenadine Hydrochloride]      Heart palpitations? sensitivity       Family Psychiatric History:  Patient did report a family history of mental health concerns.     Family History     Problem (# of Occurrences) Relation (Name,Age of Onset)    Alcohol/Drug (2) Maternal Grandfather (Grandpa Blake), Paternal Grandfather (Grandpa Melisa)    Allergies (1) Brother (emilyopher)    Anxiety Disorder (1) Daughter (harvinder)    Attention Deficit Disorder (1) Son (pretty)    Breast Cancer (3) Mother (Mom), Maternal Grandmother (Grandpa Blake), Maternal Aunt    C.A.D. (3) Maternal Grandmother (Grandpa Blake), Maternal Grandfather (Grandpa Blake), Paternal Grandmother (Grandma)    Circulatory (1) Maternal Grandmother (Grandpa Blake)    Depression (6) Father (Dad), Sister (maurisio), Brother (christopher), Brother (bianca), Maternal Grandfather (Grandpa Blake), Daughter (harvinder)  "   Diabetes (1) Maternal Grandmother (Grandbryan Lozano)    Eating Disorder (1) Daughter (harvinder)    GI problems (2) Brother (bob), Brother (bianca)    Hypertension (1) Mother (Mom)    Lipids (2) Mother (Mom), Maternal Grandmother (Grandbryan Lozano)    Migraines (1) Mother (Mom)    Multiple Sclerosis (1) Father (Dad)    Multiple births (2) Daughter (harvinder), Son (pretty)    Musculoskeletal Disorder (1) Father (Dad): MS    Neurologic Disorder (3) Mother (Mom): migranes, Father (Dad): MS, Son (pretty): dyslexia    Obesity (4) Father (Dad), Sister (maurisio), Paternal Grandmother (Grandma), Paternal Grandfather (Grandpa Melisa)    Osteoporosis (1) Maternal Grandmother (Grandbryan Lozano)    Other - See Comments (3) Daughter (harvinder), Daughter (saadia), Son (pretty)    Prostate Cancer (1) Paternal Grandfather (Grandpa Melisa)       Negative family history of: Eye Disorder, Thyroid Disease          Social/Family History:  Patient reported they grew up in [place], [place].  They were raised by biological parents. Has 3 siblings.  Patient reported that   childhood was loving.  Patient denies experiencing childhood abuse/neglect. Patient described their current relationships with family of origin as father  in .  Pt shares that she has a relationship with her mother but that it \"isn't close\".      The patient has been  1 times and has 3 children.   She described the relationship with   spouse as, \"awesome.\" Patient reported having few good friends.     Cultural influences and impact on patient's life structure, values, norms, and healthcare: pt denies. Patient identified their preferred language to be English. Patient reported they does not need the assistance of an  or other support involved in treatment.       Educational/Occupational History:  Patient reported   highest education level was graduate school. The patient did not serve in the .  Patient is currently employed full time and reports they " "are not able to function appropriately at work.. Can get to work but has a hard time functioning at work.  Has a hard time \"caring\" at work.  Works full time as a .      Social History     Socioeconomic History     Marital status:      Spouse name: Not on file     Number of children: 2     Years of education: Not on file     Highest education level: Not on file   Occupational History     Occupation: student     Employer: NONE    Tobacco Use     Smoking status: Former Smoker     Packs/day: 0.00     Years: 0.00     Pack years: 0.00     Smokeless tobacco: Never Used     Tobacco comment: smoked from ages [age]   Vaping Use     Vaping Use: Never used   Substance and Sexual Activity     Alcohol use: No     Drug use: No     Sexual activity: Yes     Partners: Male     Comment: spouse is sterile   Other Topics Concern     Parent/sibling w/ CABG, MI or angioplasty before 65F 55M? Yes   Social History Narrative    Caffeine intake/servings daily - 0    Calcium intake/servings daily - 3    Exercise 7 times weekly - describe walks daily    Sunscreen used - Yes    Seatbelts used - No    Guns stored in the home - No    Self Breast Exam - Yes    Pap test up to date -  Yes    Eye exam up to date -  No    Dental exam up to date -  Yes    DEXA scan up to date -  No    Flex Sig/Colonoscopy up to date -  No    Mammography up to date -  No    Immunizations reviewed and up to date - Yes    Abuse: Current or Past (Physical, Sexual or Emotional) - No    Do you feel safe in your environment - Yes    Do you cope well with stress - Yes and No    Do you suffer from insomnia - No    Last updated by: Taylor Ramos  4/23/2014         Social Determinants of Health     Financial Resource Strain: Not on file   Food Insecurity: Not on file   Transportation Needs: Not on file   Physical Activity: Not on file   Stress: Not on file   Social Connections: Not on file   Intimate Partner Violence: Not on file   Housing Stability: " Not on file       Patient reported that they have not been involved with the legal system.   Patient denies being on probation / parole / under the jurisdiction of the court.    Current Mental Status Exam:   Appearance:   Appropriate    Eye Contact:   Good   Psychomotor:   Normal   Attitude / Demeanor:  Cooperative   Speech      Rate / Production:  Normal/ Responsive      Volume:   Normal  volume      Language:   intact  Mood:    Normal  Affect:    Appropriate    Thought Content:  Clear   Thought Process:  Coherent       Associations:  No loosening of associations  Insight:    Good   Judgment:   Intact   Orientation:   All  Attention/concentration: Good      Safety Assessment:   Current Safety Concerns:  [place] Suicide Severity Rating Scale (Lifetime/Recent)  [place] Suicide Severity Rating (Lifetime/Recent) 4/16/2020 4/16/2020   Wish to be Dead (Lifetime) Yes Yes   Comments What if I crashed in to the divider in the highway, car accident at 70 mph. Wouldn't it be nice not to be here.   Non-Specific Active Suicidal Thoughts (Lifetime) Yes Yes   Non-Specific Active Suicidal Thought Description (Lifetime) Driving in to the highway median. Wouldn't it be nice not to be here.   Most Severe Ideation Rating (Lifetime) - 2   Most Severe Ideation Description (Lifetime) - Driving in to the highway median.   Frequency (Lifetime) - NA   Duration (Lifetime) - NA   Controllability (Lifetime) - NA   Protective Factors  (Lifetime) - 2   Reasons for Ideation (Lifetime) - 4   RETIRED: 1. Wish to be Dead (Recent) Yes Yes   RETIRED: Wish to be Dead Description (Recent) Wouldn't it be nice not to wake up or be here. Wouldn't it be nice not to be here.   RETIRED: 2. Non-Specific Active Suicidal Thoughts (Recent) No Yes   Non-Specific Active Suicidal Thought Description (Recent) - Wouldn't it be nice not to be here.   3. Active Suicidal Ideation with any Methods (Not Plan) Without Intent to Act (Lifetime) - Yes   RETIRE: Active  Suicidal Ideation with any Methods (Not Plan) Description (Lifetime) - Driving in to the highway median.   RETIRED: 3. Active Suicidal Ideation with any Methods (Not Plan) Without Intent to Act (Recent) - Yes   RETIRED: Active Suicidal Ideation with any Methods (Not Plan) Description (Recent) - No specific ideas or plan   RETIRE: 4. Active Suicidal Ideation with Some Intent to Act, Without Specific Plan (Lifetime) - No   4. Active Suicidal Ideation with Some Intent to Act, Without Specific Plan (Recent) - No   RETIRE: 5. Active Suicidal Ideation with Specific Plan and Intent (Lifetime) - No   RETIRED: 5. Active Suicidal Ideation with Specific Plan and Intent (Recent) - No   Most Severe Ideation Rating (Past Month) - 2   Most Severe Ideation Description (Past Month) - Wouldn't it be nice not to be here.   Frequency (Past Month) - 1   Duration (Past Month) - 1   Controllability (Past Month) - 2   Protective Factors (Past Month) - 1   Reasons for Ideation (Past Month) - 0   Actual Attempt (Lifetime) - No   Actual Attempt (Past 3 Months) - No   Has subject engaged in non-suicidal self-injurious behavior? (Lifetime) - No   Has subject engaged in non-suicidal self-injurious behavior? (Past 3 Months) - No   Interrupted Attempts (Lifetime) - No   Interrupted Attempts (Past 3 Months) - No   Aborted or Self-Interrupted Attempt (Lifetime) - No   Aborted or Self-Interrupted Attempt (Past 3 Months) - No   Preparatory Acts or Behavior (Lifetime) - No   Preparatory Acts or Behavior (Past 3 Months) - No   Most Recent Attempt Actual Lethality Code - NA   Most Lethal Attempt Actual Lethality Code - NA   Initial/First Attempt Actual Lethality Code - NA     Patient denies current homicidal ideation and behaviors.  Patient denies current self-injurious ideation and behaviors.    Patient denied risk behaviors associated with substance use.  Patient denies any high risk behaviors associated with mental health symptoms.  Patient reports  the following current concerns for their personal safety: None.  Patient reports there no firearms in the house. There are no firearms in the home..     History of Safety Concerns:  Patient denied a history of homicidal ideation.     Patient denied a history of personal safety concerns.    Patient denied a history of assaultive behaviors.    Patient denied a history of sexual assault behaviors.     Patient denied a history of risk behaviors associated with substance use.  Patient denies any history of high risk behaviors associated with mental health symptoms.  Patient reports the following protective factors: positive relationships positive family connections, dedication to family/friends, safe and stable environment, abstinence from substances, adherence with prescribed medication, living with other people and daily obligations    Risk Plan:  See Preliminary Treatment Plan for Safety and Risk Management Plan    Diagnosis:  Diagnostic Criteria:   Unspecified Anxiety Disorder , Symptoms characteristic of an anxiety disorder that caused clinically significant distress or impairment in social, occupational, or other important areas of functioning predominate but do not meet the full criteria for any of the disorders of the anxiety disorders diagnostic class. Major Depressive Disorder  CRITERIA (A-C) REPRESENT A MAJOR DEPRESSIVE EPISODE - SELECT THESE CRITERIA  A) Recurrent episode(s) - symptoms have been present during the same 2-week period and represent a change from previous functioning 5 or more symptoms (required for diagnosis)   - Depressed mood. Note: In children and adolescents, can be irritable mood.     - Diminished interest or pleasure in all, or almost all, activities.    - Psychomotor activity retardation.    - Fatigue or loss of energy.    - Diminished ability to think or concentrate, or indecisiveness.   B) The symptoms cause clinically significant distress or impairment in social, occupational, or  other important areas of functioning  C) The episode is not attributable to the physiological effects of a substance or to another medical condition  D) The occurence of major depressive episode is not better explained by other thought / psychotic disorders  E) There has never been a manic episode or hypomanic episode    Diagnoses:  1. Moderate episode of recurrent major depressive disorder (H)    2. Anxiety disorder, unspecified type    3. Binge eating disorder        Patient's Strengths and Limitations:  Patient identified the following strengths or resources that will help them succeed in treatment: family support, insight, intelligence and sober support group / recovery support . Things that may interfere with the patient's success in treatment include: none identified.     Recommendations:     1. Plan for Safety and Risk Management:Recommended that patient call 911 or go to the local ED should there be a change in any of these risk factors..  Report to child / adult protection services was n/a.      2. Resources/Service Plan:       services are not indicated.     Modifications to assist communication are not indicated.     Additional disability accommodations are not indicated.      3. Collaboration:  Collaboration / coordination of treatment will be initiated with the following support professionals: psychiatry.      4.  Referrals:   The following referral(s) will be initiated: [facility].       Staff Name/Credentials:  Ibis Sierra, JOI, [facility], [facility]    July 6, 2022

## 2022-07-23 ENCOUNTER — LAB (OUTPATIENT)
Dept: LAB | Facility: HOSPITAL | Age: 42
End: 2022-07-23
Payer: COMMERCIAL

## 2022-07-23 DIAGNOSIS — E05.00 BASEDOW'S DISEASE: ICD-10-CM

## 2022-07-23 DIAGNOSIS — E89.0 POSTSURGICAL HYPOTHYROIDISM: Primary | ICD-10-CM

## 2022-07-23 LAB
T4 FREE SERPL-MCNC: 1.52 NG/DL (ref 0.9–1.7)
TSH SERPL DL<=0.005 MIU/L-ACNC: 0.05 UIU/ML (ref 0.3–5)

## 2022-07-23 PROCEDURE — 36415 COLL VENOUS BLD VENIPUNCTURE: CPT

## 2022-07-23 PROCEDURE — 84443 ASSAY THYROID STIM HORMONE: CPT

## 2022-07-23 PROCEDURE — 84439 ASSAY OF FREE THYROXINE: CPT

## 2022-08-14 ENCOUNTER — HOSPITAL ENCOUNTER (EMERGENCY)
Facility: CLINIC | Age: 42
Discharge: HOME | End: 2022-08-14
Attending: EMERGENCY MEDICINE | Admitting: EMERGENCY MEDICINE
Payer: COMMERCIAL

## 2022-08-14 ENCOUNTER — APPOINTMENT (OUTPATIENT)
Dept: GENERAL RADIOLOGY | Facility: CLINIC | Age: 42
End: 2022-08-14
Attending: EMERGENCY MEDICINE
Payer: COMMERCIAL

## 2022-08-14 VITALS
OXYGEN SATURATION: 96 % | HEART RATE: 71 BPM | HEIGHT: 67 IN | TEMPERATURE: 98.5 F | BODY MASS INDEX: 31.79 KG/M2 | RESPIRATION RATE: 18 BRPM | DIASTOLIC BLOOD PRESSURE: 67 MMHG | SYSTOLIC BLOOD PRESSURE: 96 MMHG

## 2022-08-14 DIAGNOSIS — U07.1 INFECTION DUE TO 2019 NOVEL CORONAVIRUS: ICD-10-CM

## 2022-08-14 LAB
ALBUMIN SERPL BCG-MCNC: 3.6 G/DL (ref 3.5–5.2)
ALBUMIN UR-MCNC: NEGATIVE MG/DL
ALP SERPL-CCNC: 49 U/L (ref 35–104)
ALT SERPL W P-5'-P-CCNC: 13 U/L (ref 10–35)
ANION GAP SERPL CALCULATED.3IONS-SCNC: 9 MMOL/L (ref 7–15)
APPEARANCE UR: CLEAR
AST SERPL W P-5'-P-CCNC: 22 U/L (ref 10–35)
BASOPHILS # BLD AUTO: 0 10E3/UL (ref 0–0.2)
BASOPHILS NFR BLD AUTO: 1 %
BILIRUB SERPL-MCNC: <0.2 MG/DL
BILIRUB UR QL STRIP: NEGATIVE
BUN SERPL-MCNC: 8.8 MG/DL (ref 6–20)
CA-I BLD-MCNC: 4.2 MG/DL (ref 4.4–5.2)
CALCIUM SERPL-MCNC: 7.8 MG/DL (ref 8.6–10)
CHLORIDE SERPL-SCNC: 108 MMOL/L (ref 98–107)
COLOR UR AUTO: ABNORMAL
CREAT SERPL-MCNC: 0.78 MG/DL (ref 0.51–0.95)
DEPRECATED HCO3 PLAS-SCNC: 22 MMOL/L (ref 22–29)
EOSINOPHIL # BLD AUTO: 0.1 10E3/UL (ref 0–0.7)
EOSINOPHIL NFR BLD AUTO: 2 %
ERYTHROCYTE [DISTWIDTH] IN BLOOD BY AUTOMATED COUNT: 13.1 % (ref 10–15)
FLUAV RNA SPEC QL NAA+PROBE: NEGATIVE
FLUBV RNA RESP QL NAA+PROBE: NEGATIVE
GFR SERPL CREATININE-BSD FRML MDRD: >90 ML/MIN/1.73M2
GLUCOSE SERPL-MCNC: 86 MG/DL (ref 70–99)
GLUCOSE UR STRIP-MCNC: NEGATIVE MG/DL
HCG INTACT+B SERPL-ACNC: <1 MIU/ML
HCT VFR BLD AUTO: 43.9 % (ref 35–47)
HGB BLD-MCNC: 14.4 G/DL (ref 11.7–15.7)
HGB UR QL STRIP: ABNORMAL
HOLD SPECIMEN: NORMAL
HOLD SPECIMEN: NORMAL
IMM GRANULOCYTES # BLD: 0 10E3/UL
IMM GRANULOCYTES NFR BLD: 0 %
KETONES UR STRIP-MCNC: 20 MG/DL
LACTATE SERPL-SCNC: 0.7 MMOL/L (ref 0.7–2)
LEUKOCYTE ESTERASE UR QL STRIP: NEGATIVE
LYMPHOCYTES # BLD AUTO: 0.7 10E3/UL (ref 0.8–5.3)
LYMPHOCYTES NFR BLD AUTO: 19 %
MAGNESIUM SERPL-MCNC: 2 MG/DL (ref 1.7–2.3)
MCH RBC QN AUTO: 29.2 PG (ref 26.5–33)
MCHC RBC AUTO-ENTMCNC: 32.8 G/DL (ref 31.5–36.5)
MCV RBC AUTO: 89 FL (ref 78–100)
MONOCYTES # BLD AUTO: 0.6 10E3/UL (ref 0–1.3)
MONOCYTES NFR BLD AUTO: 17 %
MUCOUS THREADS #/AREA URNS LPF: PRESENT /LPF
NEUTROPHILS # BLD AUTO: 2.2 10E3/UL (ref 1.6–8.3)
NEUTROPHILS NFR BLD AUTO: 61 %
NITRATE UR QL: NEGATIVE
NRBC # BLD AUTO: 0 10E3/UL
NRBC BLD AUTO-RTO: 0 /100
PH UR STRIP: 6 [PH] (ref 5–7)
PLATELET # BLD AUTO: 206 10E3/UL (ref 150–450)
POTASSIUM SERPL-SCNC: 3.9 MMOL/L (ref 3.4–5.3)
PROT SERPL-MCNC: 6.1 G/DL (ref 6.4–8.3)
RBC # BLD AUTO: 4.93 10E6/UL (ref 3.8–5.2)
RBC URINE: 2 /HPF
RSV RNA SPEC NAA+PROBE: NEGATIVE
SARS-COV-2 RNA RESP QL NAA+PROBE: POSITIVE
SODIUM SERPL-SCNC: 139 MMOL/L (ref 136–145)
SP GR UR STRIP: 1.02 (ref 1–1.03)
SQUAMOUS EPITHELIAL: <1 /HPF
TROPONIN T SERPL HS-MCNC: <6 NG/L
TSH SERPL DL<=0.005 MIU/L-ACNC: 0.39 UIU/ML (ref 0.3–4.2)
UROBILINOGEN UR STRIP-MCNC: NORMAL MG/DL
WBC # BLD AUTO: 3.7 10E3/UL (ref 4–11)
WBC URINE: <1 /HPF

## 2022-08-14 PROCEDURE — 96360 HYDRATION IV INFUSION INIT: CPT

## 2022-08-14 PROCEDURE — 84443 ASSAY THYROID STIM HORMONE: CPT | Performed by: EMERGENCY MEDICINE

## 2022-08-14 PROCEDURE — 87637 SARSCOV2&INF A&B&RSV AMP PRB: CPT | Performed by: EMERGENCY MEDICINE

## 2022-08-14 PROCEDURE — 80053 COMPREHEN METABOLIC PANEL: CPT | Performed by: EMERGENCY MEDICINE

## 2022-08-14 PROCEDURE — 71045 X-RAY EXAM CHEST 1 VIEW: CPT

## 2022-08-14 PROCEDURE — 36415 COLL VENOUS BLD VENIPUNCTURE: CPT | Performed by: EMERGENCY MEDICINE

## 2022-08-14 PROCEDURE — 99285 EMERGENCY DEPT VISIT HI MDM: CPT | Mod: 25

## 2022-08-14 PROCEDURE — 84484 ASSAY OF TROPONIN QUANT: CPT | Performed by: EMERGENCY MEDICINE

## 2022-08-14 PROCEDURE — 85025 COMPLETE CBC W/AUTO DIFF WBC: CPT | Performed by: EMERGENCY MEDICINE

## 2022-08-14 PROCEDURE — 71045 X-RAY EXAM CHEST 1 VIEW: CPT | Mod: 26 | Performed by: RADIOLOGY

## 2022-08-14 PROCEDURE — 93005 ELECTROCARDIOGRAM TRACING: CPT

## 2022-08-14 PROCEDURE — 82330 ASSAY OF CALCIUM: CPT | Performed by: EMERGENCY MEDICINE

## 2022-08-14 PROCEDURE — 96361 HYDRATE IV INFUSION ADD-ON: CPT

## 2022-08-14 PROCEDURE — 83735 ASSAY OF MAGNESIUM: CPT | Performed by: EMERGENCY MEDICINE

## 2022-08-14 PROCEDURE — 83605 ASSAY OF LACTIC ACID: CPT | Performed by: EMERGENCY MEDICINE

## 2022-08-14 PROCEDURE — 84702 CHORIONIC GONADOTROPIN TEST: CPT | Performed by: EMERGENCY MEDICINE

## 2022-08-14 PROCEDURE — 81001 URINALYSIS AUTO W/SCOPE: CPT | Performed by: EMERGENCY MEDICINE

## 2022-08-14 PROCEDURE — 258N000003 HC RX IP 258 OP 636: Performed by: EMERGENCY MEDICINE

## 2022-08-14 PROCEDURE — C9803 HOPD COVID-19 SPEC COLLECT: HCPCS

## 2022-08-14 RX ADMIN — SODIUM CHLORIDE 1000 ML: 9 INJECTION, SOLUTION INTRAVENOUS at 11:49

## 2022-08-14 ASSESSMENT — ACTIVITIES OF DAILY LIVING (ADL)
ADLS_ACUITY_SCORE: 37

## 2022-08-14 ASSESSMENT — ENCOUNTER SYMPTOMS
SHORTNESS OF BREATH: 0
NECK PAIN: 0
HEADACHES: 0
SORE THROAT: 0
BACK PAIN: 0
VOMITING: 0
DYSURIA: 0
EYE REDNESS: 0
COUGH: 0
ABDOMINAL PAIN: 0
SLEEP DISTURBANCE: 0
FEVER: 0
FATIGUE: 1
DIFFICULTY URINATING: 0
WEAKNESS: 1
NAUSEA: 0

## 2022-08-14 NOTE — ED TRIAGE NOTES
Tested + Covid. This morning EMS responded when pt was unresponsive. Pt lethargic and has a difficult time remembering when asked questions in regards to this morning.     Triage Assessment     Row Name 08/14/22 1125       Triage Assessment (Adult)    Airway WDL WDL       Respiratory WDL    Respiratory WDL WDL

## 2022-08-14 NOTE — ED PROVIDER NOTES
"ED Provider Note  [facility]      History     Chief Complaint   Patient presents with     Fatigue     Covid +     HPI  Keena Tobias is a 42 year old female who presents to the emergency department for evaluation of altered mental status.  The patient tested positive for COVID yesterday.  She has had symptoms of COVID for the past 4 days.  This morning, the patient was fatigued.  She states that she feels like a \"wet noodle.\"  Apparently this morning, the patient was difficult to arouse and her  called 911.  EMS found her to be normotensive.  Glucose level is normal.  The patient states that she has had symptoms for the past 4 days.  She states that she has had fatigue, generalized myalgias, occasional cough, and one episode of diarrhea.  She denies any chest pain or dyspnea.  No headache.  No focal weakness or numbness.  Patient denies any abdominal pain.  No nausea or vomiting.  She denies any dysuria, urgency, or frequency.  She is COVID vaccinated.  Her  also has COVID currently.    Past Medical History  Past Medical History:   Diagnosis Date     Major depressive disorder, recurrent, mild (H) 2008    on wellbutrin     Thyrotoxicosis without mention of goiter or other cause, without mention of thyrotoxic crisis or storm     [place] - Dr. Sanchez     Tremor 2022     Past Surgical History:   Procedure Laterality Date     C/SECTION, LOW TRANSVERSE  11/3/10    , Low Transverse      SECTION N/A 2014    Procedure:  SECTION;  Surgeon: Rubina Birmingham MD;  Location: [facility]     [facility] APPENDECTOMY       adapalene (DIFFERIN) 0.3 % external gel  buPROPion (WELLBUTRIN XL) 300 MG 24 hr tablet  busPIRone (BUSPAR) 30 MG tablet  clindamycin (CLEOCIN T) 1 % external lotion  levothyroxine (SYNTHROID/LEVOTHROID) 175 MCG tablet  levothyroxine (SYNTHROID/LEVOTHROID) 200 MCG tablet  metroNIDAZOLE (METROCREAM) 0.75 % external " cream  vitamin D3 (CHOLECALCIFEROL) 50 mcg (2000 units) tablet      Allergies   Allergen Reactions     Allegra [Fexofenadine Hydrochloride]      Heart palpitations? sensitivity     Family History  Family History   Problem Relation Age of Onset     Hypertension Mother      Neurologic Disorder Mother         migranes     Lipids Mother      Breast Cancer Mother      Migraines Mother      Depression Father      Neurologic Disorder Father         MS     Musculoskeletal Disorder Father         MS     Obesity Father      Multiple Sclerosis Father      Depression Sister      Obesity Sister      Depression Brother      Allergies Brother      GI problems Brother      Depression Brother      GI problems Brother      C.A.D. Maternal Grandmother      Diabetes Maternal Grandmother      Circulatory Maternal Grandmother      Lipids Maternal Grandmother      Osteoporosis Maternal Grandmother      Breast Cancer Maternal Grandmother      C.A.D. Maternal Grandfather      Alcohol/Drug Maternal Grandfather      Depression Maternal Grandfather      C.A.D. Paternal Grandmother      Obesity Paternal Grandmother      Prostate Cancer Paternal Grandfather      Alcohol/Drug Paternal Grandfather      Obesity Paternal Grandfather      Other - See Comments Daughter      Depression Daughter      Anxiety Disorder Daughter      Eating Disorder Daughter      Multiple births Daughter      Other - See Comments Daughter      Multiple births Son      Other - See Comments Son      Attention Deficit Disorder Son      Neurologic Disorder Son         dyslexia     Breast Cancer Maternal Aunt      Eye Disorder No family hx of         self from graves disease, bulge and dryness     Thyroid Disease No family hx of         graves disease     Social History   Social History     Tobacco Use     Smoking status: Former Smoker     Packs/day: 0.00     Years: 0.00     Pack years: 0.00     Smokeless tobacco: Never Used     Tobacco comment: smoked from ages [age]   Vaping  "Use     Vaping Use: Never used   Substance Use Topics     Alcohol use: No     Comment: none due to being overeaters annonymous     Drug use: No      Past medical history, past surgical history, medications, allergies, family history, and social history were reviewed with the patient. No additional pertinent items.       Review of Systems   Constitutional: Positive for fatigue. Negative for fever.   HENT: Negative for sore throat.    Eyes: Negative for redness.   Respiratory: Negative for cough and shortness of breath.    Cardiovascular: Negative for chest pain.   Gastrointestinal: Negative for abdominal pain, nausea and vomiting.   Genitourinary: Negative for difficulty urinating and dysuria.   Musculoskeletal: Negative for back pain and neck pain.   Skin: Negative for rash.   Neurological: Positive for weakness. Negative for headaches.   Psychiatric/Behavioral: Negative for sleep disturbance.   All other systems reviewed and are negative.    A complete review of systems was performed with pertinent positives and negatives noted in the HPI, and all other systems negative.    Physical Exam   BP: 108/69  Pulse: 58  Temp: 98.3  F (36.8  C)  Resp: 18  Height: 170.2 cm (5' 7\")  SpO2: 97 %  Physical Exam  Vitals and nursing note reviewed.   Constitutional:       General: She is not in acute distress.     Appearance: Normal appearance. She is ill-appearing. She is not diaphoretic.      Comments: Appears fatigued   HENT:      Head: Normocephalic and atraumatic.      Mouth/Throat:      Pharynx: No oropharyngeal exudate.   Eyes:      General: No scleral icterus.     Pupils: Pupils are equal, round, and reactive to light.   Cardiovascular:      Rate and Rhythm: Normal rate and regular rhythm.      Pulses: Normal pulses.      Heart sounds: Normal heart sounds.   Pulmonary:      Effort: No respiratory distress.      Breath sounds: Normal breath sounds.   Abdominal:      General: Bowel sounds are normal.      Palpations: Abdomen " is soft.      Tenderness: There is no abdominal tenderness.   Musculoskeletal:         General: No tenderness. Normal range of motion.   Skin:     General: Skin is warm.      Capillary Refill: Capillary refill takes less than 2 seconds.      Findings: No rash.   Neurological:      General: No focal deficit present.      Mental Status: She is alert.      Cranial Nerves: No cranial nerve deficit.      Motor: No weakness.      Coordination: Coordination normal.         ED Course      Procedures       The medical record was reviewed and interpreted.  Current labs reviewed and interpreted.  EKG reviewed and interpreted: Sinus bradycardia.  No ischemic change..          EKG Interpretation:      Interpreted by RAMÓN WU MD, MD  Time reviewed:1200   Symptoms at time of EKG: Generalized weakness  Rhythm: Sinus bradycardia  Rate: 58  Axis: Normal  Ectopy: None  Conduction: Normal  ST Segments/ T Waves: No ST-T wave changes and No acute ischemic changes.  Poor R wave progression  Q Waves: None  Comparison to prior: Poor R wave compression compared to 2/27/2011    Clinical Impression: non-specific EKG       Results for orders placed or performed during the hospital encounter of 08/14/22   XR Chest Port 1 View     Status: None    Narrative    EXAM: XR CHEST PORT 1 VIEW  8/14/2022 12:12 PM     HISTORY:  cough       COMPARISON:  None    FINDINGS:   Frontal radiograph of the chest. Trachea is midline. Cardiac  silhouette is within normal limits. No airspace opacities. No pleural  effusion or pneumothorax. Osseous structures are within normal limits.      Impression    IMPRESSION:   No acute airspace disease.    I have personally reviewed the examination and initial interpretation  and I agree with the findings.    KAVON MARTINO MD         SYSTEM ID:  [id number]   Comprehensive metabolic panel     Status: Abnormal   Result Value Ref Range    Sodium 139 136 - 145 mmol/L    Potassium 3.9 3.4 - 5.3 mmol/L    Creatinine 0.78 0.51 -  0.95 mg/dL    Urea Nitrogen 8.8 6.0 - 20.0 mg/dL    Chloride 108 (H) 98 - 107 mmol/L    Carbon Dioxide (CO2) 22 22 - 29 mmol/L    Anion Gap 9 7 - 15 mmol/L    Glucose 86 70 - 99 mg/dL    Calcium 7.8 (L) 8.6 - 10.0 mg/dL    Protein Total 6.1 (L) 6.4 - 8.3 g/dL    Albumin 3.6 3.5 - 5.2 g/dL    Bilirubin Total <0.2 <=1.2 mg/dL    Alkaline Phosphatase 49 35 - 104 U/L    AST 22 10 - 35 U/L    ALT 13 10 - 35 U/L    GFR Estimate >90 >60 mL/min/1.73m2   Symptomatic; Yes; 8/13/2022 Influenza A/B & SARS-CoV2 (COVID-19) Virus PCR Multiplex Nasopharyngeal     Status: Abnormal    Specimen: Nasopharyngeal; Swab   Result Value Ref Range    Influenza A PCR Negative Negative    Influenza B PCR Negative Negative    RSV PCR Negative Negative    SARS CoV2 PCR Positive (A) Negative    Narrative    Testing was performed using the Xpert Xpress CoV2/Flu/RSV Assay on the [employer] Instrument. This test should be ordered for the detection of SARS-CoV-2 and influenza viruses in individuals who meet clinical and/or epidemiological criteria. Test performance is unknown in asymptomatic patients. This test is for in vitro diagnostic use under the FDA EUA for laboratories certified under CLIA to perform high or moderate complexity testing. This test has not been FDA cleared or approved. A negative result does not rule out the presence of PCR inhibitors in the specimen or target RNA in concentration below the limit of detection for the assay. If only one viral target is positive but coinfection with multiple targets is suspected, the sample should be re-tested with another FDA cleared, approved, or authorized test, if coinfection would change clinical management. This test was validated by the [facility]. These laboratories are certified under the Clinical  Laboratory Improvement Amendments of 1988 (CLIA-88) as qualified to perform high complexity laboratory testing.   [place] Draw     Status: None    Narrative    The  following orders were created for panel order [place] Draw.  Procedure                               Abnormality         Status                     ---------                               -----------         ------                     Extra Blue Top Tube[[id number]]                              Final result               Extra Red Top Tube[[id number]]                               Final result                 Please view results for these tests on the individual orders.   CBC with platelets and differential     Status: Abnormal   Result Value Ref Range    WBC Count 3.7 (L) 4.0 - 11.0 10e3/uL    RBC Count 4.93 3.80 - 5.20 10e6/uL    Hemoglobin 14.4 11.7 - 15.7 g/dL    Hematocrit 43.9 35.0 - 47.0 %    MCV 89 78 - 100 fL    MCH 29.2 26.5 - 33.0 pg    MCHC 32.8 31.5 - 36.5 g/dL    RDW 13.1 10.0 - 15.0 %    Platelet Count 206 150 - 450 10e3/uL    % Neutrophils 61 %    % Lymphocytes 19 %    % Monocytes 17 %    % Eosinophils 2 %    % Basophils 1 %    % Immature Granulocytes 0 %    NRBCs per 100 WBC 0 <1 /100    Absolute Neutrophils 2.2 1.6 - 8.3 10e3/uL    Absolute Lymphocytes 0.7 (L) 0.8 - 5.3 10e3/uL    Absolute Monocytes 0.6 0.0 - 1.3 10e3/uL    Absolute Eosinophils 0.1 0.0 - 0.7 10e3/uL    Absolute Basophils 0.0 0.0 - 0.2 10e3/uL    Absolute Immature Granulocytes 0.0 <=0.4 10e3/uL    Absolute NRBCs 0.0 10e3/uL   Extra Blue Top Tube     Status: None   Result Value Ref Range    Hold Specimen JIC    Extra Red Top Tube     Status: None   Result Value Ref Range    Hold Specimen JIC    Lactic acid whole blood     Status: Normal   Result Value Ref Range    Lactic Acid 0.7 0.7 - 2.0 mmol/L   HCG quantitative pregnancy     Status: Normal   Result Value Ref Range    hCG Quantitative <1 <5 mIU/mL   TSH with free T4 reflex     Status: Normal   Result Value Ref Range    TSH 0.39 0.30 - 4.20 uIU/mL   Troponin T, High Sensitivity     Status: Normal   Result Value Ref Range    Troponin T, High Sensitivity <6 <=14 ng/L   Ionized Calcium      Status: Abnormal   Result Value Ref Range    Calcium Ionized 4.2 (L) 4.4 - 5.2 mg/dL   Magnesium     Status: Normal   Result Value Ref Range    Magnesium 2.0 1.7 - 2.3 mg/dL   EKG 12-lead, tracing only     Status: None (Preliminary result)   Result Value Ref Range    Systolic Blood Pressure  mmHg    Diastolic Blood Pressure  mmHg    Ventricular Rate 58 BPM    Atrial Rate 58 BPM    WA Interval 164 ms    QRS Duration 82 ms     ms    QTc 410 ms    P Axis 33 degrees    R AXIS -7 degrees    T Axis 20 degrees    Interpretation ECG       Sinus bradycardia  Low voltage QRS  Cannot rule out Anterior infarct , age undetermined  Abnormal ECG     CBC with platelets differential     Status: Abnormal    Narrative    The following orders were created for panel order CBC with platelets differential.  Procedure                               Abnormality         Status                     ---------                               -----------         ------                     CBC with platelets and d...[[id number]]  Abnormal            Final result                 Please view results for these tests on the individual orders.     Medications   0.9% sodium chloride BOLUS (1,000 mLs Intravenous Started 8/14/22 1149)     2:18 PM Appears markedly improved.  Patient sitting up in bed.  Awake, alert, and conversant.  Drinking water.     Assessments & Plan (with Medical Decision Making)   42 year old female to the emergency department with COVID-19 and decreased mental status.  She was difficult to arouse this morning.  She was generally weak and fatigued appearing on my initial evaluation but with normal vital signs.  She is able to wake up and converse coherently.  She has a nonfocal neurologic examination.  Patient was given IV fluids.  Labs were unremarkable.  EKG and troponin normal so do not suspect ACS.  Chest radiograph without infiltrates.  Normal oxygen saturations.  Normal electrolytes.  Urinalysis pending.  Patient does not  meet criteria for Paxlovid.  Will plan discharged home after UA.    I have reviewed the nursing notes. I have reviewed the findings, diagnosis, plan and need for follow up with the patient.    New Prescriptions    No medications on file       Final diagnoses:   Infection due to 2019 novel coronavirus     Chart documentation was completed with Dragon voice-recognition software. Even though reviewed, this chart may still contain some grammatical, spelling, and word errors.     --  Cristoabl Phelps Md  [facility] EMERGENCY DEPARTMENT  8/14/2022     Cristobal Phelps MD  08/14/22 1522

## 2022-08-14 NOTE — DISCHARGE INSTRUCTIONS
Drink plenty of fluids.  Eat regular meals.  Take a calcium supplement such as Tums: 2 pills 2-3 times per day.  Quarantine/self isolate per enclosed instructions.    Return to the emergency department if severe difficulty breathing or other concerns.

## 2022-08-15 ENCOUNTER — VIRTUAL VISIT (OUTPATIENT)
Dept: FAMILY MEDICINE | Facility: CLINIC | Age: 42
End: 2022-08-15
Payer: COMMERCIAL

## 2022-08-15 DIAGNOSIS — U07.1 INFECTION DUE TO 2019 NOVEL CORONAVIRUS: Primary | ICD-10-CM

## 2022-08-15 LAB
ATRIAL RATE - MUSE: 58 BPM
DIASTOLIC BLOOD PRESSURE - MUSE: NORMAL MMHG
INTERPRETATION ECG - MUSE: NORMAL
P AXIS - MUSE: 33 DEGREES
PR INTERVAL - MUSE: 164 MS
QRS DURATION - MUSE: 82 MS
QT - MUSE: 418 MS
QTC - MUSE: 410 MS
R AXIS - MUSE: -7 DEGREES
SYSTOLIC BLOOD PRESSURE - MUSE: NORMAL MMHG
T AXIS - MUSE: 20 DEGREES
VENTRICULAR RATE- MUSE: 58 BPM

## 2022-08-15 PROCEDURE — 99214 OFFICE O/P EST MOD 30 MIN: CPT | Mod: 95 | Performed by: NURSE PRACTITIONER

## 2022-08-15 ASSESSMENT — PATIENT HEALTH QUESTIONNAIRE - PHQ9: SUM OF ALL RESPONSES TO PHQ QUESTIONS 1-9: 10

## 2022-08-15 NOTE — PROGRESS NOTES
Keena is a 42 year old who is being evaluated via a billable video visit.      How would you like to obtain your AVS? MyChart  If the video visit is dropped, the invitation should be resent by: Text to cell phone: [phone]  Will anyone else be joining your video visit? No        Assessment & Plan     Infection due to 2019 novel coronavirus  Tolerating symptoms well.  Treated for mood disorder.  Qualifies for paxlovid.             No follow-ups on file.    YO Rivera Ra, CNP  [facility]    Subjective   Keena is a 42 year old, presenting for the following health issues:  Covid Concern      HPI       COVID-19 Symptom Review  How many days ago did these symptoms start? 8/11/2022    Are any of the following symptoms significant for you?    New or worsening difficulty breathing? No    Worsening cough? Yes, I am coughing up mucus.    Fever or chills? Yes, I felt feverish or had chills.    Headache: YES    Sore throat: YES    Chest pain: No    Diarrhea: YES    Body aches? YES    What treatments has patient tried? Tylenol    Does patient live in a nursing home, group home, or shelter? No  Does patient have a way to get food/medications during quarantined? Yes, I have a friend or family member who can help me.    Tolerating symptoms well.  Staying hydrated.  ED visit yesterday.  Improvement in diarrhea.  Still weak and running fevers.    Reviewed chart for 5 minutes.    Review of Systems   Constitutional, HEENT, cardiovascular, pulmonary, gi and gu systems are negative, except as otherwise noted.      Objective           Vitals:  No vitals were obtained today due to virtual visit.    Physical Exam   GENERAL: Healthy, alert and no distress  EYES: Eyes grossly normal to inspection.  No discharge or erythema, or obvious scleral/conjunctival abnormalities.  RESP: No audible wheeze, cough, or visible cyanosis.  No visible retractions or increased work of breathing.    SKIN: Visible  skin clear. No significant rash, abnormal pigmentation or lesions.  NEURO: Cranial nerves grossly intact.  Mentation and speech appropriate for age.  PSYCH: Mentation appears normal, affect normal/bright, judgement and insight intact, normal speech and appearance well-groomed.                Video-Visit Details    Video Start Time: 10:00 AM    Type of service:  Video Visit    Video End Time:10:05 AM    Originating Location (pt. Location): Home    Distant Location (provider location[facility]     Platform used for Video Visit: Irene    .  ..

## 2022-08-15 NOTE — PATIENT INSTRUCTIONS
Decrease your dose of buspirone by 50% for the next 8 days.    You will take 1/2 tablet (15mg) twice daily.    Your wellbutrin may not work as well over the next 8 days but no change to dosing is needed.

## 2022-09-08 ENCOUNTER — VIRTUAL VISIT (OUTPATIENT)
Dept: FAMILY MEDICINE | Facility: CLINIC | Age: 42
End: 2022-09-08
Payer: COMMERCIAL

## 2022-09-08 DIAGNOSIS — Z12.31 ENCOUNTER FOR SCREENING MAMMOGRAM FOR BREAST CANCER: ICD-10-CM

## 2022-09-08 DIAGNOSIS — R41.840 ATTENTION DEFICIT: Primary | ICD-10-CM

## 2022-09-08 PROCEDURE — 99214 OFFICE O/P EST MOD 30 MIN: CPT | Mod: 95 | Performed by: FAMILY MEDICINE

## 2022-09-08 ASSESSMENT — ANXIETY QUESTIONNAIRES
3. WORRYING TOO MUCH ABOUT DIFFERENT THINGS: MORE THAN HALF THE DAYS
GAD7 TOTAL SCORE: 16
5. BEING SO RESTLESS THAT IT IS HARD TO SIT STILL: NEARLY EVERY DAY
GAD7 TOTAL SCORE: 16
7. FEELING AFRAID AS IF SOMETHING AWFUL MIGHT HAPPEN: SEVERAL DAYS
GAD7 TOTAL SCORE: 16
8. IF YOU CHECKED OFF ANY PROBLEMS, HOW DIFFICULT HAVE THESE MADE IT FOR YOU TO DO YOUR WORK, TAKE CARE OF THINGS AT HOME, OR GET ALONG WITH OTHER PEOPLE?: SOMEWHAT DIFFICULT
1. FEELING NERVOUS, ANXIOUS, OR ON EDGE: MORE THAN HALF THE DAYS
2. NOT BEING ABLE TO STOP OR CONTROL WORRYING: MORE THAN HALF THE DAYS
IF YOU CHECKED OFF ANY PROBLEMS ON THIS QUESTIONNAIRE, HOW DIFFICULT HAVE THESE PROBLEMS MADE IT FOR YOU TO DO YOUR WORK, TAKE CARE OF THINGS AT HOME, OR GET ALONG WITH OTHER PEOPLE: SOMEWHAT DIFFICULT
6. BECOMING EASILY ANNOYED OR IRRITABLE: NEARLY EVERY DAY
4. TROUBLE RELAXING: NEARLY EVERY DAY
7. FEELING AFRAID AS IF SOMETHING AWFUL MIGHT HAPPEN: SEVERAL DAYS

## 2022-09-08 ASSESSMENT — PATIENT HEALTH QUESTIONNAIRE - PHQ9
SUM OF ALL RESPONSES TO PHQ QUESTIONS 1-9: 9
10. IF YOU CHECKED OFF ANY PROBLEMS, HOW DIFFICULT HAVE THESE PROBLEMS MADE IT FOR YOU TO DO YOUR WORK, TAKE CARE OF THINGS AT HOME, OR GET ALONG WITH OTHER PEOPLE: SOMEWHAT DIFFICULT
SUM OF ALL RESPONSES TO PHQ QUESTIONS 1-9: 9

## 2022-09-08 NOTE — PROGRESS NOTES
Keena is a 42 year old who is being evaluated via a billable video visit.      How would you like to obtain your AVS? MyChart  If the video visit is dropped, the invitation should be resent by: Text to cell phone: [phone]  Will anyone else be joining your video visit? No        Assessment & Plan     Attention deficit  We will test the patient for ADHD.  She is seeing psychiatry so she can follow-up with them for medication management if she is diagnosed with ADHD  - Adult Mental Health  Referral; Future    Encounter for screening mammogram for breast cancer    - MA Screen Bilateral w/[facility]; Future      30 minutes spent on the date of the encounter doing chart review, history and exam, documentation and further activities per the note         No follow-ups on file.    Silvana Patton DO  [facility]   Keena is a 42 year old, presenting for the following health issues:  Consult For (Possible ADHD/)      History of Present Illness       Reason for visit:  I wonder if I have ADHD and not depression/anxiety.  Or, ADHD and anxiety.  Symptom onset:  More than a month  Symptoms include:  I got chills reading this: https://www.ncbi.nlm.nih.gov/pmc/articles/KNP5505631/  Symptom intensity:  Severe  Symptom progression:  Staying the same  Had these symptoms before:  Yes  Has tried/received treatment for these symptoms:  No    She eats 4 or more servings of fruits and vegetables daily.She consumes 0 sweetened beverage(s) daily.She exercises with enough effort to increase her heart rate 9 or less minutes per day.  She exercises with enough effort to increase her heart rate 3 or less days per week. She is missing 1 dose(s) of medications per week.  She is not taking prescribed medications regularly due to remembering to take.       Depression and Anxiety Follow-Up    How are you doing with your depression since your last visit? Improved     How are you doing with your  anxiety since your last visit?  Improved     Are you having other symptoms that might be associated with depression or anxiety? No    Have you had a significant life event? No     Do you have any concerns with your use of alcohol or other drugs? No     buspar 15 mg bid stopped wellbutrin     She was seeing psych     Social History     Tobacco Use     Smoking status: Former Smoker     Packs/day: 0.00     Years: 0.00     Pack years: 0.00     Smokeless tobacco: Never Used     Tobacco comment: smoked from ages [age]   Vaping Use     Vaping Use: Never used   Substance Use Topics     Alcohol use: No     Comment: none due to being overeaters annonymous     Drug use: No     PHQ 7/6/2022 8/15/2022 9/8/2022   PHQ-9 Total Score 19 10 9   Q9: Thoughts of better off dead/self-harm past 2 weeks Not at all Not at all Not at all     DAYANARA-7 SCORE 6/10/2022 6/29/2022 9/8/2022   Total Score - - -   Total Score 21 (severe anxiety) 21 (severe anxiety) 16 (severe anxiety)   Total Score 21 21 16     Last PHQ-9 9/8/2022   1.  Little interest or pleasure in doing things 1   2.  Feeling down, depressed, or hopeless 1   3.  Trouble falling or staying asleep, or sleeping too much 1   4.  Feeling tired or having little energy 1   5.  Poor appetite or overeating 1   6.  Feeling bad about yourself 3   7.  Trouble concentrating 1   8.  Moving slowly or restless 0   Q9: Thoughts of better off dead/self-harm past 2 weeks 0   PHQ-9 Total Score 9   Difficulty at work, home, or with people -     DAYANARA-7  9/8/2022   1. Feeling nervous, anxious, or on edge 2   2. Not being able to stop or control worrying 2   3. Worrying too much about different things 2   4. Trouble relaxing 3   5. Being so restless that it is hard to sit still 3   6. Becoming easily annoyed or irritable 3   7. Feeling afraid, as if something awful might happen 1   DAYANARA-7 Total Score 16   If you checked any problems, how difficult have they made it for you to do your work, take care of  things at home, or get along with other people? Somewhat difficult       Suicide Assessment Five-step Evaluation and Treatment (SAFE-T)          Review of Systems   Constitutional, HEENT, cardiovascular, pulmonary, gi and gu systems are negative, except as otherwise noted.      Objective           Vitals:  No vitals were obtained today due to virtual visit.    Physical Exam   GENERAL: Healthy, alert and no distress  EYES: Eyes grossly normal to inspection.  No discharge or erythema, or obvious scleral/conjunctival abnormalities.  RESP: No audible wheeze, cough, or visible cyanosis.  No visible retractions or increased work of breathing.    SKIN: Visible skin clear. No significant rash, abnormal pigmentation or lesions.  NEURO: Cranial nerves grossly intact.  Mentation and speech appropriate for age.  PSYCH: Mentation appears normal, affect normal/bright, judgement and insight intact, normal speech and appearance well-groomed.          Video-Visit Details    Video Start Time: 12:55 PM    Type of service:  Video Visit    Video End Time:1:35 p.m.    Originating Location (pt. Location): Home    Distant Location (provider location):  [facility]     Platform used for Video Visit: Canonical

## 2022-09-15 ENCOUNTER — ANCILLARY PROCEDURE (OUTPATIENT)
Dept: MAMMOGRAPHY | Facility: CLINIC | Age: 42
End: 2022-09-15
Attending: FAMILY MEDICINE
Payer: COMMERCIAL

## 2022-09-15 ENCOUNTER — LAB (OUTPATIENT)
Dept: LAB | Facility: HOSPITAL | Age: 42
End: 2022-09-15
Attending: FAMILY MEDICINE
Payer: COMMERCIAL

## 2022-09-15 DIAGNOSIS — Z12.31 ENCOUNTER FOR SCREENING MAMMOGRAM FOR BREAST CANCER: ICD-10-CM

## 2022-09-15 DIAGNOSIS — E89.0 POSTSURGICAL HYPOTHYROIDISM: Primary | ICD-10-CM

## 2022-09-15 DIAGNOSIS — E05.10 TOXIC UNINODULAR GOITER: ICD-10-CM

## 2022-09-15 LAB — TSH SERPL DL<=0.005 MIU/L-ACNC: 0.02 UIU/ML (ref 0.3–5)

## 2022-09-15 PROCEDURE — 77067 SCR MAMMO BI INCL CAD: CPT

## 2022-09-15 PROCEDURE — 84443 ASSAY THYROID STIM HORMONE: CPT

## 2022-09-15 PROCEDURE — 84439 ASSAY OF FREE THYROXINE: CPT

## 2022-09-15 PROCEDURE — 36415 COLL VENOUS BLD VENIPUNCTURE: CPT

## 2022-09-16 LAB — T4 FREE SERPL-MCNC: 2.05 NG/DL (ref 0.9–1.7)

## 2022-11-14 ENCOUNTER — VIRTUAL VISIT (OUTPATIENT)
Dept: PSYCHOLOGY | Facility: CLINIC | Age: 42
End: 2022-11-14
Attending: FAMILY MEDICINE
Payer: COMMERCIAL

## 2022-11-14 DIAGNOSIS — F41.1 GAD (GENERALIZED ANXIETY DISORDER): Primary | ICD-10-CM

## 2022-11-14 DIAGNOSIS — R41.840 ATTENTION DEFICIT: ICD-10-CM

## 2022-11-14 PROCEDURE — 90791 PSYCH DIAGNOSTIC EVALUATION: CPT | Mod: 95 | Performed by: PSYCHOLOGIST
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
## 2022-11-16 ENCOUNTER — LAB (OUTPATIENT)
Dept: LAB | Facility: HOSPITAL | Age: 42
End: 2022-11-16
Payer: COMMERCIAL

## 2022-11-16 DIAGNOSIS — E89.0 POSTSURGICAL HYPOTHYROIDISM: Primary | ICD-10-CM

## 2022-11-16 DIAGNOSIS — E05.00 BASEDOW'S DISEASE: ICD-10-CM

## 2022-11-16 LAB
T4 FREE SERPL-MCNC: 1.73 NG/DL (ref 0.9–1.7)
TSH SERPL DL<=0.005 MIU/L-ACNC: 0.15 UIU/ML (ref 0.3–4.2)

## 2022-11-16 PROCEDURE — 84439 ASSAY OF FREE THYROXINE: CPT

## 2022-11-16 PROCEDURE — 84443 ASSAY THYROID STIM HORMONE: CPT

## 2022-11-16 PROCEDURE — 36415 COLL VENOUS BLD VENIPUNCTURE: CPT

## 2022-11-17 ENCOUNTER — FCC EXTENDED DOCUMENTATION (OUTPATIENT)
Dept: PSYCHOLOGY | Facility: CLINIC | Age: 42
End: 2022-11-17

## 2022-11-17 NOTE — PROGRESS NOTES
"[facility]     Patient: Keena Tobias  YOB: 1980  MRN: [id number]  Date(s) of assessment: 11/14/22    Information about appointment:  Client attended two  sessions to aid in determining client's mental health diagnosis or diagnoses and treatment recommendations that best address client concerns. Client records includingmedical were reviewed. A diagnostic assessment was conducted at the initial appointment. Client completed several rating scales to assist in assessing attention-related and other mental health symptoms that may be causing impairments in functioning. Rating scales were also completed by a collateral contact.    Assessment tools:      Dominick Adult ADHD Rating Scale-IV: Self and Other Reports (BAARS-IV), Dominick Functional Impairment Scale: Self and Other Reports (BFIS), Dominick Deficits in Executive Functioning Scale: Self and Other Reports (BDEFS), Patient Health Questionnaire-9 (PHQ-9), Generalized Anxiety Disorder-7 (DAYANARA-7) and CNS Neurocognitive Assessment    Assessment Results:    Dominick Adult ADHD Rating Scale-IV: Self and Other Reports (BAARS-IV)  The BAARS-IV assesses for symptoms of ADHD that are experienced in one's daily life. This assessment measure includes self and collateral rating scales designed to provide information regarding current and childhood symptoms of ADHD including inattention, hyperactivity, and impulsivity. Self-report scores are reported as percentiles. Scores at the 76th-83rd percentile are considered marginal, scores at the 84th-92nd percentile are considered borderline, scores at the 93rd-95th percentile are considered mild, scores at the 96th-98th percentile are considered moderate, and those at the 99th percentile are considered severe. Collateral or \"other\" rating scales are reported as number of symptoms observed in comparison to those reported by the client. Norms and percentile scores are not available for collateral " reports.     Current Symptoms Scale--Self Report:   Client completed the self-report inventory of current symptoms. The results indicate that the client's Total ADHD Score was 51 which places her in the 98th percentile for overall ADHD symptoms. In addition, the client endorsed 9/9 (99th percentile) Inattention symptoms, 4/5 (94th percentile) Hyperactivity symptoms, 2/4 (75th percentile) Impulsivity symptoms, and 9/9 (99th percentile) Sluggish Cognitive Tempo symptoms. Client indicated that the reported symptoms have resulted in impaired functioning in home, work and social relationships. Overall, the results suggest the client is expeiencing Moderate ADHD symptoms.     Current Symptoms Scale--Other Report:  Client's spouse completed the collateral report inventory of current symptoms. Based on the collateral contact's observation of symptoms, the client demonstrates 9/9 Inattention symptoms, 4/5 Hyperactivity symptoms, 4/4 Impulsivity symptoms, and 9/9 Sluggish Cognitive Tempo symptoms. The client's Total ADHD Score was 58. The collateral contact indicated the client demonstrates impaired functioning in home, work and social relationships} The collateral- and self-report scores are not significantly different.     Childhood Symptoms Scale--Self-Report:  Client completed the self-report inventory of childhood symptoms. The results indicate that the client's Total ADHD Score was 9 which places her in the 99th percentile for overall ADHD symptoms in childhood. In addition, the client endorsed 9/9 (99th percentile) Inattention symptoms and  9/9 (99th percentile) Hyperactivity-Impulsivity symptoms. Client indicated that the reported symptoms resulted in impaired functioning in school, home and social relationships Overall, the results suggest the client experienced  Severe symptoms of ADHD as a child.     Childhood Symptoms Scale--Other Report:  Not available                          Dominick Functional Impairment Scale:  "Self and Other Reports (BFIS)  The BFIS is used to assess an individuals' psychosocial impairment in major life/daily activities that may be due to a mental health disorder. This assessment measure includes self and collateral rating scales. Self-report scores are reported as percentiles. Scores at the 76th-83rd percentile are considered marginal, scores at the 84th-92nd percentile are considered borderline, scores at the 93rd-95th percentile are considered mild, scores at the 96th-98th percentile are considered moderate, and those at the 99th percentile are considered severe.Collateral or \"other\" rating scales are reported as number of symptoms observed in comparison to those reported by the client. Norms and percentile scores are not available for collateral reports.     Results indicate the client identified impairment (scores at or greater than 93rd percentile) in the following areas: home-family, home-chores, work, social-friends, community activities, marriage/cohabiting/dating, money management, driving, sexual relations, daily responsibilities, self-care routines and health maintenance The client's Mean Impairment Score was 7.5 (99th percentile) indicating the client is reporting Severe impairment in functioning across domains. Client's spouse completed the collateral rating scale, which indicated similar results. The collateral contact's scores were generally lower than the client's report.     Dominick Deficits in Executive Functioning Scale (BDEFS)  The BDEFS is a measure used for evaluating dimensions of adult executive functioning in daily life.This assessment measure includes self and collateral rating scales. Self-report scores are reported as percentiles. Scores at the 76th-83rd percentile are considered marginal, scores at the 84th-92nd percentile are considered borderline, scores at the 93rd-95th percentile are considered mild, scores at the 96th-98th percentile are considered moderate, and those at " "the 99th percentile are considered severe.Collateral or \"other\" rating scales are reported as number of symptoms observed in comparison to those reported by the client. Norms and percentile scores are not available for collateral reports.     Results indicate the client's Total Executive Functioning Score was 285  (99th percentile). The ADHD-Executive Functioning Index score was 30 (97thpercentile). These scores suggest the client has Severe deficits in executive functioning. These deficits are likely to be due to ADHD. Results indicate the client identified significant deficits in the following areas: self-management to time 99th percentile , self-organization/problem-solving 97th percentile,  self-restraint 97th percentile, self-motivation 98thpercentile and self-regulation of emotions 99th percentile. Client's spouse completed the collateral rating scale, which indicated similar results. The collateral contact's scores were generally lower than the client's report.    CNS Vital Signs Neurocognitive Battery  The CNS Vital Signs Neurocognitive Battery is a remotely-administered assessment comprised of seven core subtests to individually measure the patient's verbal memory, visual memory, motor speed, psychomotor speed, reaction time, focus, ability to sustain attention and ability to adapt to changing rules and tasks.      Above average domain scores indicate a standard score (SS) greater than 109 or a Percentile Rank (VA) greater than 74, indicating a high functioning test subject. Average is a SS  or VA 25-74, indicating normal function. Low Average is a SS 80-89 or VA 9-24 indicating a slight deficit or impairment. Below Average is a SS 70-79 or VA 2-8, indicating a moderate level of deficit or impairment. Very Low is a SS less than 70 or a VA less than 2, indicating a deficit and impairment.  Validity Indicator denotes a guideline for representing the possibility of an invalid test or domain score, and " can be influenced by patient understanding, effort, or other conditions.      Neurocognitive Index (NCI): Measures an average score derived from the domain scores or a general assessment of the overall neurocognitive status of the patient. The patient's NCI score is 58, with a percentile of 1, and falls within the very low range.    Composite Memory: Measures how well subject can recognize, remember, and retrieve words and geometric figures, and is comprised of the Visual and Verbal Memory domains. The patient's Composite Memory score is 86, with a percentile of 18, and falls within the low average range.    Verbal Memory: Measures how well subject can recognize, remember, and retrieve words. The patient's Verbal Memory score is 66, with a percentile of 1, and falls within the very low range.    Visual Memory: Measures how well subject can recognize, remember and retrieve geometric figures. The patient's Visual Memory score is 110, with a percentile of 75, and falls within the above average range.    Psychomotor Speed: Measures how well a subject perceives, attends, responds to complex visual-perceptual information and performs simple fine motor coordination, and is comprised of the Motor Speed and Processing Speed indexes. The patient's Psychomotor Speed score is 76, with a percentile of 5, and falls within the low range.    Reaction Time: Measures how quickly the subject can react, in milliseconds, to a simple and increasingly complex direction set. The patient's Reaction Time score is 26, with a percentile of 1, and falls within the very low range.    Complex Attention: Measures the ability to track and respond to a variety of stimuli over lengthy periods of time and/or perform complex mental tasks requiring vigilance quickly and accurately. The patient's Complex Attention score is 48, with a percentile of 1, and falls within the very low range.    Cognitive Flexibility: Measures how well subject is able to adapt  to rapidly changing and increasingly complex set of directions and/or to manipulate the information. The patient's Cognitive Flexibility score is 56, with a percentile of 1, and falls within the very low range.    Processing Speed: Measures how well a subject recognizes and processes information i.e., perceiving, attending/responding to incoming information, motor speed, fine motor coordination, and visual-perceptual ability. The patient's Processing Speed score is 94, with a percentile of 34, and falls within the average range.    Executive Function: Measures how well a subject recognizes rules, categories, and manages or navigates rapid decision making. The patient's Executive Function score is 54, with a percentile of 1, and falls within the very low range.    Simple Attention: Measures the ability to track and respond to a single defined stimulus over lengthy periods of time while performing vigilance and response inhibition quickly and accurately to a simple task. The patient's Simple Attention score is -16, with a percentile of 1, and falls within the very low range.    Motor Speed: Measure: Ability to perform simple movements to produce and satisfy an intention towards a manual action and goal. The patient's Motor Speed score is 74, with a percentile of 4, and falls within the low range.      Generalized Anxiety Disorder Questionnaire (DAYANARA-7)  This questionnaire is designed to assess for anxiety in adults.  Based on the score, she is experiencing severe symptoms of anxiety. Client identified the following symptoms of anxiety: feeling on edge/nervous/anxious, difficulty controlling worry, worrying about many different things, trouble relaxing, being restless, becoming easily annoyed or irritable and feeling something awful might happen    Patient Health Questionnaire- 9 (PHQ-9)   This questionnaire is designed to assess for depression in adults.  Based on the score, she is experiencing moderate symptoms of  depression. Client identified the following symptoms of depression: depressed mood, lack of interest, feeling bad about self, poor concentration and suicidal ideation.         Summary :      Diagnosis:     ADHD, inattentive type  DAYANARA  Depression, moderate recurrent      Recommendations:    Schedule an appointment with your physician to discuss a medication evaluation. and Consider working with an ADHD  or individual therapist to learn skills to  assist with symptom management, as well as ways to improve relationships,  etc that may have been impacted by your symptoms.     SCARLET CRAWFORD PsyD

## 2022-11-18 ASSESSMENT — ANXIETY QUESTIONNAIRES
3. WORRYING TOO MUCH ABOUT DIFFERENT THINGS: NEARLY EVERY DAY
GAD7 TOTAL SCORE: 21
IF YOU CHECKED OFF ANY PROBLEMS ON THIS QUESTIONNAIRE, HOW DIFFICULT HAVE THESE PROBLEMS MADE IT FOR YOU TO DO YOUR WORK, TAKE CARE OF THINGS AT HOME, OR GET ALONG WITH OTHER PEOPLE: EXTREMELY DIFFICULT
4. TROUBLE RELAXING: NEARLY EVERY DAY
7. FEELING AFRAID AS IF SOMETHING AWFUL MIGHT HAPPEN: NEARLY EVERY DAY
2. NOT BEING ABLE TO STOP OR CONTROL WORRYING: NEARLY EVERY DAY
GAD7 TOTAL SCORE: 21
6. BECOMING EASILY ANNOYED OR IRRITABLE: NEARLY EVERY DAY
8. IF YOU CHECKED OFF ANY PROBLEMS, HOW DIFFICULT HAVE THESE MADE IT FOR YOU TO DO YOUR WORK, TAKE CARE OF THINGS AT HOME, OR GET ALONG WITH OTHER PEOPLE?: EXTREMELY DIFFICULT
GAD7 TOTAL SCORE: 21
1. FEELING NERVOUS, ANXIOUS, OR ON EDGE: NEARLY EVERY DAY
5. BEING SO RESTLESS THAT IT IS HARD TO SIT STILL: NEARLY EVERY DAY
7. FEELING AFRAID AS IF SOMETHING AWFUL MIGHT HAPPEN: NEARLY EVERY DAY

## 2022-11-21 ENCOUNTER — VIRTUAL VISIT (OUTPATIENT)
Dept: PSYCHOLOGY | Facility: CLINIC | Age: 42
End: 2022-11-21
Payer: COMMERCIAL

## 2022-11-21 DIAGNOSIS — F90.0 ADHD (ATTENTION DEFICIT HYPERACTIVITY DISORDER), INATTENTIVE TYPE: Primary | ICD-10-CM

## 2022-11-21 DIAGNOSIS — F41.1 GAD (GENERALIZED ANXIETY DISORDER): ICD-10-CM

## 2022-11-21 DIAGNOSIS — F33.1 MODERATE RECURRENT MAJOR DEPRESSION (H): ICD-10-CM

## 2022-11-21 PROCEDURE — 96130 PSYCL TST EVAL PHYS/QHP 1ST: CPT | Mod: 95 | Performed by: PSYCHOLOGIST

## 2022-11-21 PROCEDURE — 96131 PSYCL TST EVAL PHYS/QHP EA: CPT | Mod: 95 | Performed by: PSYCHOLOGIST

## 2022-11-21 NOTE — PROGRESS NOTES
[facility]     Patient: Keena Tobias  YOB: 1980  MRN: [id number]  Date(s) of assessment: 11/14/22    START TIME: 8:00  END TIME: 8:10  PREFERRED PHONE: [phone]  May we leave a program related message: Yes  SERVICE MODALITY:  Video Visit:      Provider verified identity through the following two step process.  Patient provided:  Patient is known previously to provider    Telemedicine Visit: The patient's condition can be safely assessed and treated via synchronous audio and visual telemedicine encounter.      Reason for Telemedicine Visit: Patient has requested telehealth visit    Originating Site (Patient Location): Patient's home    Distant Site (Provider Location): Provider Remote Setting- Home Office/ Off-site    Consent:  The patient/guardian has verbally consented to: the potential risks and benefits of telemedicine (video visit) versus in person care; bill my insurance or make self-payment for services provided; and responsibility for payment of non-covered services.     Patient would like the video invitation sent by:  My Chart    Mode of Communication:  Video Conference via [employer]    As the provider I attest to compliance with applicable laws and regulations related to telemedicine.       Information about appointment:  Client attended two  sessions to aid in determining client's mental health diagnosis or diagnoses and treatment recommendations that best address client concerns. Client records includingmedical were reviewed. A diagnostic assessment was conducted at the initial appointment. Client completed several rating scales to assist in assessing attention-related and other mental health symptoms that may be causing impairments in functioning. Rating scales were also completed by a collateral contact.     Assessment tools:       Dominick Adult ADHD Rating Scale-IV: Self and Other Reports (BAARS-IV), Dominick Functional Impairment Scale: Self and Other Reports  "(BFIS), Dominick Deficits in Executive Functioning Scale: Self and Other Reports (BDEFS), Patient Health Questionnaire-9 (PHQ-9), Generalized Anxiety Disorder-7 (DAYANARA-7) and CNS Neurocognitive Assessment     Assessment Results:     Dominick Adult ADHD Rating Scale-IV: Self and Other Reports (BAARS-IV)  The BAARS-IV assesses for symptoms of ADHD that are experienced in one's daily life. This assessment measure includes self and collateral rating scales designed to provide information regarding current and childhood symptoms of ADHD including inattention, hyperactivity, and impulsivity. Self-report scores are reported as percentiles. Scores at the 76th-83rd percentile are considered marginal, scores at the 84th-92nd percentile are considered borderline, scores at the 93rd-95th percentile are considered mild, scores at the 96th-98th percentile are considered moderate, and those at the 99th percentile are considered severe. Collateral or \"other\" rating scales are reported as number of symptoms observed in comparison to those reported by the client. Norms and percentile scores are not available for collateral reports.      Current Symptoms Scale--Self Report:   Client completed the self-report inventory of current symptoms. The results indicate that the client's Total ADHD Score was 51 which places her in the 98th percentile for overall ADHD symptoms. In addition, the client endorsed 9/9 (99th percentile) Inattention symptoms, 4/5 (94th percentile) Hyperactivity symptoms, 2/4 (75th percentile) Impulsivity symptoms, and 9/9 (99th percentile) Sluggish Cognitive Tempo symptoms. Client indicated that the reported symptoms have resulted in impaired functioning in home, work and social relationships. Overall, the results suggest the client is experiencing Moderate ADHD symptoms.      Current Symptoms Scale--Other Report:  Client's spouse completed the collateral report inventory of current symptoms. Based on the collateral " "contact's observation of symptoms, the client demonstrates 9/9 Inattention symptoms, 4/5 Hyperactivity symptoms, 4/4 Impulsivity symptoms, and 9/9 Sluggish Cognitive Tempo symptoms. The client's Total ADHD Score was 58. The collateral contact indicated the client demonstrates impaired functioning in home, work and social relationships} The collateral- and self-report scores are not significantly different.      Childhood Symptoms Scale--Self-Report:  Client completed the self-report inventory of childhood symptoms. The results indicate that the client's Total ADHD Score was 9 which places her in the 99th percentile for overall ADHD symptoms in childhood. In addition, the client endorsed 9/9 (99th percentile) Inattention symptoms and  9/9 (99th percentile) Hyperactivity-Impulsivity symptoms. Client indicated that the reported symptoms resulted in impaired functioning in school, home and social relationships Overall, the results suggest the client experienced  Severe symptoms of ADHD as a child.      Childhood Symptoms Scale--Other Report:  Not available                          Dominick Functional Impairment Scale: Self and Other Reports (BFIS)  The BFIS is used to assess an individuals' psychosocial impairment in major life/daily activities that may be due to a mental health disorder. This assessment measure includes self and collateral rating scales. Self-report scores are reported as percentiles. Scores at the 76th-83rd percentile are considered marginal, scores at the 84th-92nd percentile are considered borderline, scores at the 93rd-95th percentile are considered mild, scores at the 96th-98th percentile are considered moderate, and those at the 99th percentile are considered severe.Collateral or \"other\" rating scales are reported as number of symptoms observed in comparison to those reported by the client. Norms and percentile scores are not available for collateral reports.      Results indicate the client " "identified impairment (scores at or greater than 93rd percentile) in the following areas: home-family, home-chores, work, social-friends, community activities, marriage/cohabiting/dating, money management, driving, sexual relations, daily responsibilities, self-care routines and health maintenance The client's Mean Impairment Score was 7.5 (99th percentile) indicating the client is reporting Severe impairment in functioning across domains. Client's spouse completed the collateral rating scale, which indicated similar results. The collateral contact's scores were generally lower than the client's report.     Dmoinick Deficits in Executive Functioning Scale (BDEFS)  The BDEFS is a measure used for evaluating dimensions of adult executive functioning in daily life.This assessment measure includes self and collateral rating scales. Self-report scores are reported as percentiles. Scores at the 76th-83rd percentile are considered marginal, scores at the 84th-92nd percentile are considered borderline, scores at the 93rd-95th percentile are considered mild, scores at the 96th-98th percentile are considered moderate, and those at the 99th percentile are considered severe.Collateral or \"other\" rating scales are reported as number of symptoms observed in comparison to those reported by the client. Norms and percentile scores are not available for collateral reports.      Results indicate the client's Total Executive Functioning Score was 285  (99th percentile). The ADHD-Executive Functioning Index score was 30 (97thpercentile). These scores suggest the client has Severe deficits in executive functioning. These deficits are likely to be due to ADHD. Results indicate the client identified significant deficits in the following areas: self-management to time 99th percentile , self-organization/problem-solving 97th percentile,  self-restraint 97th percentile, self-motivation 98thpercentile and self-regulation of emotions 99th " percentile. Client's spouse completed the collateral rating scale, which indicated similar results. The collateral contact's scores were generally lower than the client's report.     CNS Vital Signs Neurocognitive Battery  The CNS Vital Signs Neurocognitive Battery is a remotely-administered assessment comprised of seven core subtests to individually measure the patient's verbal memory, visual memory, motor speed, psychomotor speed, reaction time, focus, ability to sustain attention and ability to adapt to changing rules and tasks.       Above average domain scores indicate a standard score (SS) greater than 109 or a Percentile Rank (NV) greater than 74, indicating a high functioning test subject. Average is a SS  or NV 25-74, indicating normal function. Low Average is a SS 80-89 or NV 9-24 indicating a slight deficit or impairment. Below Average is a SS 70-79 or NV 2-8, indicating a moderate level of deficit or impairment. Very Low is a SS less than 70 or a NV less than 2, indicating a deficit and impairment.  Validity Indicator denotes a guideline for representing the possibility of an invalid test or domain score, and can be influenced by patient understanding, effort, or other conditions.       Neurocognitive Index (NCI): Measures an average score derived from the domain scores or a general assessment of the overall neurocognitive status of the patient. The patient's NCI score is 58, with a percentile of 1, and falls within the very low range.     Composite Memory: Measures how well subject can recognize, remember, and retrieve words and geometric figures, and is comprised of the Visual and Verbal Memory domains. The patient's Composite Memory score is 86, with a percentile of 18, and falls within the low average range.     Verbal Memory: Measures how well subject can recognize, remember, and retrieve words. The patient's Verbal Memory score is 66, with a percentile of 1, and falls within the very low  range.     Visual Memory: Measures how well subject can recognize, remember and retrieve geometric figures. The patient's Visual Memory score is 110, with a percentile of 75, and falls within the above average range.     Psychomotor Speed: Measures how well a subject perceives, attends, responds to complex visual-perceptual information and performs simple fine motor coordination, and is comprised of the Motor Speed and Processing Speed indexes. The patient's Psychomotor Speed score is 76, with a percentile of 5, and falls within the low range.     Reaction Time: Measures how quickly the subject can react, in milliseconds, to a simple and increasingly complex direction set. The patient's Reaction Time score is 26, with a percentile of 1, and falls within the very low range.     Complex Attention: Measures the ability to track and respond to a variety of stimuli over lengthy periods of time and/or perform complex mental tasks requiring vigilance quickly and accurately. The patient's Complex Attention score is 48, with a percentile of 1, and falls within the very low range.     Cognitive Flexibility: Measures how well subject is able to adapt to rapidly changing and increasingly complex set of directions and/or to manipulate the information. The patient's Cognitive Flexibility score is 56, with a percentile of 1, and falls within the very low range.     Processing Speed: Measures how well a subject recognizes and processes information i.e., perceiving, attending/responding to incoming information, motor speed, fine motor coordination, and visual-perceptual ability. The patient's Processing Speed score is 94, with a percentile of 34, and falls within the average range.     Executive Function: Measures how well a subject recognizes rules, categories, and manages or navigates rapid decision making. The patient's Executive Function score is 54, with a percentile of 1, and falls within the very low  range.     Simple Attention: Measures the ability to track and respond to a single defined stimulus over lengthy periods of time while performing vigilance and response inhibition quickly and accurately to a simple task. The patient's Simple Attention score is -16, with a percentile of 1, and falls within the very low range.     Motor Speed: Measure: Ability to perform simple movements to produce and satisfy an intention towards a manual action and goal. The patient's Motor Speed score is 74, with a percentile of 4, and falls within the low range.        Generalized Anxiety Disorder Questionnaire (DAYANARA-7)  This questionnaire is designed to assess for anxiety in adults.  Based on the score, she is experiencing severe symptoms of anxiety. Client identified the following symptoms of anxiety: feeling on edge/nervous/anxious, difficulty controlling worry, worrying about many different things, trouble relaxing, being restless, becoming easily annoyed or irritable and feeling something awful might happen     Patient Health Questionnaire- 9 (PHQ-9)   This questionnaire is designed to assess for depression in adults.  Based on the score, she is experiencing moderate symptoms of depression. Client identified the following symptoms of depression: depressed mood, lack of interest, feeling bad about self, poor concentration and suicidal ideation.           Summary : Overall, the results suggest the client is experiencing Moderate ADHD symptoms. She endorsed   Severe symptoms of ADHD in childhood. The client identified impairment (scores at or greater than 93rd percentile) in the following areas: home-family, home-chores, work, social-friends, community activities, marriage/cohabiting/dating, money management, driving, sexual relations, daily responsibilities, self-care routines and health maintenance. The client's Mean Impairment Score was in the Severe range across impairments.  The client's  ADHD-Executive Functioning Index  scores suggest the client has Severe deficits in executive functioning. These deficits are likely to be due to ADHD. The client identified significant deficits in the following areas: self-management to time , self-organization/problem-solving ,  self-restraint , self-motivation  and self-regulation of emotions.  On measures of neurocognitive function indicative of ADHD her performance was in the low average to very low range.     Diagnosis:   ADHD, inattentive type  DAYANARA  Depression, moderate recurrent      Recommendations:  Schedule an appointment with your physician to discuss a medication evaluation. Consider working with an ADHD  or individual therapist to learn skills to  assist with symptom management, as well as ways to improve relationships,  etc that may have been impacted by your symptoms.      SCARLET CRAWFORD PsyD    Psychological Testing   Billing/Services Summary       Testing Evaluation Services Base: 58251  (1st 60 mins) Add-on: 81664  (each addtl 60 mins)   Record Review and Clarify Referral Question   11/14/22; 7:40-7:50 10 minutes               Integration/Report Generation   11/17/22; 1:00-2:30 90 minutes   Interactive Feedback Session  11/21/22; 8:00-8:10 10 minutes   Post-Service Work   11/21/22; 8:10-8:20 10 minutes   Total Time: 120 minutes (2 hours, 0 minutes)   Total Units: 1 1                                 Diagnosis(es):   ADHD, inattentive type  DAYANARA  Depression, moderate recurrent

## 2022-11-29 ENCOUNTER — TELEPHONE (OUTPATIENT)
Dept: PSYCHIATRY | Facility: CLINIC | Age: 42
End: 2022-11-29

## 2022-11-29 RX ORDER — BUSPIRONE HYDROCHLORIDE 30 MG/1
30 TABLET ORAL 2 TIMES DAILY
Qty: 60 TABLET | Status: CANCELLED | OUTPATIENT
Start: 2022-11-29

## 2022-11-29 ASSESSMENT — PATIENT HEALTH QUESTIONNAIRE - PHQ9
SUM OF ALL RESPONSES TO PHQ QUESTIONS 1-9: 26
10. IF YOU CHECKED OFF ANY PROBLEMS, HOW DIFFICULT HAVE THESE PROBLEMS MADE IT FOR YOU TO DO YOUR WORK, TAKE CARE OF THINGS AT HOME, OR GET ALONG WITH OTHER PEOPLE: EXTREMELY DIFFICULT
10. IF YOU CHECKED OFF ANY PROBLEMS, HOW DIFFICULT HAVE THESE PROBLEMS MADE IT FOR YOU TO DO YOUR WORK, TAKE CARE OF THINGS AT HOME, OR GET ALONG WITH OTHER PEOPLE: EXTREMELY DIFFICULT
SUM OF ALL RESPONSES TO PHQ QUESTIONS 1-9: 26

## 2022-11-30 ENCOUNTER — VIRTUAL VISIT (OUTPATIENT)
Dept: PSYCHIATRY | Facility: CLINIC | Age: 42
End: 2022-11-30
Payer: COMMERCIAL

## 2022-11-30 ENCOUNTER — VIRTUAL VISIT (OUTPATIENT)
Dept: BEHAVIORAL HEALTH | Facility: CLINIC | Age: 42
End: 2022-11-30
Payer: COMMERCIAL

## 2022-11-30 DIAGNOSIS — F33.1 MODERATE EPISODE OF RECURRENT MAJOR DEPRESSIVE DISORDER (H): Primary | ICD-10-CM

## 2022-11-30 DIAGNOSIS — F41.9 ANXIETY DISORDER, UNSPECIFIED TYPE: ICD-10-CM

## 2022-11-30 DIAGNOSIS — F90.0 ADHD (ATTENTION DEFICIT HYPERACTIVITY DISORDER), INATTENTIVE TYPE: Primary | ICD-10-CM

## 2022-11-30 DIAGNOSIS — F41.9 ANXIETY: ICD-10-CM

## 2022-11-30 DIAGNOSIS — F90.0 ADHD (ATTENTION DEFICIT HYPERACTIVITY DISORDER), INATTENTIVE TYPE: ICD-10-CM

## 2022-11-30 DIAGNOSIS — F33.1 MODERATE EPISODE OF RECURRENT MAJOR DEPRESSIVE DISORDER (H): ICD-10-CM

## 2022-11-30 PROCEDURE — 99215 OFFICE O/P EST HI 40 MIN: CPT | Mod: 95 | Performed by: PSYCHIATRY & NEUROLOGY

## 2022-11-30 PROCEDURE — 90832 PSYTX W PT 30 MINUTES: CPT | Mod: 95 | Performed by: SOCIAL WORKER

## 2022-11-30 RX ORDER — METHYLPHENIDATE HYDROCHLORIDE 5 MG/1
5 TABLET ORAL 2 TIMES DAILY
Qty: 60 TABLET | Refills: 0 | Status: SHIPPED | OUTPATIENT
Start: 2022-11-30 | End: 2022-12-08 | Stop reason: DRUGHIGH

## 2022-11-30 NOTE — PROGRESS NOTES
[facility]  2022      Behavioral Health Clinician Progress Note    Patient Name: Keena Tobias           Service Type:  Individual      Service Location[facility]   [facility] / Email (patient reached)     Session Start Time: 9:33a  Session End Time: 9:50a      Session Length: 16 - 37      Attendees: Client     Service Modality:  Video Visit:      Provider verified identity through the following two step process.  Patient provided:  Patient  and Patient was verified at admission/transfer    Telemedicine Visit: The patient's condition can be safely assessed and treated via synchronous audio and visual telemedicine encounter.      Reason for Telemedicine Visit: Services only offered telehealth    Originating Site (Patient Location): Patient's home    Distant Site (Provider Location): Provider Remote Setting- Home Office    Consent:  The patient/guardian has verbally consented to: the potential risks and benefits of telemedicine (video visit) versus in person care; bill my insurance or make self-payment for services provided; and responsibility for payment of non-covered services.     Patient would like the video invitation sent by:  My Chart    Mode of Communication:  Video Conference via Amwell    Distant Location (Provider):  Off-site    As the provider I attest to compliance with applicable laws and regulations related to telemedicine.    Visit Activities (Refresh list every visit): [facility] Only    Diagnostic Assessment Date: 22  Treatment Plan Review Date: 3/1/23  See Flowsheets for today's PHQ-9 and DAYANARA-7 results  Previous PHQ-9:   PHQ-9 SCORE 2022   PHQ-9 Total Score - - -   PHQ-9 Total Score [facility] 9 (Mild depression) - 26 (Severe depression)   PHQ-9 Total Score 9 26 26     Previous DAYANARA-7:   DAYANARA-7 SCORE 2022   Total Score - - -   Total Score 21 (severe anxiety) 16 (severe anxiety) 21  (severe anxiety)   Total Score 21 16 21       SHIRA LEVEL:  SHIRA Score (Last Two) 4/21/2011   SHIRA Raw Score 50   Activation Score 86.3   SHIRA Level 4       DATA  Extended Session (60+ minutes): No  Interactive Complexity: No  Crisis: No  [facility] Patient: No    Treatment Objective(s) Addressed in This Session:  Target Behavior(s): disease management/lifestyle changes ADHD, depression    Depressed Mood: Increase interest, engagement, and pleasure in doing things  Feel less tired and more energy during the day   Anxiety: will experience a reduction in anxiety  Attention Problems: will develop coping skills to effectively manage attention issues    Current Stressors / Issues:  Pt shares that she had ADHD testing completed within the past month.  Completed testing a with [facility] provider.  Shares that the results indicated she is in the moderate range for inattentive ADHD.  She says that she has been reading a lot of research articles and feels that they describe her very accurately.  Did end up weaning off the wellbutrin and buspar since her last visit.  She shares that she had been on the wellbutrin for a long time but had been experiencing light-headedness daily.  After going off the wellbutrin was just on the buspar but after giving it some time felt no benefit so did end up weaning off that as well.    Struggling with a lot of symptoms and finds everything difficult to do.  Shares that she showered yesterday which felt like a very big task.  She shares that her  helps so much and she isn't sure how she would function without him.  Struggling at work and is very behind.  Gets to work late everyday and at times wonders how she still has a job.     Denies any SI but does at times think it would be easier to not exist due to feeling that every day is such a struggle.        Progress on Treatment Objective(s) / Homework:  Minimal progress - ACTION (Actively working towards change); Intervened by reinforcing  change plan / affirming steps taken    Motivational Interviewing    MI Intervention: Supported Autonomy, Collaboration, Evocation, Permission to raise concern or advise, Open-ended questions and Reflections: simple and complex     Change Talk Expressed by the Patient: Activation    Provider Response to Change Talk: E - Evoked more info from patient about behavior change, A - Affirmed patient's thoughts, decisions, or attempts at behavior change, R - Reflected patient's change talk and S - Summarized patient's change talk statements      Care Plan review completed: Yes    Medication Review:  Changes to psychiatric medications, see updated Medication List in EPIC.     Medication Compliance:  No has weaned self off of medications    Changes in Health Issues:   None reported    Chemical Use Review:   Substance Use: Chemical use reviewed, no active concerns identified      Tobacco Use: No current tobacco use.      Assessment: Current Emotional / Mental Status (status of significant symptoms):  Risk status (Self / Other harm or suicidal ideation)  [place] Suicide Severity Rating Scale (Lifetime/Recent)  [place] Suicide Severity Rating (Lifetime/Recent) 4/16/2020 4/16/2020   Wish to be Dead (Lifetime) Yes Yes   Comments What if I crashed in to the divider in the highway, car accident at 70 mph. Wouldn't it be nice not to be here.   Non-Specific Active Suicidal Thoughts (Lifetime) Yes Yes   Non-Specific Active Suicidal Thought Description (Lifetime) Driving in to the highway median. Wouldn't it be nice not to be here.   Most Severe Ideation Rating (Lifetime) - 2   Most Severe Ideation Description (Lifetime) - Driving in to the highway median.   Frequency (Lifetime) - NA   Duration (Lifetime) - NA   Controllability (Lifetime) - NA   Protective Factors  (Lifetime) - 2   Reasons for Ideation (Lifetime) - 4   RETIRED: 1. Wish to be Dead (Recent) Yes Yes   RETIRED: Wish to be Dead Description (Recent) Wouldn't it be nice not  to wake up or be here. Wouldn't it be nice not to be here.   RETIRED: 2. Non-Specific Active Suicidal Thoughts (Recent) No Yes   Non-Specific Active Suicidal Thought Description (Recent) - Wouldn't it be nice not to be here.   3. Active Suicidal Ideation with any Methods (Not Plan) Without Intent to Act (Lifetime) - Yes   RETIRE: Active Suicidal Ideation with any Methods (Not Plan) Description (Lifetime) - Driving in to the highway median.   RETIRED: 3. Active Suicidal Ideation with any Methods (Not Plan) Without Intent to Act (Recent) - Yes   RETIRED: Active Suicidal Ideation with any Methods (Not Plan) Description (Recent) - No specific ideas or plan   RETIRE: 4. Active Suicidal Ideation with Some Intent to Act, Without Specific Plan (Lifetime) - No   4. Active Suicidal Ideation with Some Intent to Act, Without Specific Plan (Recent) - No   RETIRE: 5. Active Suicidal Ideation with Specific Plan and Intent (Lifetime) - No   RETIRED: 5. Active Suicidal Ideation with Specific Plan and Intent (Recent) - No   Most Severe Ideation Rating (Past Month) - 2   Most Severe Ideation Description (Past Month) - Wouldn't it be nice not to be here.   Frequency (Past Month) - 1   Duration (Past Month) - 1   Controllability (Past Month) - 2   Protective Factors (Past Month) - 1   Reasons for Ideation (Past Month) - 0   Actual Attempt (Lifetime) - No   Actual Attempt (Past 3 Months) - No   Has subject engaged in non-suicidal self-injurious behavior? (Lifetime) - No   Has subject engaged in non-suicidal self-injurious behavior? (Past 3 Months) - No   Interrupted Attempts (Lifetime) - No   Interrupted Attempts (Past 3 Months) - No   Aborted or Self-Interrupted Attempt (Lifetime) - No   Aborted or Self-Interrupted Attempt (Past 3 Months) - No   Preparatory Acts or Behavior (Lifetime) - No   Preparatory Acts or Behavior (Past 3 Months) - No   Most Recent Attempt Actual Lethality Code - NA   Most Lethal Attempt Actual Lethality Code - NA    Initial/First Attempt Actual Lethality Code - NA     Patient denies a history of suicidal ideation, suicide attempts, self-injurious behavior, homicidal ideation, homicidal behavior and and other safety concerns  Patient denies current fears or concerns for personal safety.  Patient denies current or recent suicidal ideation or behaviors.  Patient denies current or recent homicidal ideation or behaviors.  Patient denies current or recent self injurious behavior or ideation.  Patient denies other safety concerns.  A safety and risk management plan has not been developed at this time, however patient was encouraged to call [place] [phone] should there be a change in any of these risk factors.    Appearance:   Appropriate   Eye Contact:   Good   Psychomotor Behavior: Normal   Attitude:   Cooperative   Orientation:   All  Speech   Rate / Production: Normal/ Responsive Normal    Volume:  Normal   Mood:    Normal  Affect:    Appropriate   Thought Content:  Clear   Thought Form:  Coherent  Logical   Insight:    Good     Diagnoses:  1. Moderate episode of recurrent major depressive disorder (H)    2. Anxiety disorder, unspecified type    3. ADHD (attention deficit hyperactivity disorder), inattentive type        Collateral Reports Completed:  Communicated with: [facility] Psychiatrist    Plan: (Homework, other):  Patient was given information about behavioral services and encouraged to schedule a follow up appointment with the clinic [facility] as needed.  She was also given information about mental health symptoms and treatment options .  CD Recommendations: No indications of CD issues.  Ibis Sierra, BEVERLEY, [facility]      ______________________________________________________________________    Integrated Primary Care Behavioral Health Treatment Plan    Patient's Name: Keena Tobias  YOB: 1980    Date of Creation: 11/30/22  Date Treatment Plan Last Reviewed/Revised: 11/30/22    DSM5 Diagnoses:  Attention-Deficit/Hyperactivity Disorder  314.00 (F90.0) Predominantly inattentive presentation, 296.32 (F33.1) Major Depressive Disorder, Recurrent Episode, Moderate _ and With mixed features or 300.00 (F41.9) Unspecified Anxiety Disorder  Psychosocial / Contextual Factors: work stress,   PROMIS (reviewed every 90 days): 24    Referral / Collaboration:  Referral to another professional/service is not indicated at this time.    Anticipated number of session for this episode of care: 3-5  Anticipation frequency of session: Every 1-2 months  Anticipated Duration of each session: 16-37 minutes  Treatment plan will be reviewed in 90 days or when goals have been changed.       MeasurableTreatment Goal(s) related to diagnosis / functional impairment(s)  Goal 1: Patient will experience a reduction in depressive symptoms along with a corresponding increase in positive emotion and life satisfaction.    I will know I've met my goal when everything doesn't feel so difficult.      Objective #A (Patient Action)    Patient will Increase interest, engagement, and pleasure in doing things  Feel less tired and more energy during the day .  Status: New - Date: 11/30/22     Intervention(s)  Therapist will utilize CBT and ACT aimed to help reduce depression and anxiety.        Patient has reviewed and agreed to the above plan.      Ibis Sierra, MSRODRÍGUEZ, LICSW, [facility] [place]  November 30, 2022

## 2022-11-30 NOTE — PROGRESS NOTES
MH&A Post-Appointment Chart -check      Medications ordered this visit were e-scribed.  Verified by order class [x] yes  [] no    List Medications:    methylphenidate (RITALIN) 5 MG tablet  Class: E-Prescribe    Medication changes or discontinuations were communicated to patient's pharmacy: [] yes  [x] no    UA collected [] yes  [x] no  [] n/a-virtual     Outside referrals / labs, etc support staff to follow up: [] yes  [x] no    Future appointment was made: [x] yes  [] no  [] n/a      Dictation completed at time of chart check: [x] yes  [] no    I have checked the documentation for today s encounters and the above information has been reviewed and completed.      Guerline Holbrook on November 30, 2022 at 3:09 PM

## 2022-11-30 NOTE — PROGRESS NOTES
Telemedicine Visit: The patient's condition can be safely assessed and treated via synchronous audio and visual telemedicine encounter.      Reason for Telemedicine Visit: Services only offered telehealth    Originating Site (Patient Location): Patient's home    Distant Site (Provider Location): Provider Remote Setting- Home Office    Consent:  The patient/guardian has verbally consented to: the potential risks and benefits of telemedicine (video visit) versus in person care; bill my insurance or make self-payment for services provided; and responsibility for payment of non-covered services.     Mode of Communication:  Video Conference via Yipit    As the provider I attest to compliance with applicable laws and regulations related to telemedicine.      How would you like to obtain your AVS? MyChart  If the video visit is dropped, the invitation should be resent by: Text to cell phone: [phone]  Will anyone else be joining your video visit? No          Outpatient Psychiatric Progress Note    Name: Keena Tobias   : 1980                    Primary Care Provider: Silvana Patton DO   Therapist: None     PHQ-9 scores: 26.   PHQ-9 SCORE 2022   PHQ-9 Total Score - - -   PHQ-9 Total Score MyChart 9 (Mild depression) - 26 (Severe depression)   PHQ-9 Total Score 9 26 26       DAYANARA-7 scores: 21  DAYANARA-7 SCORE 2022   Total Score - - -   Total Score 21 (severe anxiety) 16 (severe anxiety) 21 (severe anxiety)   Total Score 21 16 21       Patient Identification:  Keena is a 42 year old year old female  who presents for return visit with me.  Patient attended the session alone.     Interim History:  Per Ibis Sierra, LICSW:  Pt shares that she had ADHD testing completed within the past month.  Completed testing a with [facility] provider.  Shares that the results indicated she is in the moderate range for inattentive ADHD.  She says that  "she has been reading a lot of research articles and feels that they describe her very accurately.  Did end up weaning off the wellbutrin and buspar since her last visit.  She shares that she had been on the wellbutrin for a long time but had been experiencing light-headedness daily.  After going off the wellbutrin was just on the buspar but after giving it some time felt no benefit so did end up weaning off that as well.    Struggling with a lot of symptoms and finds everything difficult to do.  Shares that she showered yesterday which felt like a very big task.  She shares that her  helps so much and she isn't sure how she would function without him.  Struggling at work and is very behind.  Gets to work late everyday and at times wonders how she still has a job.     Denies any SI but does at times think it would be easier to not exist due to feeling that every day is such a struggle.     Keena reports that she had testing for attention deficit disorder last month.  She was aware that the test was \"easy,\" but also found it to be very difficult, because she could not use her typical coping skills that she developed over the years.  She was diagnosed with attention deficit disorder by the psychologist.  We did discuss the difficulty of diagnosing attention deficit disorder in the context of untreated or undertreated depression and anxiety.    She has not felt the bupropion and buspirone were particularly beneficial, and discontinued both of them on her own.      As we discussed her history, she reports that she has struggled for a long time with attention and concentration.  Her father probably had ADHD.  He had MS, and was treated with Ritalin for fatigue, but functioned much better than he previously had with Ritalin.  She has a brother with attention deficit disorder, and her twin children have both been diagnosed and treated with attention deficit disorder.  She has never achieved full remission of " either anxiety or depression with treatment to date.  After some discussion, we agreed to try treating attention deficit disorder now, and will address any residual problems with anxiety and depression after a trial of stimulants.    Risks and benefits of treatment for attention deficit disorder were reviewed.  We discussed the possibility of a trial of a tomoxetine, but she was hesitant to do so because she has a very sensitive stomach.  Both of her children are on methylphenidate, so we agreed to a trial of methylphenidate.  She will start with 5 mg tablets and can titrate her dose up to a total of 30 mg twice a day on her own.  She will contact me in about a week and we will discuss dosing adjustments at that time.    In addition, we will follow-up in 6 to 8 weeks to reassess anxiety and depression.    Vital Signs:   There were no vitals taken for this visit.    Labs:  TSH   Date Value Ref Range Status   11/16/2022 0.15 (L) 0.30 - 4.20 uIU/mL Final   09/15/2022 0.02 (L) 0.30 - 5.00 uIU/mL Final   04/26/2021 <0.01 (L) 0.40 - 4.00 mU/L Final       Current Medications:  Current Outpatient Medications   Medication     levothyroxine (SYNTHROID/LEVOTHROID) 175 MCG tablet     methylphenidate (RITALIN) 5 MG tablet     vitamin D3 (CHOLECALCIFEROL) 50 mcg (2000 units) tablet     adapalene (DIFFERIN) 0.3 % external gel     clindamycin (CLEOCIN T) 1 % external lotion     metroNIDAZOLE (METROCREAM) 0.75 % external cream     No current facility-administered medications for this visit.        The [place] Prescription Monitoring Program has been reviewed and there are no concerns about diversionary activity for controlled substances at this time.      Mental Status Examination:  Keena is a 42-year-old woman in no acute distress.  She is neatly groomed in professional clothing.  Speech is clear and normal in rate and tone.  Eye contact is good over the video connection.  Affect is full.  Mood is dysphoric and anxious by her  report.  Thoughts are logical and continuous with no loose associations or flight of ideas.  Thought content shows no psychosis.  Although she indicated thoughts of death on her PHQ 9, she denied any suicidal ideation.  She is alert and oriented x3.    Assessment and Plan:    ICD-10-CM    1. ADHD (attention deficit hyperactivity disorder), inattentive type  F90.0 methylphenidate (RITALIN) 5 MG tablet      2. Moderate episode of recurrent major depressive disorder (H)  F33.1       3. Anxiety  F41.9           Medical comorbidities include:   Patient Active Problem List   Diagnosis     Varicose veins of legs     CARDIOVASCULAR SCREENING; LDL GOAL LESS THAN 160     Moderate major depression (H)     Graves disease     Compulsive overeating     Palpitations     Acne     Postoperative hypothyroidism     DAYANARA (generalized anxiety disorder)     Tibialis posterior tendonitis, left     Allergic rhinitis     Anxiety     Migraine variant     Tremor     Binge eating disorder     ADHD (attention deficit hyperactivity disorder), inattentive type       Treatment Plan:  Patient Instructions   Start methylphenidate 5 mg twice daily.  You may increase the dose on your own up to a maximum of 30 mg twice daily.  Please contact me in approximately a week to discuss dosing adjustments.    Continue meditation practice.     Try to exercise 30 minutes 3 times per week.     Continue all other medications per your primary care provider.    Schedule an appointment with me in 6 weeks or sooner as needed.  You may call [facility] at [phone] to schedule.    [employer] Resources:      Go to the Emergency Department as needed or call after hours crisis line at [phone].      To schedule individual or family therapy, call [facility] at [phone].     Follow up with primary care provider as planned or sooner for acute medical concerns.    Call the psychiatric nurse line with medication questions or  concerns at [phone].    MyChart may be used to communicate with your provider, but this is not intended to be used for emergencies.    Community Resources:      National Suicide Prevention Lifeline: 848.399.3873 (TTY: 112.242.9599). Call anytime for help.  (www.suicidepreventionlifeline.org)    National [place] on Mental Illness (www.purvi.org): 933.200.8926 or 095-282-2247.     Mental Health Association (www.mentalhealth.org): 198.731.4993 or 588-957-4972.    [place] Crisis Text [place]: Text [place] to 331898    Suicide LifeLine Chat: suicidepreUSERJOY Technologyline.org/chat       Administrative Billing:   Video call duration: 26 minutes   Start time: 10:14 AM    Stop time: 10:40 AM  Additional telephone call duration: 18 minutes    Total time spent, including chart review and documentation: 54 minutes    Patient Status:  Patient will continue to be seen for ongoing consultation and stabilization.

## 2022-11-30 NOTE — PATIENT INSTRUCTIONS
Start methylphenidate 5 mg twice daily.  You may increase the dose on your own up to a maximum of 30 mg twice daily.  Please contact me in approximately a week to discuss dosing adjustments.    Continue meditation practice.     Try to exercise 30 minutes 3 times per week.     Continue all other medications per your primary care provider.    Schedule an appointment with me in 6 weeks or sooner as needed.  You may call [facility] at [phone] to schedule.    [place] Resources:    Go to the Emergency Department as needed or call after hours crisis line at [phone].    To schedule individual or family therapy, call [facility] at [phone].   Follow up with primary care provider as planned or sooner for acute medical concerns.  Call the psychiatric nurse line with medication questions or concerns at [phone].  [employer]unt may be used to communicate with your provider, but this is not intended to be used for emergencies.    Community Resources:    National Suicide Prevention Lifeline: 877.508.6651 (TTY: 968.698.2750). Call anytime for help.  (www.suicidepreventionlifeline.org)  National [place] on Mental Illness (www.purvi.org): 772.255.7749 or 980-130-6771.   Mental Health Association (www.mentalhealth.org): 129.537.2787 or 321-489-3796.  [place] Crisis Text Line: Text [place] to 379706  Suicide LifeLine Chat: suicidepreCrowd Supplyline.org/chat

## 2022-12-06 ENCOUNTER — MYC MEDICAL ADVICE (OUTPATIENT)
Dept: NUTRITION | Facility: CLINIC | Age: 42
End: 2022-12-06

## 2022-12-06 NOTE — TELEPHONE ENCOUNTER
"Updated meal plan after 12/6/22:     Breakfast: 1 svg (each): protein, fruit, starch, dairy, fat  Snack: 1/2 veg & 1 protein  Lunch: 3 protein, 1 starch, 2 veg, 1 fruit, and 1 fat (if my protein is lean.  If I eat a \"fatty\" protein like ham, then I don't have that extra fat serving.  I have a lot of tofu for lunch, so I usually do include the fat serving.)  Dinner: 3 protein, 1.5 starch, 2 veg, 2 fat (again, fat is omitted if my protein is \"fatty.\"  I almost never eat this extra fat serving with dinner.)  Snack: none  "

## 2022-12-07 ENCOUNTER — MYC MEDICAL ADVICE (OUTPATIENT)
Dept: PSYCHIATRY | Facility: CLINIC | Age: 42
End: 2022-12-07

## 2022-12-07 DIAGNOSIS — F90.0 ADHD (ATTENTION DEFICIT HYPERACTIVITY DISORDER), INATTENTIVE TYPE: Primary | ICD-10-CM

## 2022-12-08 RX ORDER — METHYLPHENIDATE HYDROCHLORIDE 36 MG/1
36 TABLET ORAL EVERY MORNING
Qty: 30 TABLET | Refills: 0 | Status: SHIPPED | OUTPATIENT
Start: 2022-12-08 | End: 2023-01-09

## 2022-12-08 NOTE — TELEPHONE ENCOUNTER
I spoke with haider by phone.  We agreed to change her stimulant to Concerta 36 mg daily.  She may supplement with a 5 mg methylphenidate at the end of the day if needed.    Stephany Neumann MD  [facility] Psychiat[facility]  [facility]

## 2022-12-26 ENCOUNTER — HEALTH MAINTENANCE LETTER (OUTPATIENT)
Age: 42
End: 2022-12-26

## 2023-01-09 ENCOUNTER — MYC REFILL (OUTPATIENT)
Dept: PSYCHIATRY | Facility: CLINIC | Age: 43
End: 2023-01-09

## 2023-01-09 DIAGNOSIS — F90.0 ADHD (ATTENTION DEFICIT HYPERACTIVITY DISORDER), INATTENTIVE TYPE: ICD-10-CM

## 2023-01-09 ASSESSMENT — ANXIETY QUESTIONNAIRES
GAD7 TOTAL SCORE: 7
8. IF YOU CHECKED OFF ANY PROBLEMS, HOW DIFFICULT HAVE THESE MADE IT FOR YOU TO DO YOUR WORK, TAKE CARE OF THINGS AT HOME, OR GET ALONG WITH OTHER PEOPLE?: SOMEWHAT DIFFICULT
2. NOT BEING ABLE TO STOP OR CONTROL WORRYING: SEVERAL DAYS
7. FEELING AFRAID AS IF SOMETHING AWFUL MIGHT HAPPEN: SEVERAL DAYS
3. WORRYING TOO MUCH ABOUT DIFFERENT THINGS: SEVERAL DAYS
1. FEELING NERVOUS, ANXIOUS, OR ON EDGE: SEVERAL DAYS
6. BECOMING EASILY ANNOYED OR IRRITABLE: SEVERAL DAYS
5. BEING SO RESTLESS THAT IT IS HARD TO SIT STILL: SEVERAL DAYS
4. TROUBLE RELAXING: SEVERAL DAYS
GAD7 TOTAL SCORE: 7
GAD7 TOTAL SCORE: 7
7. FEELING AFRAID AS IF SOMETHING AWFUL MIGHT HAPPEN: SEVERAL DAYS
IF YOU CHECKED OFF ANY PROBLEMS ON THIS QUESTIONNAIRE, HOW DIFFICULT HAVE THESE PROBLEMS MADE IT FOR YOU TO DO YOUR WORK, TAKE CARE OF THINGS AT HOME, OR GET ALONG WITH OTHER PEOPLE: SOMEWHAT DIFFICULT

## 2023-01-09 ASSESSMENT — PATIENT HEALTH QUESTIONNAIRE - PHQ9
10. IF YOU CHECKED OFF ANY PROBLEMS, HOW DIFFICULT HAVE THESE PROBLEMS MADE IT FOR YOU TO DO YOUR WORK, TAKE CARE OF THINGS AT HOME, OR GET ALONG WITH OTHER PEOPLE: SOMEWHAT DIFFICULT
SUM OF ALL RESPONSES TO PHQ QUESTIONS 1-9: 6
SUM OF ALL RESPONSES TO PHQ QUESTIONS 1-9: 6

## 2023-01-10 RX ORDER — METHYLPHENIDATE HYDROCHLORIDE 36 MG/1
36 TABLET ORAL EVERY MORNING
Qty: 30 TABLET | Refills: 0 | Status: SHIPPED | OUTPATIENT
Start: 2023-01-10 | End: 2023-01-11

## 2023-01-10 NOTE — TELEPHONE ENCOUNTER
Patient has appointment with provider tomorrow, however, should be out of this med per the last refill order date.    Date of Last Office Visit: 11/30/2022  Date of Next Office Visit: 1/10/2023  No shows since last visit: none  Cancellations since last visit: none    Medication requested: Methylphenidate 36 mg CR tablet Date last ordered: 12/8/2022 Qty: 30 Refills: 0    [place] of [place] ?: yes  Medication last filled date: 12/9/2022 Qty filled: 30  Other [place] substance on [place] ?: no  If yes, is this a new medication?: no  If yes, name of medication: no and date filled: no    Lapse in medication adherence greater than 5 days?: no  If yes, call patient and gather details: no  Medication refill request verified as identical to current order?: yes  Result of Last DAM, VPA, Li+ Level, CBC, or Carbamazepine Level (at or since last visit): see labs in chart.    Last visit treatment plan: Treatment Plan:  Patient Instructions   Start methylphenidate 5 mg twice daily.  You may increase the dose on your own up to a maximum of 30 mg twice daily.  Please contact me in approximately a week to discuss dosing adjustments.     Continue meditation practice.     Try to exercise 30 minutes 3 times per week.     Continue all other medications per your primary care provider.     Schedule an appointment with me in 6 weeks or sooner as needed.  You may call [facility] at [phone] to schedule.     [place] Resources:       Go to the Emergency Department as needed or call after hours crisis line at [phone].      To schedule individual or family therapy, call [facility] at [phone].     Follow up with primary care provider as planned or sooner for acute medical concerns.    Call the psychiatric nurse line with medication questions or concerns at [phone].    [employer] may be used to communicate with your provider, but this is not intended to be used for emergencies.     Community  Resources:      National Suicide Prevention Lifeline: 806.278.7576 (TTY: 697.835.9164). Call anytime for help.  (www.suicidepreventionlifeline.org)    National [place] on Mental Illness (www.purvi.org): 868.416.4284 or 909-044-3886.     Mental Health Association (www.mentalhealth.org): 803.928.6401 or 680-305-5129.    [place] Crisis Text Line: Text [place] to 220561    Suicide LifeLine Chat: [employer].org/chat        Administrative Billing:   Video call duration: 26 minutes              Start time: 10:14 AM                 Stop time: 10:40 AM  Additional telephone call duration: 18 minutes     Total time spent, including chart review and documentation: 54 minutes     Patient Status:  Patient will continue to be seen for ongoing consultation and stabilization      []Medication refilled per  Medication Refill in Ambulatory Care  policy.  [x]Medication unable to be refilled by RN due to criteria not met as indicated below:    []Eligibility - not seen in the last year   []Supervision - no future appointment   []Compliance - no shows, cancellations or lapse in therapy   []Verification - order discrepancy   [x]Controlled medication   [x]Medication not included in policy   []90-day supply request   []Other

## 2023-01-11 ENCOUNTER — VIRTUAL VISIT (OUTPATIENT)
Dept: BEHAVIORAL HEALTH | Facility: CLINIC | Age: 43
End: 2023-01-11
Payer: COMMERCIAL

## 2023-01-11 ENCOUNTER — VIRTUAL VISIT (OUTPATIENT)
Dept: PSYCHIATRY | Facility: CLINIC | Age: 43
End: 2023-01-11
Payer: COMMERCIAL

## 2023-01-11 DIAGNOSIS — F90.0 ADHD (ATTENTION DEFICIT HYPERACTIVITY DISORDER), INATTENTIVE TYPE: Primary | ICD-10-CM

## 2023-01-11 DIAGNOSIS — F33.0 MILD EPISODE OF RECURRENT MAJOR DEPRESSIVE DISORDER (H): ICD-10-CM

## 2023-01-11 DIAGNOSIS — F41.9 ANXIETY DISORDER, UNSPECIFIED TYPE: ICD-10-CM

## 2023-01-11 PROCEDURE — 90832 PSYTX W PT 30 MINUTES: CPT | Mod: 95 | Performed by: SOCIAL WORKER

## 2023-01-11 PROCEDURE — 99215 OFFICE O/P EST HI 40 MIN: CPT | Mod: 95 | Performed by: PSYCHIATRY & NEUROLOGY

## 2023-01-11 RX ORDER — METHYLPHENIDATE HYDROCHLORIDE 45 MG/1
45 TABLET, EXTENDED RELEASE ORAL DAILY
Qty: 30 TABLET | Refills: 0 | Status: SHIPPED | OUTPATIENT
Start: 2023-03-14 | End: 2023-01-11

## 2023-01-11 RX ORDER — METHYLPHENIDATE HYDROCHLORIDE 18 MG/1
18 TABLET ORAL DAILY
Qty: 30 TABLET | Refills: 0 | Status: SHIPPED | OUTPATIENT
Start: 2023-03-14 | End: 2023-01-23 | Stop reason: DRUGHIGH

## 2023-01-11 RX ORDER — METHYLPHENIDATE HYDROCHLORIDE 27 MG/1
27 TABLET ORAL DAILY
Qty: 30 TABLET | Refills: 0 | Status: SHIPPED | OUTPATIENT
Start: 2023-03-14 | End: 2023-01-23 | Stop reason: DRUGHIGH

## 2023-01-11 RX ORDER — METHYLPHENIDATE HYDROCHLORIDE 18 MG/1
18 TABLET ORAL DAILY
Qty: 30 TABLET | Refills: 0 | Status: SHIPPED | OUTPATIENT
Start: 2023-01-11 | End: 2023-01-23 | Stop reason: DRUGHIGH

## 2023-01-11 RX ORDER — METHYLPHENIDATE HYDROCHLORIDE 45 MG/1
45 TABLET, EXTENDED RELEASE ORAL DAILY
Qty: 30 TABLET | Refills: 0 | Status: SHIPPED | OUTPATIENT
Start: 2023-02-11 | End: 2023-01-11

## 2023-01-11 RX ORDER — METHYLPHENIDATE HYDROCHLORIDE 45 MG/1
45 TABLET, EXTENDED RELEASE ORAL DAILY
Qty: 30 TABLET | Refills: 0 | Status: CANCELLED | OUTPATIENT
Start: 2023-01-11

## 2023-01-11 RX ORDER — METHYLPHENIDATE HYDROCHLORIDE 45 MG/1
45 TABLET, EXTENDED RELEASE ORAL DAILY
Qty: 30 TABLET | Refills: 0 | Status: SHIPPED | OUTPATIENT
Start: 2023-01-11 | End: 2023-01-11

## 2023-01-11 RX ORDER — METHYLPHENIDATE HYDROCHLORIDE 27 MG/1
27 TABLET ORAL DAILY
Qty: 30 TABLET | Refills: 0 | Status: SHIPPED | OUTPATIENT
Start: 2023-01-11 | End: 2023-01-23 | Stop reason: DRUGHIGH

## 2023-01-11 RX ORDER — METHYLPHENIDATE HYDROCHLORIDE 18 MG/1
18 TABLET ORAL DAILY
Qty: 30 TABLET | Refills: 0 | Status: SHIPPED | OUTPATIENT
Start: 2023-02-11 | End: 2023-01-23 | Stop reason: DRUGHIGH

## 2023-01-11 RX ORDER — METHYLPHENIDATE HYDROCHLORIDE 27 MG/1
27 TABLET ORAL DAILY
Qty: 30 TABLET | Refills: 0 | Status: SHIPPED | OUTPATIENT
Start: 2023-02-11 | End: 2023-01-23 | Stop reason: DRUGHIGH

## 2023-01-11 NOTE — PROGRESS NOTES
[facility] Post-Appointment Chart -check      Medications ordered this visit were e-scribed.  Verified by order class [x] yes  [] no    List Medications:  methylphenidate HCl ER (CONCERTA) 18 MG CR tablet; methylphenidate (CONCERTA) 27 MG CR tablet POST DATED 2/11/2023: methylphenidate HCl ER (CONCERTA) 18 MG CR tablet; methylphenidate (CONCERTA) 27 MG CR tablet 03/14/2023: methylphenidate HCl ER (CONCERTA) 18 MG CR tablet; methylphenidate (CONCERTA) 27 MG CR tablet      Medication changes or discontinuations were communicated to patient's pharmacy: [x] yes  [] no E-Canceled   UA collected [] yes  [] no  [x] n/a-virtual     Outside referrals / labs, etc support staff to follow up: [] yes  [x] no    Future appointment was made: [x] yes  [] no  [] n/a  Future Appointments 1/11/2023 - 7/10/2023      Date Visit Type Length Department Provider     2/27/2023  9:30 AM [facility] ANNUAL PHYSICAL 30 min RD OB/GYN Keena Lieberman MD              4/3/2023  8:00 AM [facility] RETURN 30 min [facility] BE[facility]AVIORAL Ibis Sierra MSW    Location Instructions:     [facility] has 2 buildings on its campus. Please visit us at [address] [place] [address] and enter the building with the numbers [id number] on it.               4/3/2023  [facility] Elissa Neumann MD    Location Instructions:     [facility] has 2 buildings on its campus. Please visit us at [address] [place] [address] and enter the building with the numbers [id number] on it.                    Dictation completed at time of chart check: [x] yes  [] no    I have checked the documentation for today s encounters and the above information has been reviewed and completed.      Lina Santoyo CMA on January 11, 2023 at 4:08 PM

## 2023-01-11 NOTE — PATIENT INSTRUCTIONS
Increase methylphenidate CR to 45 mg daily. If you prefer to return to your previous dose, please contact me for a new prescription.     Continue all other medications per your primary care provider.    Schedule an appointment with me in 3 months or sooner as needed.  You may call [facility] at [phone] to schedule.    [place] Resources:    Go to the Emergency Department as needed or call after hours crisis line at [phone].    To schedule individual or family therapy, call [facility] at [phone].   Follow up with primary care provider as planned or sooner for acute medical concerns.  Call the psychiatric nurse line with medication questions or concerns at [phone].  [employer] may be used to communicate with your provider, but this is not intended to be used for emergencies.    Community Resources:    National Suicide Prevention Lifeline: 533.774.5703 (TTY: 390.188.4176). Call anytime for help.  (www.suicidepreventionlifeline.org)  National [place] on Mental Illness (www.purvi.org): 188.914.1046 or 169-399-1079.   Mental Health Association (www.mentalhealth.org): 611.220.8625 or 038-715-6917.  [place] Crisis Text Line: Text [place] to 085929  Suicide LifeLine Chat: suicidepreventionlifeline.org/chat

## 2023-01-11 NOTE — PROGRESS NOTES
[facility]  2023      Behavioral Health Clinician Progress Note    Patient Name: Keena Tobias           Service Type:  Individual      Service Location:   [facility] / Email (patient reached)     Session Start Time: 10:40a  Session End Time: 11:04a      Session Length: 16 - 37      Attendees: Client     Service Modality:  Video Visit:      Provider verified identity through the following two step process.  Patient provided:  Patient  and Patient was verified at admission/transfer    Telemedicine Visit: The patient's condition can be safely assessed and treated via synchronous audio and visual telemedicine encounter.      Reason for Telemedicine Visit: Services only offered telehealth    Originating Site (Patient Location): Patient's home    Distant Site (Provider Location): Provider Remote Setting- Home Office    Consent:  The patient/guardian has verbally consented to: the potential risks and benefits of telemedicine (video visit) versus in person care; bill my insurance or make self-payment for services provided; and responsibility for payment of non-covered services.     Patient would like the video invitation sent by:  My Chart    Mode of Communication:  Video Conference via [employer]    Distant Location (Provider):  Off-site    As the provider I attest to compliance with applicable laws and regulations related to telemedicine.    Visit Activities (Refresh list every visit): [facility] Only    Diagnostic Assessment Date: 22  Treatment Plan Review Date: 3/1/23  See Flowsheets for today's PHQ-9 and DAYANARA-7 results  Previous PHQ-9:   PHQ-9 SCORE 2022   PHQ-9 Total Score - - -   PHQ-9 Total Score MyChart - 26 (Severe depression) 6 (Mild depression)   PHQ-9 Total Score 26 26 6     Previous DAYANARA-7:   DAYANARA-7 SCORE 2022   Total Score - - -   Total Score 16 (severe anxiety) 21 (severe anxiety) 7 (mild  "anxiety)   Total Score 16 21 7       SHIRA LEVEL:  SHIRA Score (Last Two) 4/21/2011   SHIRA Raw Score 50   Activation Score 86.3   SHIRA Level 4       DATA  Extended Session (60+ minutes): No  Interactive Complexity: No  Crisis: No  [facility] Patient: No    Treatment Objective(s) Addressed in This Session:  Target Behavior(s): disease management/lifestyle changes ADHD, depression    Depressed Mood: Increase interest, engagement, and pleasure in doing things  Feel less tired and more energy during the day   Anxiety: will experience a reduction in anxiety  Attention Problems: will develop coping skills to effectively manage attention issues    Current Stressors / Issues:  Reports that she is doing \"great\".  Feels her mood is much more regulated.  Shares that the way she thinks about things feels different.  Reports not having constant pressure in her chest.  Feels the concerta during the day is much better than the ritalin 2x/day and having the ritalin in the evening has been very helpful.    Struggling less at work although does feel that she is wanting to pursue something different and is in interview process currently.    DAYANARA and PHQ scores decreased drastically since last visit.  Is being more mindful of anxiety and depression symptoms and finds it seems more situational in nature.        Progress on Treatment Objective(s) / Homework:  Minimal progress - ACTION (Actively working towards change); Intervened by reinforcing change plan / affirming steps taken    Motivational Interviewing    MI Intervention: Supported Autonomy, Collaboration, Evocation, Permission to raise concern or advise, Open-ended questions and Reflections: simple and complex     Change Talk Expressed by the Patient: Activation    Provider Response to Change Talk: E - Evoked more info from patient about behavior change, A - Affirmed patient's thoughts, decisions, or attempts at behavior change, R - Reflected patient's change talk and S - Summarized patient's " change talk statements      Care Plan review completed: Yes    Medication Review:  No changes to current psychiatric medication(s)    Medication Compliance:  Yes    Changes in Health Issues:   None reported    Chemical Use Review:   Substance Use: Chemical use reviewed, no active concerns identified      Tobacco Use: No current tobacco use.      Assessment: Current Emotional / Mental Status (status of significant symptoms):  Risk status (Self / Other harm or suicidal ideation)  [place] Suicide Severity Rating Scale (Lifetime/Recent)  [place] Suicide Severity Rating (Lifetime/Recent) 4/16/2020 4/16/2020   Wish to be Dead (Lifetime) Yes Yes   Comments What if I crashed in to the divider in the highway, car accident at 70 mph. Wouldn't it be nice not to be here.   Non-Specific Active Suicidal Thoughts (Lifetime) Yes Yes   Non-Specific Active Suicidal Thought Description (Lifetime) Driving in to the highway median. Wouldn't it be nice not to be here.   Most Severe Ideation Rating (Lifetime) - 2   Most Severe Ideation Description (Lifetime) - Driving in to the highway median.   Frequency (Lifetime) - NA   Duration (Lifetime) - NA   Controllability (Lifetime) - NA   Protective Factors  (Lifetime) - 2   Reasons for Ideation (Lifetime) - 4   RETIRED: 1. Wish to be Dead (Recent) Yes Yes   RETIRED: Wish to be Dead Description (Recent) Wouldn't it be nice not to wake up or be here. Wouldn't it be nice not to be here.   RETIRED: 2. Non-Specific Active Suicidal Thoughts (Recent) No Yes   Non-Specific Active Suicidal Thought Description (Recent) - Wouldn't it be nice not to be here.   3. Active Suicidal Ideation with any Methods (Not Plan) Without Intent to Act (Lifetime) - Yes   RETIRE: Active Suicidal Ideation with any Methods (Not Plan) Description (Lifetime) - Driving in to the highway median.   RETIRED: 3. Active Suicidal Ideation with any Methods (Not Plan) Without Intent to Act (Recent) - Yes   RETIRED: Active Suicidal  Ideation with any Methods (Not Plan) Description (Recent) - No specific ideas or plan   RETIRE: 4. Active Suicidal Ideation with Some Intent to Act, Without Specific Plan (Lifetime) - No   4. Active Suicidal Ideation with Some Intent to Act, Without Specific Plan (Recent) - No   RETIRE: 5. Active Suicidal Ideation with Specific Plan and Intent (Lifetime) - No   RETIRED: 5. Active Suicidal Ideation with Specific Plan and Intent (Recent) - No   Most Severe Ideation Rating (Past Month) - 2   Most Severe Ideation Description (Past Month) - Wouldn't it be nice not to be here.   Frequency (Past Month) - 1   Duration (Past Month) - 1   Controllability (Past Month) - 2   Protective Factors (Past Month) - 1   Reasons for Ideation (Past Month) - 0   Actual Attempt (Lifetime) - No   Actual Attempt (Past 3 Months) - No   Has subject engaged in non-suicidal self-injurious behavior? (Lifetime) - No   Has subject engaged in non-suicidal self-injurious behavior? (Past 3 Months) - No   Interrupted Attempts (Lifetime) - No   Interrupted Attempts (Past 3 Months) - No   Aborted or Self-Interrupted Attempt (Lifetime) - No   Aborted or Self-Interrupted Attempt (Past 3 Months) - No   Preparatory Acts or Behavior (Lifetime) - No   Preparatory Acts or Behavior (Past 3 Months) - No   Most Recent Attempt Actual Lethality Code - NA   Most Lethal Attempt Actual Lethality Code - NA   Initial/First Attempt Actual Lethality Code - NA     Patient denies a history of suicidal ideation, suicide attempts, self-injurious behavior, homicidal ideation, homicidal behavior and and other safety concerns  Patient denies current fears or concerns for personal safety.  Patient denies current or recent suicidal ideation or behaviors.  Patient denies current or recent homicidal ideation or behaviors.  Patient denies current or recent self injurious behavior or ideation.  Patient denies other safety concerns.  A safety and risk management plan has not been  developed at this time, however patient was encouraged to call [place] [phone] should there be a change in any of these risk factors.    Appearance:   Appropriate   Eye Contact:   Good   Psychomotor Behavior: Normal   Attitude:   Cooperative   Orientation:   All  Speech   Rate / Production: Normal/ Responsive Normal    Volume:  Normal   Mood:    Normal  Affect:    Appropriate   Thought Content:  Clear   Thought Form:  Coherent  Logical   Insight:    Good     Diagnoses:  1. ADHD (attention deficit hyperactivity disorder), inattentive type    2. Anxiety disorder, unspecified type    3. Mild episode of recurrent major depressive disorder (H)        Collateral Reports Completed:  Communicated with: [facility] Psychiat[facility]    Plan: (Homework, other):  Patient was given information about behavioral services and encouraged to schedule a follow up appointment with the clinic [facility] as needed.  She was also given information about mental health symptoms and treatment options .  CD Recommendations: No indications of CD issues.  Ibis Sierra, [facility], [facility]      ______________________________________________________________________    Integrated Primary Care Behavioral Health Treatment Plan    Patient's Name: Keena Tobias  YOB: 1980    Date of Creation: 11/30/22  Date Treatment Plan Last Reviewed/Revised: 11/30/22    DSM5 Diagnoses: Attention-Deficit/Hyperactivity Disorder  314.00 (F90.0) Predominantly inattentive presentation, 296.32 (F33.1) Major Depressive Disorder, Recurrent Episode, Moderate _ and With mixed features or 300.00 (F41.9) Unspecified Anxiety Disorder  Psychosocial / Contextual Factors: work stress,   PROMIS (reviewed every 90 days): 24    Referral / Collaboration:  Referral to another professional/service is not indicated at this time.    Anticipated number of session for this episode of care: 3-5  Anticipation frequency of session: Every 1-2 months  Anticipated Duration of each  session: 16-37 minutes  Treatment plan will be reviewed in 90 days or when goals have been changed.       MeasurableTreatment Goal(s) related to diagnosis / functional impairment(s)  Goal 1: Patient will experience a reduction in depressive symptoms along with a corresponding increase in positive emotion and life satisfaction.    I will know I've met my goal when everything doesn't feel so difficult.      Objective #A (Patient Action)    Patient will Increase interest, engagement, and pleasure in doing things  Feel less tired and more energy during the day .  Status: New - Date: 11/30/22     Intervention(s)  Therapist will utilize CBT and ACT aimed to help reduce depression and anxiety.        Patient has reviewed and agreed to the above plan.      Ibis Sierra, JOI, LICSW, [facility]  January 11, 2023

## 2023-01-11 NOTE — PROGRESS NOTES
Telemedicine Visit: The patient's condition can be safely assessed and treated via synchronous audio and visual telemedicine encounter.      Reason for Telemedicine Visit: Services only offered telehealth    Originating Site (Patient Location): Patient's home    Distant Site (Provider Location): Provider Remote Setting- Home Office    Consent:  The patient/guardian has verbally consented to: the potential risks and benefits of telemedicine (video visit) versus in person care; bill my insurance or make self-payment for services provided; and responsibility for payment of non-covered services.     Mode of Communication:  Video Conference via Wheebox    As the provider I attest to compliance with applicable laws and regulations related to telemedicine.      How would you like to obtain your AVS? MyChart  If the video visit is dropped, the invitation should be resent by: Text to cell phone: [phone]  Will anyone else be joining your video visit? No          Outpatient Psychiatric Progress Note    Name: Keena Vincent Tobias   : 1980                    Primary Care Provider: Silvana Patton DO   Therapist: None     PHQ-9 scores:  PHQ-9 SCORE 2022   PHQ-9 Total Score - - -   PHQ-9 Total Score MyChart - 26 (Severe depression) 6 (Mild depression)   PHQ-9 Total Score 26 26 6       DAYANARA-7 scores:  DAYANARA-7 SCORE 2022   Total Score - - -   Total Score 16 (severe anxiety) 21 (severe anxiety) 7 (mild anxiety)   Total Score 16 21 7   Answers for HPI/ROS submitted by the patient on 2023  If you checked off any problems, how difficult have these problems made it for you to do your work, take care of things at home, or get along with other people?: Somewhat difficult  PHQ9 TOTAL SCORE: 6  DAYANARA 7 TOTAL SCORE: 7      Patient Identification:  Keena is a 42 year old year old female  who presents for return visit with me.  Patient attended the session alone.  "    Interim History:  Per Ibis Mariela, [facility]:  Reports that she is doing \"great\".  Feels her mood is much more regulated.  Shares that the way she thinks about things feels different.  Reports not having constant pressure in her chest.  Feels the concerta during the day is much better than the ritalin 2x/day and having the ritalin in the evening has been very helpful.    Struggling less at work although does feel that she is wanting to pursue something different and is in interview process currently.    DAYANARA and PHQ scores decreased drastically since last visit.  Is being more mindful of anxiety and depression symptoms and finds it seems more situational in nature.     Keena is doing well.  She rates her mood today as 8 out of 10 with 10 being the best.  She was pleasantly surprised by her scores on the PHQ-9 and DAYANARA screenings, and reports that \"they have never been that low.\"    She denies any adverse side effects from her Concerta.  She prefers it to the immediate release Ritalin, as she feels the \"ups and downs\" when taking multiple doses of immediate release.  She does say that her \"best days\" I the days that she takes an additional 5 mg of immediate release at the end of the day in addition to her 36 mg of Concerta.  She denies any irritability, she feels calm, collected, and \"chill.\"  She still is able to feel some anxiety at times but feels that it is appropriate.  After some discussion fashion, we agreed to increase her Concerta slightly to 45 mg daily.  If she develops adverse side effects, she can certainly contact me and we will decrease her dose back to 36 mg a day and add in the 5 mg tablets of immediate release to be taken as needed.    She is not currently taking any medications for anxiety or depression.  We agreed to continue without them for now given the improvement in her mood and anxiety since treating attention deficit disorder.  We will meet again in approximately 3 months to be sure " that her mood and anxiety continued to be stable.  She can call in the interim if needed.       Vital Signs:   There were no vitals taken for this visit.      Current Medications:  Current Outpatient Medications   Medication     adapalene (DIFFERIN) 0.3 % external gel     clindamycin (CLEOCIN T) 1 % external lotion     levothyroxine (SYNTHROID/LEVOTHROID) 175 MCG tablet     methylphenidate (CONCERTA) 36 MG CR tablet     methylphenidate 45 MG TBCR     [START ON 2/11/2023] methylphenidate 45 MG TBCR     [START ON 3/14/2023] methylphenidate 45 MG TBCR     vitamin D3 (CHOLECALCIFEROL) 50 mcg (2000 units) tablet     No current facility-administered medications for this visit.        The [place] Prescription Monitoring Program has been reviewed and there are no concerns about diversionary activity for controlled substances at this time.      Mental Status Examination:  Keena is a 42-year-old woman in no acute distress.  She is neatly groomed in casual clothing.  Speech is clear and normal in rate and tone.  Eye contact is good on the video connection.  Affect is full.  Mood is euthymic.  Thoughts are logical and spontaneous with no loose associations or flight of ideas.  Thought content shows no psychosis.  No suicidal thoughts.  She is alert and oriented x3.    Assessment and Plan:    ICD-10-CM    1. ADHD (attention deficit hyperactivity disorder), inattentive type  F90.0 methylphenidate 45 MG TBCR     methylphenidate 45 MG TBCR     methylphenidate 45 MG TBCR          Medical comorbidities include:   Patient Active Problem List   Diagnosis     Varicose veins of legs     CARDIOVASCULAR SCREENING; LDL GOAL LESS THAN 160     Moderate major depression (H)     Graves disease     Compulsive overeating     Palpitations     Acne     Postoperative hypothyroidism     DAYANARA (generalized anxiety disorder)     Tibialis posterior tendonitis, left     Allergic rhinitis     Anxiety     Migraine variant     Tremor     Binge eating  disorder     ADHD (attention deficit hyperactivity disorder), inattentive type       Treatment Plan:  Patient Instructions   Increase methylphenidate CR to 45 mg daily. If you prefer to return to your previous dose, please contact me for a new prescription.     Continue all other medications per your primary care provider.    Schedule an appointment with me in 3 months or sooner as needed.  You may call [facility] at [phone] to schedule.    [facility] Resources:      Go to the Emergency Department as needed or call after hours crisis line at [phone].      To schedule individual or family therapy, call [facility] at [phone].     Follow up with primary care provider as planned or sooner for acute medical concerns.    Call the psychiatric nurse line with medication questions or concerns at [phone].    UserMojo may be used to communicate with your provider, but this is not intended to be used for emergencies.    Community Resources:      National Suicide Prevention Lifeline: 348.824.6869 (TTY: 671.386.9137). Call anytime for help.  (www.suicidepreventionlifeline.org)    National [place] on Mental Illness (www.purvi.org): 909-931-5888 or 378-450-6265.     Mental Health Association (www.mentalhealth.org): 497.405.6976 or 223-156-8412.    [place] Crisis Text [place]: Text [place] to 888681    Suicide LifeLine Chat: suicidepreSolvateline.org/chat         Administrative Billing:   Video call duration: 32 minutes   Start: 11:25 AM   Stop: 11:57 AM  Total time spent, including chart review and documentation: 42 minutes    Patient Status:  Patient will continue to be seen for ongoing consultation and stabilization.

## 2023-01-11 NOTE — TELEPHONE ENCOUNTER
We received three electronic prescriptions for concerta 45mg tablets today.  Even though Epic has the drug strength in its database it does not come as one single 45mg tablet.  The prescription would unfortunately have to be sent as 18mg and 27mg to equal the total 45mg dosage due to its controlled status and the legal requirements.  If you would please resend the prescriptions as two different strengths for each month that would be greatly appreciated.    Thanks  Papito Carlin RP.  [facility]   [phone]  [email or web address]

## 2023-01-11 NOTE — TELEPHONE ENCOUNTER
New scripts supplied as requested.    Stephany Neumann MD  Collaborative Care Psychiatry  [facility]

## 2023-01-13 ENCOUNTER — TRANSFERRED RECORDS (OUTPATIENT)
Dept: HEALTH INFORMATION MANAGEMENT | Facility: CLINIC | Age: 43
End: 2023-01-13

## 2023-02-08 ENCOUNTER — LAB (OUTPATIENT)
Dept: LAB | Facility: HOSPITAL | Age: 43
End: 2023-02-08
Payer: COMMERCIAL

## 2023-02-08 DIAGNOSIS — E89.0 POSTSURGICAL HYPOTHYROIDISM: Primary | ICD-10-CM

## 2023-02-08 LAB
T4 FREE SERPL-MCNC: 1.76 NG/DL (ref 0.9–1.7)
TSH SERPL DL<=0.005 MIU/L-ACNC: 0.23 UIU/ML (ref 0.3–4.2)

## 2023-02-08 PROCEDURE — 36415 COLL VENOUS BLD VENIPUNCTURE: CPT

## 2023-02-08 PROCEDURE — 84443 ASSAY THYROID STIM HORMONE: CPT

## 2023-02-08 PROCEDURE — 84439 ASSAY OF FREE THYROXINE: CPT

## 2023-04-04 ENCOUNTER — VIRTUAL VISIT (OUTPATIENT)
Dept: PSYCHIATRY | Facility: CLINIC | Age: 43
End: 2023-04-04
Payer: COMMERCIAL

## 2023-04-04 DIAGNOSIS — F90.0 ADHD (ATTENTION DEFICIT HYPERACTIVITY DISORDER), INATTENTIVE TYPE: Primary | ICD-10-CM

## 2023-04-04 PROCEDURE — 99213 OFFICE O/P EST LOW 20 MIN: CPT | Mod: VID | Performed by: PSYCHIATRY & NEUROLOGY

## 2023-04-04 RX ORDER — METHYLPHENIDATE HYDROCHLORIDE 36 MG/1
36 TABLET ORAL DAILY
Qty: 30 TABLET | Refills: 0 | Status: SHIPPED | OUTPATIENT
Start: 2023-05-05 | End: 2023-06-04

## 2023-04-04 RX ORDER — METHYLPHENIDATE HYDROCHLORIDE 5 MG/1
5 TABLET ORAL DAILY
Qty: 30 TABLET | Refills: 0 | Status: SHIPPED | OUTPATIENT
Start: 2023-04-04 | End: 2023-05-04

## 2023-04-04 RX ORDER — METHYLPHENIDATE HYDROCHLORIDE 5 MG/1
5 TABLET ORAL DAILY
Qty: 30 TABLET | Refills: 0 | Status: SHIPPED | OUTPATIENT
Start: 2023-06-05 | End: 2023-07-05

## 2023-04-04 RX ORDER — METHYLPHENIDATE HYDROCHLORIDE 36 MG/1
36 TABLET ORAL DAILY
Qty: 30 TABLET | Refills: 0 | Status: SHIPPED | OUTPATIENT
Start: 2023-04-04 | End: 2023-05-04

## 2023-04-04 RX ORDER — METHYLPHENIDATE HYDROCHLORIDE 5 MG/1
5 TABLET ORAL DAILY
Qty: 30 TABLET | Refills: 0 | Status: SHIPPED | OUTPATIENT
Start: 2023-05-05 | End: 2023-06-04

## 2023-04-04 RX ORDER — METHYLPHENIDATE HYDROCHLORIDE 36 MG/1
36 TABLET ORAL DAILY
Qty: 30 TABLET | Refills: 0 | Status: SHIPPED | OUTPATIENT
Start: 2023-06-05 | End: 2023-07-05

## 2023-04-04 ASSESSMENT — PATIENT HEALTH QUESTIONNAIRE - PHQ9
10. IF YOU CHECKED OFF ANY PROBLEMS, HOW DIFFICULT HAVE THESE PROBLEMS MADE IT FOR YOU TO DO YOUR WORK, TAKE CARE OF THINGS AT HOME, OR GET ALONG WITH OTHER PEOPLE: NOT DIFFICULT AT ALL
SUM OF ALL RESPONSES TO PHQ QUESTIONS 1-9: 8
SUM OF ALL RESPONSES TO PHQ QUESTIONS 1-9: 8

## 2023-04-04 NOTE — PATIENT INSTRUCTIONS
Continue methylphenidate CR 36 mg daily in the morning and 5 mg immediate release in the afternoon.      Continue all other medications per your primary care provider.    Per our conversation, you are graduating from the Collaborative Care Psychiatry program back to the care of your primary care provider.  Please follow up with them in three to six months.  All future refill requests should go to them.    It has been a pleasure working with you, best wishes for the future!      [facility] Resources:    Go to the Emergency Department as needed or call after hours crisis line at [phone].    To schedule individual or family therapy, call [facility] at [phone].   Follow up with primary care provider as planned or sooner for acute medical concerns.  Call the psychiatric nurse line with medication questions or concerns at [phone].  Pymetricst may be used to communicate with your provider, but this is not intended to be used for emergencies.    Community Resources:    National Suicide Prevention Lifeline: 972.589.5245 (TTY: 261.965.3327). Call anytime for help.  (www.suicidepreventionlifeline.org)  National [place] on Mental Illness (www.purvi.org): 274.833.7696 or 092-449-0877.   Mental Health Association (www.mentalhealth.org): 351.919.9473 or 471-136-1527.  [place] Crisis Text [place]: Text [place] to 728685  Suicide LifeLine Chat: suicidepreventionlifeline.org/chat    Patient Education   Collaborative Care Psychiatry Service  What to Expect  Here's what to expect from your Collaborative Care Psychiatry Service (CCPS).   About CCPS  CCPS means 2 people work together to help you get better. You'll meet with a behavioral health clinician and a psychiatric doctor. A behavioral health clinician helps people with mental health problems by talking with them. A psychiatric doctor helps people by giving them medicine.  How it works  At every visit, you'll see the behavioral health clinician (BHC) first.  "They'll talk with you about how you're doing and teach you how to feel better.   Then you'll see the psychiatric doctor. This doctor can help you deal with troubling thoughts and feelings by giving you medicine. They'll make sure you know the plan for your care.   CCPS usually takes 3 to 6 visits. If you need more visits, we may have you start seeing a different psychiatric doctor for ongoing care.  If you have any questions or concerns, we'll be glad to talk with you.  About visits  Be open  At your visits, please talk openly about your problems. It may feel hard, but it's the best way for us to help you.  Cancelling visits  If you can't come to your visit, please call us right away at [phone]. If you don't cancel at least 24 hours (1 full day) before your visit, that's \"late cancellation.\"  Being late to visits  Being very late is the same as not showing up. You will be a \"no show\" if:  Your appointment starts with a [facility], and you're more than 15 minutes late for a 30-minute (half hour) visit. This will also cancel your appointment with the psychiatric doctor.  Your appointment is with a psychiatric doctor only, and you're more than 15 minutes late for a 30-minute (half hour) visit.  Your appointment is with a psychiatric doctor only, and you're more than 30 minutes late for a 60-minute (full hour) visit.  If you cancel late or don't show up 2 times within 6 months, we may end your care.   Getting help between visits  If you need help between visits, you can call us Monday to Friday from 8 a.m. to 4:30 p.m. at [phone].  Emergency care  Call 911 or go to the nearest emergency department if your life or someone else's life is in danger.  Call 388 anytime to reach the national Suicide and Crisis hotline.  Medicine refills  To refill your medicine, call your pharmacy. You can also call [facility]'s Behavioral Access at [phone], Monday to Friday, 8 a.m. to 4:30 p.m. It can take 1 to 3 " business days to get a refill.   Forms, letters, and tests  You may have papers to fill out, like FMLA, short-term disability, and workability. We can help you with these forms at your visits, but you must have an appointment. You may need more than 1 visit for this, to be in an intensive therapy program, or both.  Before we can give you medicine for ADHD, we may refer you to get tested for it or confirm it another way.  We may not be able to give you an emotional support animal letter.  We don't do mental health checks ordered by the court.   We don't do mental health testing, but we can refer you to get tested.   Thank you for choosing us for your care.  For informational purposes only. Not to replace the advice of your health care provider. Copyright   2022 [facility]. All rights reserved. [employer] [id number] - 12/22.

## 2023-04-04 NOTE — PROGRESS NOTES
Telemedicine Visit: The patient's condition can be safely assessed and treated via synchronous audio and visual telemedicine encounter.      Reason for Telemedicine Visit: Services only offered telehealth    Originating Site (Patient Location): Patient's home    Distant Site (Provider Location): Provider Remote Setting- Home Office    Consent:  The patient/guardian has verbally consented to: the potential risks and benefits of telemedicine (video visit) versus in person care; bill my insurance or make self-payment for services provided; and responsibility for payment of non-covered services.     Mode of Communication:  Video Conference via Pursway    As the provider I attest to compliance with applicable laws and regulations related to telemedicine.      How would you like to obtain your AVS? MyChart  If the video visit is dropped, the invitation should be resent by: Text to cell phone: [phone]  Will anyone else be joining your video visit? No           Outpatient Psychiatric Progress Note    Name: Keena Tobias   : 1980                    Primary Care Provider: Silvana Patton DO   Therapist: None     PHQ-9 scores: completing at home      2022    12:06 PM 2023    10:59 AM 2023     9:35 AM   PHQ-9 SCORE   PHQ-9 Total Score MyChart 26 (Severe depression) 6 (Mild depression) 8 (Mild depression)   PHQ-9 Total Score 26    26 6 8       DAYANARA-7 scores: completing at home      2022     6:57 PM 2023    11:00 AM 3/28/2023     3:08 PM   DAYANARA-7 SCORE   Total Score 21 (severe anxiety) 7 (mild anxiety) 8 (mild anxiety)   Total Score 21 7 8       Patient Identification:  Keena is a 43 year old year old female  who presents for return visit with me.  Patient attended the session alone.     Interim History:  Keena reports that she has been doing very well.  She denies anxiety or depression.    She recently started a new job and reports that it has been a very positive change  in her life.  It seems to be a better fit for her and is more challenging, which she enjoys.    She denies any adverse side effects with her current medications and reports that her current dose seems to be appropriate.  She has to be mindful of timing of the afternoon dose, or it interferes with her ability to sleep.    She is comfortable being return to her primary care provider for ongoing management of her psychotropic medications.  She was informed that I will be leaving [place] as of June 1.    Vital Signs:   LMP 03/25/2023     Labs:  TSH   Date Value Ref Range Status   02/08/2023 0.23 (L) 0.30 - 4.20 uIU/mL Final   09/15/2022 0.02 (L) 0.30 - 5.00 uIU/mL Final   04/26/2021 <0.01 (L) 0.40 - 4.00 mU/L Final         Current Medications:  Current Outpatient Medications   Medication     adapalene (DIFFERIN) 0.3 % external gel     levothyroxine (SYNTHROID/LEVOTHROID) 175 MCG tablet     methylphenidate (CONCERTA) 36 MG CR tablet     [START ON 5/5/2023] methylphenidate (CONCERTA) 36 MG CR tablet     [START ON 6/5/2023] methylphenidate (CONCERTA) 36 MG CR tablet     methylphenidate (CONCERTA) 36 MG CR tablet     methylphenidate (RITALIN) 5 MG tablet     [START ON 5/5/2023] methylphenidate (RITALIN) 5 MG tablet     [START ON 6/5/2023] methylphenidate (RITALIN) 5 MG tablet     methylphenidate (RITALIN) 5 MG tablet     vitamin D3 (CHOLECALCIFEROL) 50 mcg (2000 units) tablet     clindamycin (CLEOCIN T) 1 % external lotion     No current facility-administered medications for this visit.         The [place] Prescription Monitoring Program has been reviewed and there are no concerns about diversionary activity for controlled substances at this time.      Mental Status Examination:  Keena is a 43-year-old woman in no acute distress.  She is neatly groomed in casual clothing.  Speech is clear and normal in rate and tone.  Eye contact is good over the video connection.  Affect is full.  Mood is euthymic.  Thoughts are  logical and spontaneous with no loose associations or flight of ideas.  Thought content shows no psychosis.  No suicidal thoughts.  She is alert and oriented x3.    Assessment and Plan:    ICD-10-CM    1. ADHD (attention deficit hyperactivity disorder), inattentive type  F90.0 methylphenidate (CONCERTA) 36 MG CR tablet     methylphenidate (CONCERTA) 36 MG CR tablet     methylphenidate (CONCERTA) 36 MG CR tablet     methylphenidate (RITALIN) 5 MG tablet     methylphenidate (RITALIN) 5 MG tablet     methylphenidate (RITALIN) 5 MG tablet          Medical comorbidities include:   Patient Active Problem List   Diagnosis     Varicose veins of legs     CARDIOVASCULAR SCREENING; LDL GOAL LESS THAN 160     Moderate major depression (H)     Graves disease     Compulsive overeating     Palpitations     Acne     Postoperative hypothyroidism     DAYANARA (generalized anxiety disorder)     Tibialis posterior tendonitis, left     Allergic rhinitis     Anxiety     Migraine variant     Tremor     Binge eating disorder     ADHD (attention deficit hyperactivity disorder), inattentive type       Treatment Plan:  Patient Instructions   Continue methylphenidate CR 36 mg daily in the morning and 5 mg immediate release in the afternoon.      Continue all other medications per your primary care provider.    Per our conversation, you are graduating from the Collaborative Care Psychiatry program back to the care of your primary care provider.  Please follow up with them in three to six months.  All future refill requests should go to them.    It has been a pleasure working with you, best wishes for the future!      [facility] Resources:      Go to the Emergency Department as needed or call after hours crisis line at [phone].      To schedule individual or family therapy, call [facility] at [phone].     Follow up with primary care provider as planned or sooner for acute medical concerns.    Call the psychiatric nurse line  "with medication questions or concerns at [phone].    MyChart may be used to communicate with your provider, but this is not intended to be used for emergencies.    Community Resources:      National Suicide Prevention Lifeline: 261.960.4839 (TTY: 486.364.3929). Call anytime for help.  (www.suicidepreventionlifeline.org)    National [place] on Mental Illness (www.purvi.org): 134.779.4746 or 990-055-9075.     Mental Health Association (www.mentalhealth.org): 770.771.5331 or 756-348-7270.    [place] Crisis Text [place]: Text [place] to 559445    Suicide LifeLine Chat: suicideWorksteady.ioline.org/chat    Patient Education   Collaborative Care Psychiatry Service  What to Expect  Here's what to expect from your Collaborative Care Psychiatry Service (CCPS).   About CCPS  CCPS means 2 people work together to help you get better. You'll meet with a behavioral health clinician and a psychiatric doctor. A behavioral health clinician helps people with mental health problems by talking with them. A psychiatric doctor helps people by giving them medicine.  How it works  At every visit, you'll see the behavioral health clinician (BHC) first. They'll talk with you about how you're doing and teach you how to feel better.   Then you'll see the psychiatric doctor. This doctor can help you deal with troubling thoughts and feelings by giving you medicine. They'll make sure you know the plan for your care.   CCPS usually takes 3 to 6 visits. If you need more visits, we may have you start seeing a different psychiatric doctor for ongoing care.  If you have any questions or concerns, we'll be glad to talk with you.  About visits  Be open  At your visits, please talk openly about your problems. It may feel hard, but it's the best way for us to help you.  Cancelling visits  If you can't come to your visit, please call us right away at [phone]. If you don't cancel at least 24 hours (1 full day) before your visit, that's \"late " "cancellation.\"  Being late to visits  Being very late is the same as not showing up. You will be a \"no show\" if:    Your appointment starts with a C, and you're more than 15 minutes late for a 30-minute (half hour) visit. This will also cancel your appointment with the psychiatric doctor.    Your appointment is with a psychiatric doctor only, and you're more than 15 minutes late for a 30-minute (half hour) visit.    Your appointment is with a psychiatric doctor only, and you're more than 30 minutes late for a 60-minute (full hour) visit.  If you cancel late or don't show up 2 times within 6 months, we may end your care.   Getting help between visits  If you need help between visits, you can call us Monday to Friday from 8 a.m. to 4:30 p.m. at [phone].  Emergency care  Call 911 or go to the nearest emergency department if your life or someone else's life is in danger.  Call 708 anytime to reach the [facility] Suicide and Crisis hotline.  Medicine refills  To refill your medicine, call your pharmacy. You can also call [facility]'s Behavioral Access at [phone], Monday to Friday, 8 a.m. to 4:30 p.m. It can take 1 to 3 business days to get a refill.   Forms, letters, and tests  You may have papers to fill out, like FMLA, short-term disability, and workability. We can help you with these forms at your visits, but you must have an appointment. You may need more than 1 visit for this, to be in an intensive therapy program, or both.  Before we can give you medicine for ADHD, we may refer you to get tested for it or confirm it another way.  We may not be able to give you an emotional support animal letter.  We don't do mental health checks ordered by the court.   We don't do mental health testing, but we can refer you to get tested.   Thank you for choosing us for your care.  For informational purposes only. Not to replace the advice of your health care provider. Copyright   2022 [facility]. " All rights reserved. [employer] [id number] - 12/22.         Administrative Billing:   Video call duration: 11 minutes   Start: 9:40 AM   Stop: 9:51 AM  Total time spent, including chart review and documentation: 21 minutes    Patient Status:  The patient is being returned to the referring provider for ongoing care and medication prescribing.  The patient can be referred back to this service for further consultation as needed.

## 2023-04-04 NOTE — Clinical Note
Joss PinaKeena is being returned to your care.  She reports doing well on her current medications, which should be continued over the long term, barring any unforseen circumstances.  Please feel free to contact me with questions or concerns.  Thank you for the opportunity to be involved in the care of this patient.  Regards, Stephany Neumann MD Collaborative Care Psychiatry [facility]

## 2023-04-22 ENCOUNTER — HEALTH MAINTENANCE LETTER (OUTPATIENT)
Age: 43
End: 2023-04-22

## 2023-06-13 ENCOUNTER — TRANSFERRED RECORDS (OUTPATIENT)
Dept: HEALTH INFORMATION MANAGEMENT | Facility: CLINIC | Age: 43
End: 2023-06-13
Payer: COMMERCIAL

## 2023-07-21 ENCOUNTER — MYC MEDICAL ADVICE (OUTPATIENT)
Dept: FAMILY MEDICINE | Facility: CLINIC | Age: 43
End: 2023-07-21
Payer: COMMERCIAL

## 2023-07-21 DIAGNOSIS — F90.9 ATTENTION DEFICIT HYPERACTIVITY DISORDER (ADHD), UNSPECIFIED ADHD TYPE: Primary | ICD-10-CM

## 2023-07-21 RX ORDER — METHYLPHENIDATE HYDROCHLORIDE 36 MG/1
36 TABLET ORAL DAILY
Qty: 30 TABLET | Refills: 0 | Status: SHIPPED | OUTPATIENT
Start: 2023-07-21 | End: 2023-08-20

## 2023-07-21 RX ORDER — METHYLPHENIDATE HYDROCHLORIDE 5 MG/1
5 TABLET ORAL DAILY
Qty: 30 TABLET | Refills: 0 | Status: SHIPPED | OUTPATIENT
Start: 2023-09-21 | End: 2023-10-21

## 2023-07-21 RX ORDER — METHYLPHENIDATE HYDROCHLORIDE 5 MG/1
5 TABLET ORAL DAILY
Qty: 30 TABLET | Refills: 0 | Status: SHIPPED | OUTPATIENT
Start: 2023-08-21 | End: 2023-09-20

## 2023-07-21 RX ORDER — METHYLPHENIDATE HYDROCHLORIDE 5 MG/1
5 TABLET ORAL DAILY
Qty: 30 TABLET | Refills: 0 | Status: SHIPPED | OUTPATIENT
Start: 2023-07-21 | End: 2023-08-20

## 2023-07-21 RX ORDER — METHYLPHENIDATE HYDROCHLORIDE 36 MG/1
36 TABLET ORAL DAILY
Qty: 30 TABLET | Refills: 0 | Status: SHIPPED | OUTPATIENT
Start: 2023-09-21 | End: 2023-10-21

## 2023-07-21 RX ORDER — METHYLPHENIDATE HYDROCHLORIDE 36 MG/1
36 TABLET ORAL DAILY
Qty: 30 TABLET | Refills: 0 | Status: SHIPPED | OUTPATIENT
Start: 2023-08-21 | End: 2023-09-20

## 2023-07-21 NOTE — TELEPHONE ENCOUNTER
Routing to provider    4/4 appointment note: She denies any adverse side effects with her current medications and reports that her current dose seems to be appropriate.  She has to be mindful of timing of the afternoon dose, or it interferes with her ability to sleep.     She is comfortable being return to her primary care provider for ongoing management of her psychotropic medications.  She was informed that I will be leaving [place] as of June 1.     Last seen 9/8/23   Attention deficit  We will test the patient for ADHD.  She is seeing psychiatry so she can follow-up with them for medication management if she is diagnosed with ADHD  - Adult Mental Health  Referral; Future    Willing to prescribe? RN advised follow-up visit.    Arcelia Beatty RN

## 2023-08-23 ENCOUNTER — LAB (OUTPATIENT)
Dept: LAB | Facility: HOSPITAL | Age: 43
End: 2023-08-23
Payer: COMMERCIAL

## 2023-08-23 DIAGNOSIS — E89.0 POSTSURGICAL HYPOTHYROIDISM: Primary | ICD-10-CM

## 2023-08-23 DIAGNOSIS — E05.00 BASEDOW'S DISEASE: ICD-10-CM

## 2023-08-23 LAB
T4 FREE SERPL-MCNC: 1.59 NG/DL (ref 0.9–1.7)
TSH SERPL DL<=0.005 MIU/L-ACNC: 0.78 UIU/ML (ref 0.3–4.2)

## 2023-08-23 PROCEDURE — 84443 ASSAY THYROID STIM HORMONE: CPT

## 2023-08-23 PROCEDURE — 84439 ASSAY OF FREE THYROXINE: CPT

## 2023-08-23 PROCEDURE — 36415 COLL VENOUS BLD VENIPUNCTURE: CPT

## 2023-09-04 NOTE — PATIENT INSTRUCTIONS
"Recommendations from today's MTM visit:                                                    MTM (medication therapy management) is a service provided by a clinical pharmacist designed to help you get the most of out of your medicines.      1. It appears you are due for thyroid labs. It is important to make sure you are not on too much levothyroxine as too much thyroid replacement can contribute to tremor.     2. We discussed that you may try propranolol 10 mg, 1 tablet as needed -- 30-60 minutes before activities that require more tremor control (ie: playing board games)    3. Considerations for medication changes to help with mood and minimize tremor:  -- could restart buspirone and titrate to higher dose than 30 mg/day (low risk of tremor)   -- could add lamotrigine (a mood stabilizer- low risk of tremor)   -- could switch sertraline to a SNRI antidepressant which may have slightly less risk of tremor (ie: venlafaxine or duloxetine)    Follow-up: 3-6 months or sooner if needed    It was great speaking with you today.  I value your experience and would be very thankful for your time in providing feedback in our clinic survey. In the next few days, you may receive an email or text message from [employer] with a link to a survey related to your  clinical pharmacist.\"     To schedule another MTM appointment, please call the clinic directly or you may call the MTM scheduling line at [phone] or toll-free at [phone].     My Clinical Pharmacist's contact information:                                                      Please feel free to contact me with any questions or concerns you have.      Savannah Alexander, Pharm.D.  Medication Therapy Management Pharmacist  [facility]     "
Shortness of Breath    WHAT YOU NEED TO KNOW:    Shortness of breath is a feeling that you cannot get enough air when you breathe in. You may have this feeling only during activity, or all the time. Your symptoms can range from mild to severe. Shortness of breath may be a sign of a serious health condition that needs immediate care.    DISCHARGE INSTRUCTIONS:    Return to the emergency department if:     Your signs and symptoms are the same or worse within 24 hours of treatment.       The skin over your ribs or on your neck sinks in when you breathe.       You feel confused or dizzy.    Contact your healthcare provider if:     You have new or worsening symptoms.      You have questions or concerns about your condition or care.    Medicines:     Medicines may be used to treat the cause of your symptoms. You may need medicine to treat a bacterial infection or reduce anxiety. Other medicines may be used to open your airway, reduce swelling, or remove extra fluid. If you have a heart condition, you may need medicine to help your heart beat more strongly or regularly.      Take your medicine as directed. Contact your healthcare provider if you think your medicine is not helping or if you have side effects. Tell him or her if you are allergic to any medicine. Keep a list of the medicines, vitamins, and herbs you take. Include the amounts, and when and why you take them. Bring the list or the pill bottles to follow-up visits. Carry your medicine list with you in case of an emergency.    Manage shortness of breath:     Create an action plan. You and your healthcare provider can work together to create a plan for how to handle shortness of breath. The plan can include daily activities, treatment changes, and what to do if you have severe breathing problems.      Lean forward on your elbows when you sit. This helps your lungs expand and may make it easier to breathe.      Use pursed-lip breathing any time you feel short of breath. Breathe in through your nose and then slowly breathe out through your mouth with your lips slightly puckered. It should take you twice as long to breathe out as it did to breathe in.Breathe in Breathe out           Do not smoke. Nicotine and other chemicals in cigarettes and cigars can cause lung damage and make shortness of breath worse. Ask your healthcare provider for information if you currently smoke and need help to quit. E-cigarettes or smokeless tobacco still contain nicotine. Talk to your healthcare provider before you use these products.      Reach or maintain a healthy weight. Your healthcare provider can help you create a safe weight loss plan if you are overweight.      Exercise as directed. Exercise can help your lungs work more easily. Exercise can also help you lose weight if needed. Try to get at least 30 minutes of exercise most days of the week.    Follow up with your healthcare provider or specialist as directed: Write down your questions so you remember to ask them during your visits.

## 2023-09-12 ENCOUNTER — ANCILLARY ORDERS (OUTPATIENT)
Dept: FAMILY MEDICINE | Facility: CLINIC | Age: 43
End: 2023-09-12

## 2023-09-12 DIAGNOSIS — Z12.31 VISIT FOR SCREENING MAMMOGRAM: Primary | ICD-10-CM

## 2023-09-15 ASSESSMENT — ENCOUNTER SYMPTOMS
COUGH: 0
MYALGIAS: 1
PARESTHESIAS: 0
CHILLS: 0
NERVOUS/ANXIOUS: 0
SORE THROAT: 0
CONSTIPATION: 0
DIARRHEA: 0
FEVER: 0
WEAKNESS: 0
HEADACHES: 1
FREQUENCY: 0
JOINT SWELLING: 0
NAUSEA: 0
BREAST MASS: 0
DYSURIA: 0
HEMATOCHEZIA: 0
PALPITATIONS: 0
HEARTBURN: 0
SHORTNESS OF BREATH: 0
ABDOMINAL PAIN: 0
ARTHRALGIAS: 1
EYE PAIN: 0
HEMATURIA: 0
DIZZINESS: 0

## 2023-09-15 ASSESSMENT — ANXIETY QUESTIONNAIRES
1. FEELING NERVOUS, ANXIOUS, OR ON EDGE: SEVERAL DAYS
GAD7 TOTAL SCORE: 8
2. NOT BEING ABLE TO STOP OR CONTROL WORRYING: SEVERAL DAYS
7. FEELING AFRAID AS IF SOMETHING AWFUL MIGHT HAPPEN: SEVERAL DAYS
3. WORRYING TOO MUCH ABOUT DIFFERENT THINGS: SEVERAL DAYS
5. BEING SO RESTLESS THAT IT IS HARD TO SIT STILL: SEVERAL DAYS
IF YOU CHECKED OFF ANY PROBLEMS ON THIS QUESTIONNAIRE, HOW DIFFICULT HAVE THESE PROBLEMS MADE IT FOR YOU TO DO YOUR WORK, TAKE CARE OF THINGS AT HOME, OR GET ALONG WITH OTHER PEOPLE: SOMEWHAT DIFFICULT
6. BECOMING EASILY ANNOYED OR IRRITABLE: SEVERAL DAYS
4. TROUBLE RELAXING: MORE THAN HALF THE DAYS
GAD7 TOTAL SCORE: 8

## 2023-09-19 ENCOUNTER — ANCILLARY PROCEDURE (OUTPATIENT)
Dept: MAMMOGRAPHY | Facility: CLINIC | Age: 43
End: 2023-09-19
Attending: FAMILY MEDICINE
Payer: COMMERCIAL

## 2023-09-19 DIAGNOSIS — Z12.31 VISIT FOR SCREENING MAMMOGRAM: ICD-10-CM

## 2023-09-19 PROCEDURE — 77067 SCR MAMMO BI INCL CAD: CPT

## 2023-09-22 ENCOUNTER — MYC MEDICAL ADVICE (OUTPATIENT)
Dept: FAMILY MEDICINE | Facility: CLINIC | Age: 43
End: 2023-09-22

## 2023-09-22 ENCOUNTER — OFFICE VISIT (OUTPATIENT)
Dept: FAMILY MEDICINE | Facility: CLINIC | Age: 43
End: 2023-09-22
Payer: COMMERCIAL

## 2023-09-22 VITALS
SYSTOLIC BLOOD PRESSURE: 100 MMHG | BODY MASS INDEX: 31.37 KG/M2 | RESPIRATION RATE: 16 BRPM | TEMPERATURE: 97.8 F | HEIGHT: 68 IN | DIASTOLIC BLOOD PRESSURE: 60 MMHG | WEIGHT: 207 LBS | OXYGEN SATURATION: 99 % | HEART RATE: 74 BPM

## 2023-09-22 DIAGNOSIS — M18.11 ARTHRITIS OF CARPOMETACARPAL (CMC) JOINT OF RIGHT THUMB: ICD-10-CM

## 2023-09-22 DIAGNOSIS — E89.0 POSTOPERATIVE HYPOTHYROIDISM: ICD-10-CM

## 2023-09-22 DIAGNOSIS — Z13.1 SCREENING FOR DIABETES MELLITUS: ICD-10-CM

## 2023-09-22 DIAGNOSIS — F33.1 MODERATE EPISODE OF RECURRENT MAJOR DEPRESSIVE DISORDER (H): ICD-10-CM

## 2023-09-22 DIAGNOSIS — F90.9 ATTENTION DEFICIT HYPERACTIVITY DISORDER (ADHD), UNSPECIFIED ADHD TYPE: ICD-10-CM

## 2023-09-22 DIAGNOSIS — Z00.00 ROUTINE GENERAL MEDICAL EXAMINATION AT A HEALTH CARE FACILITY: Primary | ICD-10-CM

## 2023-09-22 DIAGNOSIS — G43.809 MIGRAINE VARIANT: ICD-10-CM

## 2023-09-22 DIAGNOSIS — N92.6 IRREGULAR MENSES: ICD-10-CM

## 2023-09-22 DIAGNOSIS — Z13.220 LIPID SCREENING: ICD-10-CM

## 2023-09-22 DIAGNOSIS — E55.9 VITAMIN D DEFICIENCY: ICD-10-CM

## 2023-09-22 LAB
ANION GAP SERPL CALCULATED.3IONS-SCNC: 9 MMOL/L (ref 7–15)
BUN SERPL-MCNC: 11.1 MG/DL (ref 6–20)
CALCIUM SERPL-MCNC: 9.6 MG/DL (ref 8.6–10)
CHLORIDE SERPL-SCNC: 101 MMOL/L (ref 98–107)
CHOLEST SERPL-MCNC: 215 MG/DL
CREAT SERPL-MCNC: 0.71 MG/DL (ref 0.51–0.95)
DEPRECATED HCO3 PLAS-SCNC: 29 MMOL/L (ref 22–29)
EGFRCR SERPLBLD CKD-EPI 2021: >90 ML/MIN/1.73M2
ERYTHROCYTE [DISTWIDTH] IN BLOOD BY AUTOMATED COUNT: 12.6 % (ref 10–15)
FERRITIN SERPL-MCNC: 75 NG/ML (ref 6–175)
GLUCOSE SERPL-MCNC: 97 MG/DL (ref 70–99)
HCT VFR BLD AUTO: 42.9 % (ref 35–47)
HDLC SERPL-MCNC: 75 MG/DL
HGB BLD-MCNC: 14.1 G/DL (ref 11.7–15.7)
LDLC SERPL CALC-MCNC: 126 MG/DL
MAGNESIUM SERPL-MCNC: 2.4 MG/DL (ref 1.7–2.3)
MCH RBC QN AUTO: 29.5 PG (ref 26.5–33)
MCHC RBC AUTO-ENTMCNC: 32.9 G/DL (ref 31.5–36.5)
MCV RBC AUTO: 90 FL (ref 78–100)
NONHDLC SERPL-MCNC: 140 MG/DL
PLATELET # BLD AUTO: 248 10E3/UL (ref 150–450)
POTASSIUM SERPL-SCNC: 4.7 MMOL/L (ref 3.4–5.3)
RBC # BLD AUTO: 4.78 10E6/UL (ref 3.8–5.2)
SODIUM SERPL-SCNC: 139 MMOL/L (ref 136–145)
TRIGL SERPL-MCNC: 69 MG/DL
WBC # BLD AUTO: 4.3 10E3/UL (ref 4–11)

## 2023-09-22 PROCEDURE — 82728 ASSAY OF FERRITIN: CPT | Performed by: FAMILY MEDICINE

## 2023-09-22 PROCEDURE — 99213 OFFICE O/P EST LOW 20 MIN: CPT | Mod: 25 | Performed by: FAMILY MEDICINE

## 2023-09-22 PROCEDURE — 90471 IMMUNIZATION ADMIN: CPT | Performed by: FAMILY MEDICINE

## 2023-09-22 PROCEDURE — 85027 COMPLETE CBC AUTOMATED: CPT | Performed by: FAMILY MEDICINE

## 2023-09-22 PROCEDURE — 82306 VITAMIN D 25 HYDROXY: CPT | Performed by: FAMILY MEDICINE

## 2023-09-22 PROCEDURE — 80061 LIPID PANEL: CPT | Performed by: FAMILY MEDICINE

## 2023-09-22 PROCEDURE — 90686 IIV4 VACC NO PRSV 0.5 ML IM: CPT | Performed by: FAMILY MEDICINE

## 2023-09-22 PROCEDURE — 99396 PREV VISIT EST AGE 40-64: CPT | Mod: 25 | Performed by: FAMILY MEDICINE

## 2023-09-22 PROCEDURE — 36415 COLL VENOUS BLD VENIPUNCTURE: CPT | Performed by: FAMILY MEDICINE

## 2023-09-22 PROCEDURE — 80048 BASIC METABOLIC PNL TOTAL CA: CPT | Performed by: FAMILY MEDICINE

## 2023-09-22 PROCEDURE — 83735 ASSAY OF MAGNESIUM: CPT | Performed by: FAMILY MEDICINE

## 2023-09-22 RX ORDER — METHYLPHENIDATE HYDROCHLORIDE 5 MG/1
5 TABLET ORAL DAILY
Qty: 30 TABLET | Refills: 0 | Status: SHIPPED | OUTPATIENT
Start: 2023-10-23 | End: 2023-11-22

## 2023-09-22 RX ORDER — METHYLPHENIDATE HYDROCHLORIDE 36 MG/1
36 TABLET ORAL DAILY
Qty: 30 TABLET | Refills: 0 | Status: SHIPPED | OUTPATIENT
Start: 2023-12-22 | End: 2024-01-18

## 2023-09-22 RX ORDER — METHYLPHENIDATE HYDROCHLORIDE 5 MG/1
5 TABLET ORAL DAILY
Qty: 30 TABLET | Refills: 0 | Status: SHIPPED | OUTPATIENT
Start: 2023-11-23 | End: 2023-12-23

## 2023-09-22 RX ORDER — METHYLPHENIDATE HYDROCHLORIDE 36 MG/1
36 TABLET ORAL DAILY
Qty: 30 TABLET | Refills: 0 | Status: SHIPPED | OUTPATIENT
Start: 2023-10-23 | End: 2023-11-22

## 2023-09-22 RX ORDER — METHYLPHENIDATE HYDROCHLORIDE 36 MG/1
36 TABLET ORAL DAILY
Qty: 30 TABLET | Refills: 0 | Status: SHIPPED | OUTPATIENT
Start: 2023-11-23 | End: 2023-12-23

## 2023-09-22 RX ORDER — CLINDAMYCIN PHOSPHATE 10 UG/ML
LOTION TOPICAL
COMMUNITY
Start: 2023-06-13

## 2023-09-22 RX ORDER — METHYLPHENIDATE HYDROCHLORIDE 5 MG/1
5 TABLET ORAL DAILY
Qty: 30 TABLET | Refills: 0 | Status: SHIPPED | OUTPATIENT
Start: 2023-12-22 | End: 2024-01-21

## 2023-09-22 RX ORDER — LEVOTHYROXINE SODIUM 150 UG/1
150 TABLET ORAL DAILY
COMMUNITY

## 2023-09-22 RX ORDER — TRETINOIN 0.25 MG/G
CREAM TOPICAL
COMMUNITY
Start: 2023-06-13

## 2023-09-22 ASSESSMENT — ENCOUNTER SYMPTOMS
SORE THROAT: 0
PARESTHESIAS: 0
NERVOUS/ANXIOUS: 0
HEARTBURN: 0
CHILLS: 0
FEVER: 0
ARTHRALGIAS: 1
HEMATOCHEZIA: 0
EYE PAIN: 0
CONSTIPATION: 0
PALPITATIONS: 0
DIZZINESS: 0
DIARRHEA: 0
JOINT SWELLING: 0
SHORTNESS OF BREATH: 0
NAUSEA: 0
FREQUENCY: 0
ABDOMINAL PAIN: 0
DYSURIA: 0
HEMATURIA: 0
BREAST MASS: 0
COUGH: 0
WEAKNESS: 0
MYALGIAS: 1
HEADACHES: 1

## 2023-09-22 ASSESSMENT — PATIENT HEALTH QUESTIONNAIRE - PHQ9
SUM OF ALL RESPONSES TO PHQ QUESTIONS 1-9: 8
SUM OF ALL RESPONSES TO PHQ QUESTIONS 1-9: 8
10. IF YOU CHECKED OFF ANY PROBLEMS, HOW DIFFICULT HAVE THESE PROBLEMS MADE IT FOR YOU TO DO YOUR WORK, TAKE CARE OF THINGS AT HOME, OR GET ALONG WITH OTHER PEOPLE: SOMEWHAT DIFFICULT

## 2023-09-22 ASSESSMENT — PAIN SCALES - GENERAL: PAINLEVEL: NO PAIN (0)

## 2023-09-22 NOTE — PROGRESS NOTES
SUBJECTIVE:   CC: Keena is an 43 year old who presents for preventive health visit.       9/22/2023     6:58 AM   Additional Questions   Roomed by Delmy         9/22/2023     6:58 AM   Patient Reported Additional Medications   Patient reports taking the following new medications None       Healthy Habits:     Getting at least 3 servings of Calcium per day:  Yes    Bi-annual eye exam:  NO    Dental care twice a year:  NO    Sleep apnea or symptoms of sleep apnea:  None    Diet:  Other    Frequency of exercise:  4-5 days/week    Duration of exercise:  15-30 minutes    Taking medications regularly:  Yes    Medication side effects:  None    Additional concerns today:  Yes    -- Increased migraines  --Fasting for labs      Today's PHQ-9 Score:       9/22/2023     6:57 AM   PHQ-9 SCORE   PHQ-9 Total Score MyChart 8 (Mild depression)   PHQ-9 Total Score 8       Medication Followup of Concerta & Ritalin  Taking Medication as prescribed: yes  Side Effects:  None  Medication Helping Symptoms:  yes  She was recently diagnosed with ADHD.  She had been seeing psych who retired.    She is doing great on concerta ER 36 mg and ritatlin 5 mg daily for both    She has no additional depression symptoms.      Depression and Anxiety Follow-Up  How are you doing with your depression since your last visit? Improved   How are you doing with your anxiety since your last visit?  Improved   Are you having other symptoms that might be associated with depression or anxiety? No  Have you had a significant life event? No   Do you have any concerns with your use of alcohol or other drugs? No    Social History     Tobacco Use    Smoking status: Former     Packs/day: 0.00     Years: 0.00     Pack years: 0.00     Types: Cigarettes     Passive exposure: Never    Smokeless tobacco: Never    Tobacco comments:     smoked from ages [age]   Vaping Use    Vaping Use: Never used   Substance Use Topics    Alcohol use: No     Comment: none due to being  overeaters annonymous    Drug use: No         1/9/2023    10:59 AM 4/4/2023     9:35 AM 9/22/2023     6:57 AM   PHQ   PHQ-9 Total Score 6 8 8   Q9: Thoughts of better off dead/self-harm past 2 weeks Not at all Not at all Not at all         1/9/2023    11:00 AM 3/28/2023     3:08 PM 9/15/2023     9:55 AM   DAYANARA-7 SCORE   Total Score 7 (mild anxiety) 8 (mild anxiety) 8 (mild anxiety)   Total Score 7 8 8         9/22/2023     6:57 AM   Last PHQ-9   1.  Little interest or pleasure in doing things 1   2.  Feeling down, depressed, or hopeless 1   3.  Trouble falling or staying asleep, or sleeping too much 1   4.  Feeling tired or having little energy 1   5.  Poor appetite or overeating 1   6.  Feeling bad about yourself 1   7.  Trouble concentrating 2   8.  Moving slowly or restless 0   Q9: Thoughts of better off dead/self-harm past 2 weeks 0   PHQ-9 Total Score 8         9/15/2023     9:55 AM   DAYANARA-7    1. Feeling nervous, anxious, or on edge 1   2. Not being able to stop or control worrying 1   3. Worrying too much about different things 1   4. Trouble relaxing 2   5. Being so restless that it is hard to sit still 1   6. Becoming easily annoyed or irritable 1   7. Feeling afraid, as if something awful might happen 1   DAYANARA-7 Total Score 8   If you checked any problems, how difficult have they made it for you to do your work, take care of things at home, or get along with other people? Somewhat difficult       Suicide Assessment Five-step Evaluation and Treatment (SAFE-T)          Hypothyroidism Follow-up    Since last visit, patient describes the following symptoms: Weight stable, no hair loss, no skin changes, no constipation, no loose stools  TSH   Date Value Ref Range Status   08/23/2023 0.78 0.30 - 4.20 uIU/mL Final   09/15/2022 0.02 (L) 0.30 - 5.00 uIU/mL Final   04/26/2021 <0.01 (L) 0.40 - 4.00 mU/L Final     She sees endocrine   \    She is having more frequent migraines.  It used to be a few times a year and is  not monthly.  She takes motrin which works.   Her menses are changing.  She has very heavy menses at times. She is not sure if the headaches are associate with her menses.      She has pain at the base of her right thumb.  It is flaring up lasting for a week or so.  It seems weak at times.  It responds well to tyelnol.  It doesn't seem to be improved with heat or ice.  It started a bout a year ago.  She doesn't know the trigger.      She does computer work all day.  She uses a mouse to help with this.  It doesn't seem to be the computer work.  She does a lot of sewing and painting.        Social History     Tobacco Use    Smoking status: Former     Packs/day: 0.00     Years: 0.00     Pack years: 0.00     Types: Cigarettes     Passive exposure: Never    Smokeless tobacco: Never    Tobacco comments:     smoked from ages [age]   Substance Use Topics    Alcohol use: No     Comment: none due to being overeaters annonymous             9/15/2023     9:57 AM   Alcohol Use   Prescreen: >3 drinks/day or >7 drinks/week? No          No data to display              Reviewed orders with patient.  Reviewed health maintenance and updated orders accordingly - Yes  BP Readings from Last 3 Encounters:   09/22/23 100/60   08/14/22 96/67   05/13/22 111/70    Wt Readings from Last 3 Encounters:   09/22/23 93.9 kg (207 lb)   07/06/22 92.1 kg (203 lb)   05/13/22 95.3 kg (210 lb)                    Breast Cancer Screening:    FHS-7:       2/23/2022     8:29 AM 9/15/2022     2:52 PM 9/15/2023     9:58 AM 9/19/2023     2:44 PM   Breast CA Risk Assessment (FHS-7)   Did any of your first-degree relatives have breast or ovarian cancer? Yes Yes Yes Yes   Did any of your relatives have bilateral breast cancer? No  No No   Did any man in your family have breast cancer? No  No No   Did any woman in your family have breast and ovarian cancer? Yes  Yes No   Did any woman in your family have breast cancer before age 50 y? No  No No   Do you have 2 or  more relatives with breast and/or ovarian cancer? Yes  No Yes   Do you have 2 or more relatives with breast and/or bowel cancer? Yes  No No       Mammogram Screening - Offered annual screening and updated Health Maintenance for [place] plan based on risk factor consideration  Pertinent mammograms are reviewed under the imaging tab.    History of abnormal Pap smear: NO - age 30-65 PAP every 5 years with negative HPV co-testing recommended      Latest Ref Rng & Units 2/23/2022     9:23 AM 9/2/2016    10:36 AM 9/2/2016    12:00 AM   PAP / HPV   PAP  Negative for Intraepithelial Lesion or Malignancy (NILM)      PAP (Historical)    NIL    HPV 16 DNA Negative Negative  Negative     HPV 18 DNA Negative Negative  Negative     Other HR HPV Negative Negative  Negative       Reviewed and updated as needed this visit by clinical staff   Tobacco  Allergies  Meds              Reviewed and updated as needed this visit by Provider                     Review of Systems   Constitutional:  Negative for chills and fever.   HENT:  Negative for congestion, ear pain, hearing loss and sore throat.    Eyes:  Negative for pain and visual disturbance.   Respiratory:  Negative for cough and shortness of breath.    Cardiovascular:  Negative for chest pain, palpitations and peripheral edema.   Gastrointestinal:  Negative for abdominal pain, constipation, diarrhea, heartburn, hematochezia and nausea.   Breasts:  Negative for tenderness, breast mass and discharge.   Genitourinary:  Negative for dysuria, frequency, genital sores, hematuria, pelvic pain, urgency, vaginal bleeding and vaginal discharge.   Musculoskeletal:  Positive for arthralgias and myalgias. Negative for joint swelling.   Skin:  Negative for rash.   Neurological:  Positive for headaches. Negative for dizziness, weakness and paresthesias.   Psychiatric/Behavioral:  Negative for mood changes. The patient is not nervous/anxious.         OBJECTIVE:   /60 (BP Location: Right  "arm, Patient Position: Sitting, Cuff Size: Adult Large)   Pulse 74   Temp 97.8  F (36.6  C) (Oral)   Resp 16   Ht 1.715 m (5' 7.5\")   Wt 93.9 kg (207 lb)   LMP 09/11/2023   SpO2 99%   BMI 31.94 kg/m    Physical Exam  GENERAL: healthy, alert and no distress  EYES: Eyes grossly normal to inspection, PERRL and conjunctivae and sclerae normal  HENT: ear canals and TM's normal, nose and mouth without ulcers or lesions  NECK: no adenopathy, no asymmetry, masses, or scars and thyroid normal to palpation  RESP: lungs clear to auscultation - no rales, rhonchi or wheezes  BREAST: normal without masses, tenderness or nipple discharge and no palpable axillary masses or adenopathy  CV: regular rate and rhythm, normal S1 S2, no S3 or S4, no murmur, click or rub, no peripheral edema and peripheral pulses strong  ABDOMEN: soft, nontender, no hepatosplenomegaly, no masses and bowel sounds normal  MS: tenderness to palpation first CMC joint on the right  SKIN: no suspicious lesions or rashes  NEURO: Normal strength and tone, mentation intact and speech normal  PSYCH: mentation appears normal, affect normal/bright        ASSESSMENT/PLAN:   (Z00.00) Routine general medical examination at a health care facility  (primary encounter diagnosis)  Comment:   Plan:     (M18.11) Arthritis of carpometacarpal (CMC) joint of right thumb  Comment: Tenderness over the CMC joint and pain with using it correlate with arthritis.  I recommended hand therapy.  If she does not see improvement she could see Ortho for steroid injection.  We also discussed the surgical options  Plan: Occupational Therapy Referral, OFFICE/OUTPT         VISIT,LAQUITA DHALIWAL IV            (F90.9) Attention deficit hyperactivity disorder (ADHD), unspecified ADHD type  Comment: The current medical regimen is effective;  continue present plan and medications.  I am taking over the patient's stimulant prescriptions because her psychiatrist has retired.  She is doing well without " side effects  Plan: methylphenidate HCl ER, OSM, (CONCERTA) 36 MG         CR tablet, methylphenidate HCl ER, OSM,         (CONCERTA) 36 MG CR tablet, methylphenidate HCl        ER, OSM, (CONCERTA) 36 MG CR tablet,         methylphenidate (RITALIN) 5 MG tablet,         methylphenidate (RITALIN) 5 MG tablet,         methylphenidate (RITALIN) 5 MG tablet,         OFFICE/OUTPT VISIT,EST,LEVL IV            (E89.0) Postoperative hypothyroidism  Comment: Managed by endocrinology.  Plan: Magnesium, OFFICE/OUTPT VISIT,EST,LEVL IV            (G43.809) Migraine variant  Comment: We discussed common migraine triggers.  She is taking magnesium daily.  This could help the migraines however she is taking magnesium citrate instead of blocks a day.  Magnesium oxidated was shown to have better help suppress migraines.  We also discussed coq.10 enzyme as an option.  Plan: OFFICE/OUTPT VISIT,EST,LEVL IV            (F33.1) Moderate episode of recurrent major depressive disorder (H)  Comment: The patient's depression was associated with untreated ADHD.  She does not have depressive symptoms currently  Plan: OFFICE/OUTPT VISIT,EST,LEVL IV            (N92.6) Irregular menses  Comment: We will monitor with labs today to determine if this is causing anemia  Plan: CBC with platelets, Ferritin, OFFICE/OUTPT         VISIT,EST,LEVL IV            (E55.9) Vitamin D deficiency  Comment:   Plan: Vitamin D Deficiency, OFFICE/OUTPT         VISIT,EST,LEVL IV            (Z13.220) Lipid screening  Comment:   Plan: Lipid panel reflex to direct LDL Fasting            (Z13.1) Screening for diabetes mellitus  Comment:   Plan: Basic metabolic panel  (Ca, Cl, CO2, Creat,         Gluc, K, Na, BUN)              COUNSELING:  Reviewed preventive health counseling, as reflected in patient instructions       Regular exercise       Healthy diet/nutrition        She reports that she has quit smoking. Her smoking use included cigarettes. She has never been exposed to  tobacco smoke. She has never used smokeless tobacco.          Silvana Patton DO  [facility]

## 2023-09-24 LAB — DEPRECATED CALCIDIOL+CALCIFEROL SERPL-MC: 39 UG/L (ref 20–75)

## 2023-09-28 ENCOUNTER — MYC MEDICAL ADVICE (OUTPATIENT)
Dept: FAMILY MEDICINE | Facility: CLINIC | Age: 43
End: 2023-09-28
Payer: COMMERCIAL

## 2023-09-28 DIAGNOSIS — I83.93 VARICOSE VEINS OF BOTH LOWER EXTREMITIES, UNSPECIFIED WHETHER COMPLICATED: Primary | ICD-10-CM

## 2023-10-03 ENCOUNTER — THERAPY VISIT (OUTPATIENT)
Dept: OCCUPATIONAL THERAPY | Facility: CLINIC | Age: 43
End: 2023-10-03
Attending: FAMILY MEDICINE
Payer: COMMERCIAL

## 2023-10-03 DIAGNOSIS — M18.11 ARTHRITIS OF CARPOMETACARPAL (CMC) JOINT OF RIGHT THUMB: ICD-10-CM

## 2023-10-03 PROCEDURE — 97166 OT EVAL MOD COMPLEX 45 MIN: CPT | Mod: GO | Performed by: OCCUPATIONAL THERAPIST

## 2023-10-03 PROCEDURE — 97110 THERAPEUTIC EXERCISES: CPT | Mod: GO | Performed by: OCCUPATIONAL THERAPIST

## 2023-10-03 PROCEDURE — 97530 THERAPEUTIC ACTIVITIES: CPT | Mod: GO | Performed by: OCCUPATIONAL THERAPIST

## 2023-10-03 NOTE — PROGRESS NOTES
OCCUPATIONAL THERAPY EVALUATION  Type of Visit: Evaluation    See electronic medical record for Abuse and Falls Screening details.    Subjective      Presenting condition or subjective complaint:    Date of onset: 10/03/22    Relevant medical history: -- (Bilateral Tremor) Graves disease , hypermobility, autoimmune disease in family history   Dates & types of surgery:  None     Prior diagnostic imaging/testing results:     None manual exam   Prior therapy history for the same diagnosis, illness or injury: No      Prior Level of Function  Transfers: Independent  Ambulation: Independent  ADL: Independent  IADL: Childcare, Driving, Finances, Housekeeping, Laundry, Meal preparation, Work    Living Environment  Social support: With family members   Type of home:   3 children twin 12 and 8   Stairs to enter the home:         Ramp:     Stairs inside the home:         Help at home: None  Equipment owned:       Employment:    Library, computer work, has taught in [place]   Hobbies/Interests:  Knit sew paint mix media     Patient goals for therapy: I want to better understand how to  manage my thumbs so they do not give  me issues in the future    Pain assessment: Pain present  Location: base pf cmc /Rating: ache .5/10     Objective   ADDITIONAL HISTORY:  Ambidextrous  Patient reports symptoms of pain and weakness/loss of strength  Transportation: drives  Currently working in normal job without restrictions    Functional Outcome Measure:   29    PAIN: Pt reports minimal pain today, but during flare pain significantly increases.     POSTURE: Forward Neck Posture, Rounded Forward Shoulders, and Shoulder Height Difference     EDEMA: Occasional Mild Right    SENSATION: WNL throughout all nerve distributions; per patient report. Numb over cmc    ROM:   Thumb ROM  Left AROM Right AROM    MP Joint 0/75, - (not present) 0/70   IP Joint  0/70  hyper extension +80 0/75  Hyper extension +75   Radial Abduction 35 40   Palmar Abduction 60 50  "  Opposition WNL WNL   Retropulsion (cm) Unable hyperextends MP IP cmc unable to lay flat \"    \"   Kapandji Opposition Scale (0-10/10) WNL WNL       OBSERVATIONS/APPEARANCE:   Thumb Left Right   Shoulder deformity present at CMC joint + (mild) ++ (mod)   Edema over CMC joint - (not present) + (mild)   Noted collapse of MP Joint into hyperextension during pinch +++ (severe), + (hypermobile) ++ (mod), + (hypermobile)        SPECIAL TESTS:   CMC OA Provocative Tests  Pain Report Left Right   AP Joint Laxity of Trapezium + (mild) + (mild)   Crepitus Present - (not present) - (not present)   Thumb Adduction Stress Test + (mild) + (mild)   C Thumb Extension Stress Test - (not present) - (not present)   Finkelstein's Test - (not present) - (not present)     STRENGTH:     Measured in pounds 10/3/2023 10/3/2023    Left Right   Trial 1 65 63     Lateral Pinch  Measured in pounds 10/3/2023 10/3/2023    Left Right   Trial 1 10 12     3 Point Pinch  Measured in pounds 10/3/2023 10/3/2023    Left Right   Trial 1 14 18       Beighton Hypermobility Scale 10/3/2023   B elbow (2) 2   B knees (2) -   B thumbs (2)  -   B small finger HE (2) 2   Bend and touch floor with flat palms (1) 1   Score: (0-9) 5          Assessment & Plan   CLINICAL IMPRESSIONS  Medical Diagnosis: Right Tumb CMC    Treatment Diagnosis: Right Thumb CMC    Impression/Assessment: Pt is a 43 year old female presenting to Occupational Therapy due to Thumb cmc instability.  The following significant findings have been identified: Impaired ROM, Impaired strength, and Pain.  These identified deficits interfere with their ability to perform self care tasks, work tasks, recreational activities, and household chores as compared to previous level of function.   Patient's limitations or Problem List includes: Pain, Decreased ROM/motion, Weakness, Decreased stability, Hypermobility, Decreased , Decreased pinch, and Decreased dexterity of the bilateral thumb which " interferes with the patient's ability to perform Self Care Tasks (dressing), Work Tasks, Sleep Patterns, Recreational Activities, and Household Chores as compared to previous level of function.    Clinical Decision Making (Complexity):  Assessment of Occupational Performance: 3-5 Performance Deficits  Occupational Performance Limitations: dressing, hygiene and grooming, home establishment and management, meal preparation and cleanup, and shopping  Clinical Decision Making (Complexity): Moderate complexity    PLAN OF CARE  Treatment Interventions:  Modalities:  heat  Therapeutic Exercise:  AROM, AAROM, PROM, Isotonics, Isometrics, and Stabilization  Neuromuscular re-education:  Coordination/Dexterity, Kinesthetic Training, Proprioceptive Training, Kinesiotaping, Isometrics, and Stabilization  Manual Techniques:  Joint mobilization  Orthotic Fabrication:  Prefabricated recommendations comfort cool, cmc push   Self Care:  Self Care Tasks and Ergonomic Considerations    Long Term Goals   OT Goal 1  Goal Identifier: Thumb stability  Goal Description: Pt will be able to do buttons without hypermobility  Rationale: In order to maximize safety and independence with performance of self-care activities  Target Date: 11/09/23      Frequency of Treatment: 1x  Duration of Treatment: 6 weeks     Education Assessment: Learner/Method: Patient  Education Comments: Very attentive, add detail     Risks and benefits of evaluation/treatment have been explained.   Patient/Family/caregiver agrees with Plan of Care.     Evaluation Time:    OT Eval, Moderate Complexity Minutes (31570): 30      Signing Clinician: Dayan Alcaraz OT

## 2023-10-11 ENCOUNTER — THERAPY VISIT (OUTPATIENT)
Dept: OCCUPATIONAL THERAPY | Facility: CLINIC | Age: 43
End: 2023-10-11
Payer: COMMERCIAL

## 2023-10-11 DIAGNOSIS — M18.11 ARTHRITIS OF CARPOMETACARPAL (CMC) JOINT OF RIGHT THUMB: Primary | ICD-10-CM

## 2023-10-11 DIAGNOSIS — M79.644 CHRONIC PAIN OF RIGHT THUMB: ICD-10-CM

## 2023-10-11 DIAGNOSIS — G89.29 CHRONIC PAIN OF RIGHT THUMB: ICD-10-CM

## 2023-10-11 PROCEDURE — 97110 THERAPEUTIC EXERCISES: CPT | Mod: GO | Performed by: OCCUPATIONAL THERAPIST

## 2023-10-19 ENCOUNTER — THERAPY VISIT (OUTPATIENT)
Dept: OCCUPATIONAL THERAPY | Facility: CLINIC | Age: 43
End: 2023-10-19
Payer: COMMERCIAL

## 2023-10-19 DIAGNOSIS — M79.644 CHRONIC PAIN OF RIGHT THUMB: Primary | ICD-10-CM

## 2023-10-19 DIAGNOSIS — G89.29 CHRONIC PAIN OF RIGHT THUMB: Primary | ICD-10-CM

## 2023-10-19 PROCEDURE — 97110 THERAPEUTIC EXERCISES: CPT | Mod: GO | Performed by: OCCUPATIONAL THERAPIST

## 2023-12-21 ENCOUNTER — TELEPHONE (OUTPATIENT)
Dept: FAMILY MEDICINE | Facility: CLINIC | Age: 43
End: 2023-12-21
Payer: COMMERCIAL

## 2023-12-21 NOTE — TELEPHONE ENCOUNTER
Received call from Papito, Pharmacist with [facility].     States that patient's Concerta was post-dated for 12/22, patient last picked up med 11/20.    Would like to fill today, routing to provider to provide the okay.    DELROY SantizoN RN  [facility], [facility]

## 2024-01-17 ENCOUNTER — MYC MEDICAL ADVICE (OUTPATIENT)
Dept: FAMILY MEDICINE | Facility: CLINIC | Age: 44
End: 2024-01-17
Payer: COMMERCIAL

## 2024-01-17 NOTE — TELEPHONE ENCOUNTER
RN replied to patient via [employer]. See message for details.     Bronson Salamanca RN, BSN, PHN  [facility] [place]

## 2024-01-18 DIAGNOSIS — F90.9 ATTENTION DEFICIT HYPERACTIVITY DISORDER (ADHD), UNSPECIFIED ADHD TYPE: ICD-10-CM

## 2024-01-18 RX ORDER — METHYLPHENIDATE HYDROCHLORIDE 36 MG/1
36 TABLET, EXTENDED RELEASE ORAL DAILY
Qty: 30 TABLET | Refills: 0 | Status: SHIPPED | OUTPATIENT
Start: 2024-01-18 | End: 2024-02-19

## 2024-01-31 ENCOUNTER — MYC MEDICAL ADVICE (OUTPATIENT)
Dept: FAMILY MEDICINE | Facility: CLINIC | Age: 44
End: 2024-01-31
Payer: COMMERCIAL

## 2024-01-31 NOTE — LETTER
February 1, 2024      Keena Tobias  [address]  [place] [facility] [address]        To Whom It May Concern:    Keena Tobias is having musculoskeletal problems and needs an ergonomic keyboard and mouse permanently.  Let me know if you have any questions.      Sincerely,          Silvana Patton, DO

## 2024-02-07 ENCOUNTER — LAB (OUTPATIENT)
Dept: LAB | Facility: HOSPITAL | Age: 44
End: 2024-02-07
Payer: COMMERCIAL

## 2024-02-07 DIAGNOSIS — E89.0 POSTSURGICAL HYPOTHYROIDISM: Primary | ICD-10-CM

## 2024-02-07 DIAGNOSIS — E05.00 BASEDOW'S DISEASE: ICD-10-CM

## 2024-02-07 LAB
T4 FREE SERPL-MCNC: 1.48 NG/DL (ref 0.9–1.7)
TSH SERPL DL<=0.005 MIU/L-ACNC: 1.05 UIU/ML (ref 0.3–4.2)

## 2024-02-07 PROCEDURE — 84439 ASSAY OF FREE THYROXINE: CPT

## 2024-02-07 PROCEDURE — 84443 ASSAY THYROID STIM HORMONE: CPT

## 2024-02-07 PROCEDURE — 36415 COLL VENOUS BLD VENIPUNCTURE: CPT

## 2024-02-19 ENCOUNTER — MYC REFILL (OUTPATIENT)
Dept: FAMILY MEDICINE | Facility: CLINIC | Age: 44
End: 2024-02-19
Payer: COMMERCIAL

## 2024-02-19 DIAGNOSIS — F90.9 ATTENTION DEFICIT HYPERACTIVITY DISORDER (ADHD), UNSPECIFIED ADHD TYPE: ICD-10-CM

## 2024-02-19 RX ORDER — METHYLPHENIDATE HYDROCHLORIDE 36 MG/1
36 TABLET ORAL DAILY
Qty: 30 TABLET | Refills: 0 | Status: SHIPPED | OUTPATIENT
Start: 2024-02-19 | End: 2024-09-19

## 2024-02-19 RX ORDER — METHYLPHENIDATE HYDROCHLORIDE 36 MG/1
36 TABLET ORAL DAILY
Qty: 30 TABLET | Refills: 0 | OUTPATIENT
Start: 2024-02-19

## 2024-02-27 ENCOUNTER — VIRTUAL VISIT (OUTPATIENT)
Dept: FAMILY MEDICINE | Facility: CLINIC | Age: 44
End: 2024-02-27
Payer: COMMERCIAL

## 2024-02-27 DIAGNOSIS — F90.0 ADHD (ATTENTION DEFICIT HYPERACTIVITY DISORDER), INATTENTIVE TYPE: ICD-10-CM

## 2024-02-27 DIAGNOSIS — M18.11 ARTHRITIS OF CARPOMETACARPAL (CMC) JOINT OF RIGHT THUMB: Primary | ICD-10-CM

## 2024-02-27 PROCEDURE — 99214 OFFICE O/P EST MOD 30 MIN: CPT | Mod: 95 | Performed by: FAMILY MEDICINE

## 2024-02-27 RX ORDER — METHYLPHENIDATE HYDROCHLORIDE 5 MG/1
5 TABLET ORAL DAILY
Qty: 30 TABLET | Refills: 0 | Status: SHIPPED | OUTPATIENT
Start: 2024-03-18 | End: 2024-04-17

## 2024-02-27 RX ORDER — METHYLPHENIDATE HYDROCHLORIDE 5 MG/1
5 TABLET ORAL DAILY
Qty: 30 TABLET | Refills: 0 | Status: SHIPPED | OUTPATIENT
Start: 2024-04-18 | End: 2024-05-18

## 2024-02-27 RX ORDER — METHYLPHENIDATE HYDROCHLORIDE 36 MG/1
36 TABLET ORAL DAILY
Qty: 30 TABLET | Refills: 0 | Status: SHIPPED | OUTPATIENT
Start: 2024-04-18 | End: 2024-05-18

## 2024-02-27 RX ORDER — METHYLPHENIDATE HYDROCHLORIDE 36 MG/1
36 TABLET ORAL DAILY
Qty: 30 TABLET | Refills: 0 | Status: SHIPPED | OUTPATIENT
Start: 2024-05-17 | End: 2024-06-16

## 2024-02-27 RX ORDER — METHYLPHENIDATE HYDROCHLORIDE 5 MG/1
5 TABLET ORAL DAILY
Qty: 30 TABLET | Refills: 0 | Status: SHIPPED | OUTPATIENT
Start: 2024-05-17 | End: 2024-06-16

## 2024-02-27 RX ORDER — METHYLPHENIDATE HYDROCHLORIDE 36 MG/1
36 TABLET ORAL DAILY
Qty: 30 TABLET | Refills: 0 | Status: SHIPPED | OUTPATIENT
Start: 2024-03-18 | End: 2024-04-17

## 2024-02-27 ASSESSMENT — ANXIETY QUESTIONNAIRES
GAD7 TOTAL SCORE: 11
7. FEELING AFRAID AS IF SOMETHING AWFUL MIGHT HAPPEN: NOT AT ALL
4. TROUBLE RELAXING: NEARLY EVERY DAY
3. WORRYING TOO MUCH ABOUT DIFFERENT THINGS: SEVERAL DAYS
2. NOT BEING ABLE TO STOP OR CONTROL WORRYING: MORE THAN HALF THE DAYS
1. FEELING NERVOUS, ANXIOUS, OR ON EDGE: SEVERAL DAYS
8. IF YOU CHECKED OFF ANY PROBLEMS, HOW DIFFICULT HAVE THESE MADE IT FOR YOU TO DO YOUR WORK, TAKE CARE OF THINGS AT HOME, OR GET ALONG WITH OTHER PEOPLE?: SOMEWHAT DIFFICULT
5. BEING SO RESTLESS THAT IT IS HARD TO SIT STILL: MORE THAN HALF THE DAYS
GAD7 TOTAL SCORE: 11
6. BECOMING EASILY ANNOYED OR IRRITABLE: MORE THAN HALF THE DAYS
GAD7 TOTAL SCORE: 11
7. FEELING AFRAID AS IF SOMETHING AWFUL MIGHT HAPPEN: NOT AT ALL
IF YOU CHECKED OFF ANY PROBLEMS ON THIS QUESTIONNAIRE, HOW DIFFICULT HAVE THESE PROBLEMS MADE IT FOR YOU TO DO YOUR WORK, TAKE CARE OF THINGS AT HOME, OR GET ALONG WITH OTHER PEOPLE: SOMEWHAT DIFFICULT

## 2024-02-27 ASSESSMENT — PATIENT HEALTH QUESTIONNAIRE - PHQ9
SUM OF ALL RESPONSES TO PHQ QUESTIONS 1-9: 7
SUM OF ALL RESPONSES TO PHQ QUESTIONS 1-9: 7
10. IF YOU CHECKED OFF ANY PROBLEMS, HOW DIFFICULT HAVE THESE PROBLEMS MADE IT FOR YOU TO DO YOUR WORK, TAKE CARE OF THINGS AT HOME, OR GET ALONG WITH OTHER PEOPLE: SOMEWHAT DIFFICULT

## 2024-02-27 NOTE — PROGRESS NOTES
"Keena is a 44 year old who is being evaluated via a billable video visit.      How would you like to obtain your AVS? MyChart  If the video visit is dropped, the invitation should be resent by: Text to cell phone: [phone]  Will anyone else be joining your video visit? No          Assessment & Plan     Arthritis of carpometacarpal (CMC) joint of right thumb    Refer to ortho for possible injection    - XR Wrist Right G/E 3 Views; Future  - Orthopedic  Referral; Future    ADHD (attention deficit hyperactivity disorder), inattentive type  The current medical regimen is effective;  continue present plan and medications.    - methylphenidate HCl ER, OSM, (CONCERTA) 36 MG CR tablet; Take 1 tablet (36 mg) by mouth daily for 30 days  - methylphenidate HCl ER, OSM, (CONCERTA) 36 MG CR tablet; Take 1 tablet (36 mg) by mouth daily for 30 days  - methylphenidate HCl ER, OSM, (CONCERTA) 36 MG CR tablet; Take 1 tablet (36 mg) by mouth daily for 30 days  - methylphenidate (RITALIN) 5 MG tablet; Take 1 tablet (5 mg) by mouth daily for 30 days  - methylphenidate (RITALIN) 5 MG tablet; Take 1 tablet (5 mg) by mouth daily for 30 days  - methylphenidate (RITALIN) 5 MG tablet; Take 1 tablet (5 mg) by mouth daily for 30 days      30 minutes spent by me on the date of the encounter doing chart review, history and exam, documentation and further activities per the note      BMI  Estimated body mass index is 31.94 kg/m  as calculated from the following:    Height as of 9/22/23: 1.715 m (5' 7.5\").    Weight as of 9/22/23: 93.9 kg (207 lb).         Follow up in person for CPE    Subjective   Keena is a 44 year old, presenting for the following health issues:  Arthritis (Follow up right hand pain )        2/27/2024     1:45 PM   Additional Questions   Roomed by Delmy LAMAS   Accompanied by self         2/27/2024     1:45 PM   Patient Reported Additional Medications   Patient reports taking the following new medications none "     Arthritis    History of Present Illness       Reason for visit:  Hand pain    She eats 4 or more servings of fruits and vegetables daily.She consumes 0 sweetened beverage(s) daily.She exercises with enough effort to increase her heart rate 9 or less minutes per day.  She exercises with enough effort to increase her heart rate 3 or less days per week.   She is taking medications regularly.     -- Follow up from last visit Right hand pain/arthritis, has been seeing hand therapy which has helped somewhat          Review of Systems  Constitutional, HEENT, cardiovascular, pulmonary, gi and gu systems are negative, except as otherwise noted.      Objective           Vitals:  No vitals were obtained today due to virtual visit.    Physical Exam   GENERAL: alert and no distress  EYES: Eyes grossly normal to inspection.  No discharge or erythema, or obvious scleral/conjunctival abnormalities.  RESP: No audible wheeze, cough, or visible cyanosis.    SKIN: Visible skin clear. No significant rash, abnormal pigmentation or lesions.  NEURO: Cranial nerves grossly intact.  Mentation and speech appropriate for age.  PSYCH: Appropriate affect, tone, and pace of words          Video-Visit Details    Type of service:  Video Visit     Originating Location (pt. Location): Home    Distant Location (provider location):  Off-site  Platform used for Video Visit: Garcia  Signed Electronically by: Silvana Patton DO

## 2024-02-29 ENCOUNTER — HOSPITAL ENCOUNTER (OUTPATIENT)
Dept: GENERAL RADIOLOGY | Facility: HOSPITAL | Age: 44
Discharge: HOME | End: 2024-02-29
Attending: FAMILY MEDICINE | Admitting: FAMILY MEDICINE
Payer: COMMERCIAL

## 2024-02-29 DIAGNOSIS — M18.11 ARTHRITIS OF CARPOMETACARPAL (CMC) JOINT OF RIGHT THUMB: ICD-10-CM

## 2024-02-29 PROCEDURE — 73110 X-RAY EXAM OF WRIST: CPT | Mod: RT

## 2024-03-13 ENCOUNTER — OFFICE VISIT (OUTPATIENT)
Dept: ORTHOPEDICS | Facility: CLINIC | Age: 44
End: 2024-03-13
Attending: FAMILY MEDICINE
Payer: COMMERCIAL

## 2024-03-13 DIAGNOSIS — G89.29 CHRONIC PAIN OF RIGHT THUMB: Primary | ICD-10-CM

## 2024-03-13 DIAGNOSIS — M79.644 CHRONIC PAIN OF RIGHT THUMB: Primary | ICD-10-CM

## 2024-03-13 DIAGNOSIS — M18.11 ARTHRITIS OF CARPOMETACARPAL (CMC) JOINT OF RIGHT THUMB: ICD-10-CM

## 2024-03-13 PROCEDURE — 20604 DRAIN/INJ JOINT/BURSA W/US: CPT | Mod: F5 | Performed by: FAMILY MEDICINE

## 2024-03-13 PROCEDURE — 99214 OFFICE O/P EST MOD 30 MIN: CPT | Mod: 25 | Performed by: FAMILY MEDICINE

## 2024-03-13 RX ORDER — ROPIVACAINE HYDROCHLORIDE 5 MG/ML
0.5 INJECTION, SOLUTION EPIDURAL; INFILTRATION; PERINEURAL
Status: SHIPPED | OUTPATIENT
Start: 2024-03-13

## 2024-03-13 RX ORDER — TRIAMCINOLONE ACETONIDE 40 MG/ML
20 INJECTION, SUSPENSION INTRA-ARTICULAR; INTRAMUSCULAR
Status: SHIPPED | OUTPATIENT
Start: 2024-03-13

## 2024-03-13 RX ADMIN — TRIAMCINOLONE ACETONIDE 20 MG: 40 INJECTION, SUSPENSION INTRA-ARTICULAR; INTRAMUSCULAR at 16:16

## 2024-03-13 RX ADMIN — ROPIVACAINE HYDROCHLORIDE 0.5 ML: 5 INJECTION, SOLUTION EPIDURAL; INFILTRATION; PERINEURAL at 16:16

## 2024-03-13 NOTE — LETTER
3/13/2024         RE: Keena Tobias  [address]  [facility] [address]        Dear Colleague,    Thank you for referring your patient, Keena Tobias, to the [facility]. Please see a copy of my visit note below.    Keena Tobias  :  1980  DOS: 3/13/2024  MRN: [id number]    Sports Medicine Clinic Visit    PCP: Silvana Patton    Keena Tobias is a 44 year old Right hand dominant female who is seen in consultation at the request of  Silvana Patton D.O. presenting with right thumb pain.    Injury: Gradual onset of pain over the past 1 year(s).  Pain located over right CMC, primarily, has some pain on the left as well, nonradiating.  Additional Features:  Positive: swelling, numbness, and weakness.  Symptoms are better with aleve, bracing.  Symptoms are worse with: any use of her hands.  Other evaluation and/or treatments so far consists of: Ice, Heat, Aleve, Tylenol, Ibuprofen, and bracing, OT .  Recent imaging completed: X-rays completed 24.  Prior History of related problems: chronic     Social History:  sedentary for work,   loves to do crafting, knitting, gomez, painting, sewing     Review of Systems  Musculoskeletal: as above  Remainder of review of systems is negative including constitutional, CV, pulmonary, GI, Skin and Neurologic except as noted in HPI or medical history.    Past Medical History:   Diagnosis Date     Arthritis of carpometacarpal (CMC) joint of right thumb 10/3/2023     Depressive disorder      Major depressive disorder, recurrent, mild (H24) 2008    on wellbutrin     Thyrotoxicosis without mention of goiter or other cause, without mention of thyrotoxic crisis or storm     Graves - Dr. Sanchez     Tremor 2022     Past Surgical History:   Procedure Laterality Date     C/SECTION, LOW TRANSVERSE  11/3/10    , Low Transverse      SECTION N/A 2014     Procedure:  SECTION;  Surgeon: Rubina Birmingham MD;  Location: [facility]     [facility] APPENDECTOMY       Family History   Problem Relation Age of Onset     Hypertension Mother      Neurologic Disorder Mother         migranes     Lipids Mother      Breast Cancer Mother      Migraines Mother      Depression Father      Neurologic Disorder Father         MS     Musculoskeletal Disorder Father         MS     Obesity Father      Multiple Sclerosis Father      Mental Illness Father         ADHD     Depression Sister      Obesity Sister      Depression Brother      Allergies Brother      GI problems Brother      Depression Brother      GI problems Brother      C.A.D. Maternal Grandmother      Diabetes Maternal Grandmother      Circulatory Maternal Grandmother      Lipids Maternal Grandmother      Osteoporosis Maternal Grandmother      Breast Cancer Maternal Grandmother      C.A.D. Maternal Grandfather      Alcohol/Drug Maternal Grandfather      Depression Maternal Grandfather      C.A.D. Paternal Grandmother      Obesity Paternal Grandmother      Prostate Cancer Paternal Grandfather      Alcohol/Drug Paternal Grandfather      Obesity Paternal Grandfather      Other - See Comments Daughter      Depression Daughter      Anxiety Disorder Daughter      Eating Disorder Daughter      Multiple births Daughter      Mental Illness Daughter         ADHD     Other - See Comments Daughter      Mental Illness Daughter         AHDH     Multiple births Son      Other - See Comments Son      Attention Deficit Disorder Son      Neurologic Disorder Son         dyslexia     Mental Illness Son         ADHD     Breast Cancer Maternal Aunt      Eye Disorder No family hx of         self from graves disease, bulge and dryness     Thyroid Disease No family hx of         graves disease       Objective    General: healthy, alert and in no distress    HEENT: no scleral icterus or conjunctival erythema   Skin: no suspicious lesions or rash. No  jaundice.   CV: regular rhythm by palpation, 2+ distal pulses, no pedal edema    Resp: normal respiratory effort without conversational dyspnea   Psych: normal mood and affect    Gait: nonantalgic, appropriate coordination and balance   Neuro: normal light touch sensory exam of the extremities. Motor strength as noted below     Right Wrist and Hand exam    Inspection:       Swelling: mild generally about the thumb    Tender:       CMC joint of 1st digit(s)  right    Non Tender:       Remainder of the Wrist and Hand right    ROM:       Decreased active and passive ROM of the 1st CMC with flexion and opposition right    Strength:       5/5 strength in the muscles of the hand, wrist and forearm right    Special Tests:        neg (-) Finkelstein's maneuver right and       neg (-) laxity with UCL stress testing of the thumb right       + 1st CMC grind    Neurovascular:       2+ radial pulses bilaterally with brisk capillary refill and      normal sensation to light touch in the radial, median and ulnar nerve distributions    Radiology:  Results for orders placed or performed during the hospital encounter of 02/29/24   XR Wrist Right G/E 3 Views    Narrative    EXAM: XR WRIST RIGHT G/E 3 VIEWS  LOCATION: [facility]  DATE: 2/29/2024    INDICATION: Arthritis pain at base of thumb    COMPARISON: None.      Impression    IMPRESSION: Advanced degenerative change at the first CMC joint. No evidence for fracture. Normal carpal alignment. No erosive changes.       Hand / Upper Extremity Injection/Arthrocentesis: R thumb CMC    Date/Time: 3/13/2024 4:16 PM    Performed by: Shailesh Wade DO  Authorized by: Shailesh Wade DO    Indications:  Pain and therapeutic  Needle Size:  25 G  Guidance: ultrasound    Approach:  Radial  Condition: osteoarthritis    Location:  Thumb  Site:  R thumb CMC    Medications:  20 mg triamcinolone 40 MG/ML; 0.5 mL ROPivacaine 5 MG/ML  Outcome:  Tolerated  well, no immediate complications  Procedure discussed: discussed risks, benefits, and alternatives    Consent Given by:  Patient  Timeout: timeout called immediately prior to procedure    Prep: patient was prepped and draped in usual sterile fashion     Ultrasound images of procedure were permanently stored.       Assessment:  1. Chronic pain of right thumb    2. Arthritis of carpometacarpal (CMC) joint of right thumb        Plan:  Discussed the assessment with the patient.  Follow up: prn based on short term clinical progress  XR images independently visualized and reviewed with patient today in clinic  Advanced 1st CMC joint DJD noted, correlating with site of maximum pain  Has tried OTC medications, bracing, OT, activity modification without relief  Reviewed bracing options, topical NSAIDs, padded gloves as additional measures to try  Trial of diagnostic and hopefully therapeutic CSI to the right 1st CMC joint today  Given severity of underlying DJD would offer orthopedic hand surgery consultation in the future if interested in learning about surgical options  We discussed modified progressive pain-free activity as tolerated  Home handouts provided and supportive care reviewed  All questions were answered today  Contact us with additional questions or concerns  Signs and sx of concern reviewed    Thanks very much for sending this nice lady to us, I will keep you updated with her progress      Shailesh Wade DO, ELIZABETH  Sports Medicine Physician  [facility]        Disclaimer: This note consists of symbols derived from keyboarding, dictation and/or voice recognition software. As a result, there may be errors in the script that have gone undetected. Please consider this when interpreting information found in this chart.      Again, thank you for allowing me to participate in the care of your patient.        Sincerely,        Shailesh Wade DO

## 2024-03-13 NOTE — PROGRESS NOTES
Keena Tobias  :  1980  DOS: 3/13/2024  MRN: [id number]    Sports Medicine Clinic Visit    PCP: Silvana Patton    Keena Tobias is a 44 year old Right hand dominant female who is seen in consultation at the request of  Silvana Patton D.O. presenting with right thumb pain.    Injury: Gradual onset of pain over the past 1 year(s).  Pain located over right CMC, primarily, has some pain on the left as well, nonradiating.  Additional Features:  Positive: swelling, numbness, and weakness.  Symptoms are better with aleve, bracing.  Symptoms are worse with: any use of her hands.  Other evaluation and/or treatments so far consists of: Ice, Heat, Aleve, Tylenol, Ibuprofen, and bracing, OT .  Recent imaging completed: X-rays completed 24.  Prior History of related problems: chronic     Social History:  sedentary for work,   loves to do crafting, knitting, gomez, painting, sewing     Review of Systems  Musculoskeletal: as above  Remainder of review of systems is negative including constitutional, CV, pulmonary, GI, Skin and Neurologic except as noted in HPI or medical history.    Past Medical History:   Diagnosis Date    Arthritis of carpometacarpal (CMC) joint of right thumb 10/3/2023    Depressive disorder     Major depressive disorder, recurrent, mild (H24) 2008    on wellbutrin    Thyrotoxicosis without mention of goiter or other cause, without mention of thyrotoxic crisis or storm     [facility] - Dr. Sanchez    Tremor 2022     Past Surgical History:   Procedure Laterality Date    C/SECTION, LOW TRANSVERSE  11/3/10    , Low Transverse     SECTION N/A 2014    Procedure:  SECTION;  Surgeon: Rubina Birmingham MD;  Location:  L+D    [facility] APPENDECTOMY       Family History   Problem Relation Age of Onset    Hypertension Mother     Neurologic Disorder Mother         migranes    Lipids Mother     Breast Cancer Mother     Migraines Mother      Depression Father     Neurologic Disorder Father         MS    Musculoskeletal Disorder Father         MS    Obesity Father     Multiple Sclerosis Father     Mental Illness Father         ADHD    Depression Sister     Obesity Sister     Depression Brother     Allergies Brother     GI problems Brother     Depression Brother     GI problems Brother     C.A.D. Maternal Grandmother     Diabetes Maternal Grandmother     Circulatory Maternal Grandmother     Lipids Maternal Grandmother     Osteoporosis Maternal Grandmother     Breast Cancer Maternal Grandmother     C.A.D. Maternal Grandfather     Alcohol/Drug Maternal Grandfather     Depression Maternal Grandfather     C.A.D. Paternal Grandmother     Obesity Paternal Grandmother     Prostate Cancer Paternal Grandfather     Alcohol/Drug Paternal Grandfather     Obesity Paternal Grandfather     Other - See Comments Daughter     Depression Daughter     Anxiety Disorder Daughter     Eating Disorder Daughter     Multiple births Daughter     Mental Illness Daughter         ADHD    Other - See Comments Daughter     Mental Illness Daughter         AHDH    Multiple births Son     Other - See Comments Son     Attention Deficit Disorder Son     Neurologic Disorder Son         dyslexia    Mental Illness Son         ADHD    Breast Cancer Maternal Aunt     Eye Disorder No family hx of         self from graves disease, bulge and dryness    Thyroid Disease No family hx of         graves disease       Objective    General: healthy, alert and in no distress    HEENT: no scleral icterus or conjunctival erythema   Skin: no suspicious lesions or rash. No jaundice.   CV: regular rhythm by palpation, 2+ distal pulses, no pedal edema    Resp: normal respiratory effort without conversational dyspnea   Psych: normal mood and affect    Gait: nonantalgic, appropriate coordination and balance   Neuro: normal light touch sensory exam of the extremities. Motor strength as noted below     Right Wrist and  Hand exam    Inspection:       Swelling: mild generally about the thumb    Tender:       CMC joint of 1st digit(s)  right    Non Tender:       Remainder of the Wrist and Hand right    ROM:       Decreased active and passive ROM of the 1st CMC with flexion and opposition right    Strength:       5/5 strength in the muscles of the hand, wrist and forearm right    Special Tests:        neg (-) Finkelstein's maneuver right and       neg (-) laxity with UCL stress testing of the thumb right       + 1st CMC grind    Neurovascular:       2+ radial pulses bilaterally with brisk capillary refill and      normal sensation to light touch in the radial, median and ulnar nerve distributions    Radiology:  Results for orders placed or performed during the hospital encounter of 02/29/24   XR Wrist Right G/E 3 Views    Narrative    EXAM: XR WRIST RIGHT G/E 3 VIEWS  LOCATION: [facility]  DATE: 2/29/2024    INDICATION: Arthritis pain at base of thumb    COMPARISON: None.      Impression    IMPRESSION: Advanced degenerative change at the first CMC joint. No evidence for fracture. Normal carpal alignment. No erosive changes.       Hand / Upper Extremity Injection/Arthrocentesis: R thumb CMC    Date/Time: 3/13/2024 4:16 PM    Performed by: Shailesh Wade DO  Authorized by: Shailesh Wade DO    Indications:  Pain and therapeutic  Needle Size:  25 G  Guidance: ultrasound    Approach:  Radial  Condition: osteoarthritis    Location:  Thumb  Site:  R thumb CMC    Medications:  20 mg triamcinolone 40 MG/ML; 0.5 mL ROPivacaine 5 MG/ML  Outcome:  Tolerated well, no immediate complications  Procedure discussed: discussed risks, benefits, and alternatives    Consent Given by:  Patient  Timeout: timeout called immediately prior to procedure    Prep: patient was prepped and draped in usual sterile fashion     Ultrasound images of procedure were permanently stored.       Assessment:  1. Chronic pain of  right thumb    2. Arthritis of carpometacarpal (CMC) joint of right thumb        Plan:  Discussed the assessment with the patient.  Follow up: prn based on short term clinical progress  XR images independently visualized and reviewed with patient today in clinic  Advanced 1st CMC joint DJD noted, correlating with site of maximum pain  Has tried OTC medications, bracing, OT, activity modification without relief  Reviewed bracing options, topical NSAIDs, padded gloves as additional measures to try  Trial of diagnostic and hopefully therapeutic CSI to the right 1st CMC joint today  Given severity of underlying DJD would offer orthopedic hand surgery consultation in the future if interested in learning about surgical options  We discussed modified progressive pain-free activity as tolerated  Home handouts provided and supportive care reviewed  All questions were answered today  Contact us with additional questions or concerns  Signs and sx of concern reviewed    Thanks very much for sending this nice lady to us, I will keep you updated with her progress      Shailesh Wade DO, CAQ  Sports Medicine Physician  [facility]        Disclaimer: This note consists of symbols derived from keyboarding, dictation and/or voice recognition software. As a result, there may be errors in the script that have gone undetected. Please consider this when interpreting information found in this chart.

## 2024-04-05 ENCOUNTER — MYC MEDICAL ADVICE (OUTPATIENT)
Dept: ORTHOPEDICS | Facility: CLINIC | Age: 44
End: 2024-04-05
Payer: COMMERCIAL

## 2024-04-05 DIAGNOSIS — M79.644 CHRONIC PAIN OF RIGHT THUMB: ICD-10-CM

## 2024-04-05 DIAGNOSIS — M18.11 ARTHRITIS OF CARPOMETACARPAL (CMC) JOINT OF RIGHT THUMB: Primary | ICD-10-CM

## 2024-04-05 DIAGNOSIS — G89.29 CHRONIC PAIN OF RIGHT THUMB: ICD-10-CM

## 2024-04-22 ENCOUNTER — VIRTUAL VISIT (OUTPATIENT)
Dept: FAMILY MEDICINE | Facility: CLINIC | Age: 44
End: 2024-04-22
Payer: COMMERCIAL

## 2024-04-22 DIAGNOSIS — F33.1 MODERATE EPISODE OF RECURRENT MAJOR DEPRESSIVE DISORDER (H): Primary | ICD-10-CM

## 2024-04-22 PROCEDURE — 99213 OFFICE O/P EST LOW 20 MIN: CPT | Mod: 95 | Performed by: FAMILY MEDICINE

## 2024-04-22 RX ORDER — BUPROPION HYDROCHLORIDE 150 MG/1
150 TABLET ORAL EVERY MORNING
Qty: 5 TABLET | Refills: 0 | Status: SHIPPED | OUTPATIENT
Start: 2024-04-22 | End: 2024-05-07

## 2024-04-22 RX ORDER — BUPROPION HYDROCHLORIDE 300 MG/1
300 TABLET ORAL EVERY MORNING
Qty: 30 TABLET | Refills: 1 | Status: SHIPPED | OUTPATIENT
Start: 2024-04-22 | End: 2024-05-07

## 2024-04-22 ASSESSMENT — ANXIETY QUESTIONNAIRES
6. BECOMING EASILY ANNOYED OR IRRITABLE: NEARLY EVERY DAY
1. FEELING NERVOUS, ANXIOUS, OR ON EDGE: NEARLY EVERY DAY
2. NOT BEING ABLE TO STOP OR CONTROL WORRYING: SEVERAL DAYS
GAD7 TOTAL SCORE: 12
4. TROUBLE RELAXING: NEARLY EVERY DAY
5. BEING SO RESTLESS THAT IT IS HARD TO SIT STILL: SEVERAL DAYS
7. FEELING AFRAID AS IF SOMETHING AWFUL MIGHT HAPPEN: NOT AT ALL
7. FEELING AFRAID AS IF SOMETHING AWFUL MIGHT HAPPEN: NOT AT ALL
IF YOU CHECKED OFF ANY PROBLEMS ON THIS QUESTIONNAIRE, HOW DIFFICULT HAVE THESE PROBLEMS MADE IT FOR YOU TO DO YOUR WORK, TAKE CARE OF THINGS AT HOME, OR GET ALONG WITH OTHER PEOPLE: VERY DIFFICULT
3. WORRYING TOO MUCH ABOUT DIFFERENT THINGS: SEVERAL DAYS
8. IF YOU CHECKED OFF ANY PROBLEMS, HOW DIFFICULT HAVE THESE MADE IT FOR YOU TO DO YOUR WORK, TAKE CARE OF THINGS AT HOME, OR GET ALONG WITH OTHER PEOPLE?: VERY DIFFICULT

## 2024-04-22 ASSESSMENT — PATIENT HEALTH QUESTIONNAIRE - PHQ9
SUM OF ALL RESPONSES TO PHQ QUESTIONS 1-9: 18
10. IF YOU CHECKED OFF ANY PROBLEMS, HOW DIFFICULT HAVE THESE PROBLEMS MADE IT FOR YOU TO DO YOUR WORK, TAKE CARE OF THINGS AT HOME, OR GET ALONG WITH OTHER PEOPLE: VERY DIFFICULT
SUM OF ALL RESPONSES TO PHQ QUESTIONS 1-9: 18

## 2024-04-22 ASSESSMENT — ENCOUNTER SYMPTOMS: NERVOUS/ANXIOUS: 1

## 2024-04-22 NOTE — PROGRESS NOTES
"Keena is a 44 year old who is being evaluated via a billable video visit.    How would you like to obtain your AVS? MyChart  If the video visit is dropped, the invitation should be resent by: Text to cell phone: [phone]  Will anyone else be joining your video visit? No      Assessment & Plan     Moderate episode of recurrent major depressive disorder (H)  Trial wellbutrin and continue adderall.  Follow up in 4 weeks   - buPROPion (WELLBUTRIN XL) 150 MG 24 hr tablet; Take 1 tablet (150 mg) by mouth every morning  - buPROPion (WELLBUTRIN XL) 300 MG 24 hr tablet; Take 1 tablet (300 mg) by mouth every morning      20 minutes spent by me on the date of the encounter doing chart review, history and exam, documentation and further activities per the note      BMI  Estimated body mass index is 31.94 kg/m  as calculated from the following:    Height as of 9/22/23: 1.715 m (5' 7.5\").    Weight as of 9/22/23: 93.9 kg (207 lb).         Follow up 3 weeks recheck mood     Subjective   Keena is a 44 year old, presenting for the following health issues:  Depression and Anxiety        4/22/2024     4:50 PM   Additional Questions   Roomed by Delmy LAMAS   Accompanied by self         4/22/2024     4:50 PM   Patient Reported Additional Medications   Patient reports taking the following new medications none     Anxiety    History of Present Illness       Mental Health Follow-up:  Patient presents to follow-up on Depression.Patient's depression since last visit has been:  Worse  The patient is having other symptoms associated with depression.      Any significant life events: No  Patient is not feeling anxious or having panic attacks.  Patient has no concerns about alcohol or drug use.    She eats 4 or more servings of fruits and vegetables daily.She consumes 0 sweetened beverage(s) daily.She exercises with enough effort to increase her heart rate 9 or less minutes per day.  She exercises with enough effort to increase her heart " rate 3 or less days per week.   She is taking medications regularly.     She had been on wellbutrin and buspar for several years but stopped it when she started concerta through [employer].          Social History     Tobacco Use    Smoking status: Former     Current packs/day: 0.00     Types: Cigarettes     Passive exposure: Never    Smokeless tobacco: Never    Tobacco comments:     smoked from ages [age]   Vaping Use    Vaping status: Never Used   Substance Use Topics    Alcohol use: No     Comment: none due to being overeaters annonymous    Drug use: No         9/22/2023     6:57 AM 2/27/2024     8:40 AM 4/22/2024     4:47 PM   PHQ   PHQ-9 Total Score 8 7 18   Q9: Thoughts of better off dead/self-harm past 2 weeks Not at all Not at all Not at all         9/15/2023     9:55 AM 2/27/2024     8:41 AM 4/22/2024     4:47 PM   DAYANARA-7 SCORE   Total Score 8 (mild anxiety) 11 (moderate anxiety) 12 (moderate anxiety)   Total Score 8 11 12         4/22/2024     4:47 PM   Last PHQ-9   1.  Little interest or pleasure in doing things 3   2.  Feeling down, depressed, or hopeless 3   3.  Trouble falling or staying asleep, or sleeping too much 0   4.  Feeling tired or having little energy 3   5.  Poor appetite or overeating 2   6.  Feeling bad about yourself 3   7.  Trouble concentrating 3   8.  Moving slowly or restless 1   Q9: Thoughts of better off dead/self-harm past 2 weeks 0   PHQ-9 Total Score 18         4/22/2024     4:47 PM   DAYANARA-7    1. Feeling nervous, anxious, or on edge 3   2. Not being able to stop or control worrying 1   3. Worrying too much about different things 1   4. Trouble relaxing 3   5. Being so restless that it is hard to sit still 1   6. Becoming easily annoyed or irritable 3   7. Feeling afraid, as if something awful might happen 0   DAYANARA-7 Total Score 12   If you checked any problems, how difficult have they made it for you to do your work, take care of things at home, or get along with other people? Very  difficult       Suicide Assessment Five-step Evaluation and Treatment (SAFE-T)          Review of Systems  Constitutional, HEENT, cardiovascular, pulmonary, gi and gu systems are negative, except as otherwise noted.      Objective           Vitals:  No vitals were obtained today due to virtual visit.    Physical Exam   GENERAL: alert and no distress  EYES: Eyes grossly normal to inspection.  No discharge or erythema, or obvious scleral/conjunctival abnormalities.  RESP: No audible wheeze, cough, or visible cyanosis.    SKIN: Visible skin clear. No significant rash, abnormal pigmentation or lesions.  NEURO: Cranial nerves grossly intact.  Mentation and speech appropriate for age.  PSYCH: Appropriate affect, tone, and pace of words          Video-Visit Details    Type of service:  Video Visit   Originating Location (pt. Location): Home    Distant Location (provider location):  On-site  Platform used for Video Visit: Garcia  Signed Electronically by: Silvana Patton DO

## 2024-04-23 ENCOUNTER — TRANSFERRED RECORDS (OUTPATIENT)
Dept: HEALTH INFORMATION MANAGEMENT | Facility: CLINIC | Age: 44
End: 2024-04-23
Payer: COMMERCIAL

## 2024-05-07 ENCOUNTER — OFFICE VISIT (OUTPATIENT)
Dept: FAMILY MEDICINE | Facility: CLINIC | Age: 44
End: 2024-05-07
Payer: COMMERCIAL

## 2024-05-07 VITALS
BODY MASS INDEX: 31.37 KG/M2 | OXYGEN SATURATION: 99 % | DIASTOLIC BLOOD PRESSURE: 74 MMHG | HEIGHT: 68 IN | HEART RATE: 94 BPM | WEIGHT: 207 LBS | SYSTOLIC BLOOD PRESSURE: 118 MMHG | TEMPERATURE: 97.8 F | RESPIRATION RATE: 20 BRPM

## 2024-05-07 DIAGNOSIS — F90.0 ADHD (ATTENTION DEFICIT HYPERACTIVITY DISORDER), INATTENTIVE TYPE: Primary | ICD-10-CM

## 2024-05-07 DIAGNOSIS — F33.1 MODERATE EPISODE OF RECURRENT MAJOR DEPRESSIVE DISORDER (H): ICD-10-CM

## 2024-05-07 PROCEDURE — 91320 SARSCV2 VAC 30MCG TRS-SUC IM: CPT | Performed by: FAMILY MEDICINE

## 2024-05-07 PROCEDURE — 90480 ADMN SARSCOV2 VAC 1/ONLY CMP: CPT | Performed by: FAMILY MEDICINE

## 2024-05-07 PROCEDURE — 99213 OFFICE O/P EST LOW 20 MIN: CPT | Performed by: FAMILY MEDICINE

## 2024-05-07 RX ORDER — METHYLPHENIDATE HYDROCHLORIDE 36 MG/1
36 TABLET ORAL DAILY
Qty: 30 TABLET | Refills: 0 | Status: SHIPPED | OUTPATIENT
Start: 2024-08-17 | End: 2024-09-16

## 2024-05-07 RX ORDER — METHYLPHENIDATE HYDROCHLORIDE 5 MG/1
5 TABLET ORAL DAILY
Qty: 30 TABLET | Refills: 0 | Status: SHIPPED | OUTPATIENT
Start: 2024-08-17 | End: 2024-09-16

## 2024-05-07 RX ORDER — METHYLPHENIDATE HYDROCHLORIDE 36 MG/1
36 TABLET ORAL DAILY
Qty: 30 TABLET | Refills: 0 | Status: SHIPPED | OUTPATIENT
Start: 2024-07-17 | End: 2024-08-16

## 2024-05-07 RX ORDER — METHYLPHENIDATE HYDROCHLORIDE 5 MG/1
5 TABLET ORAL DAILY
Qty: 30 TABLET | Refills: 0 | Status: SHIPPED | OUTPATIENT
Start: 2024-07-17 | End: 2024-08-16

## 2024-05-07 RX ORDER — BUPROPION HYDROCHLORIDE 300 MG/1
300 TABLET ORAL EVERY MORNING
Qty: 90 TABLET | Refills: 1 | Status: SHIPPED | OUTPATIENT
Start: 2024-05-07

## 2024-05-07 RX ORDER — METHYLPHENIDATE HYDROCHLORIDE 36 MG/1
36 TABLET ORAL DAILY
Qty: 30 TABLET | Refills: 0 | Status: SHIPPED | OUTPATIENT
Start: 2024-06-17 | End: 2024-07-17

## 2024-05-07 RX ORDER — METHYLPHENIDATE HYDROCHLORIDE 5 MG/1
5 TABLET ORAL DAILY
Qty: 30 TABLET | Refills: 0 | Status: SHIPPED | OUTPATIENT
Start: 2024-06-17 | End: 2024-07-17

## 2024-05-07 ASSESSMENT — ANXIETY QUESTIONNAIRES
GAD7 TOTAL SCORE: 15
7. FEELING AFRAID AS IF SOMETHING AWFUL MIGHT HAPPEN: SEVERAL DAYS
6. BECOMING EASILY ANNOYED OR IRRITABLE: MORE THAN HALF THE DAYS
2. NOT BEING ABLE TO STOP OR CONTROL WORRYING: SEVERAL DAYS
IF YOU CHECKED OFF ANY PROBLEMS ON THIS QUESTIONNAIRE, HOW DIFFICULT HAVE THESE PROBLEMS MADE IT FOR YOU TO DO YOUR WORK, TAKE CARE OF THINGS AT HOME, OR GET ALONG WITH OTHER PEOPLE: SOMEWHAT DIFFICULT
1. FEELING NERVOUS, ANXIOUS, OR ON EDGE: NEARLY EVERY DAY
5. BEING SO RESTLESS THAT IT IS HARD TO SIT STILL: NEARLY EVERY DAY
3. WORRYING TOO MUCH ABOUT DIFFERENT THINGS: MORE THAN HALF THE DAYS
GAD7 TOTAL SCORE: 15
GAD7 TOTAL SCORE: 15
4. TROUBLE RELAXING: NEARLY EVERY DAY
8. IF YOU CHECKED OFF ANY PROBLEMS, HOW DIFFICULT HAVE THESE MADE IT FOR YOU TO DO YOUR WORK, TAKE CARE OF THINGS AT HOME, OR GET ALONG WITH OTHER PEOPLE?: SOMEWHAT DIFFICULT
7. FEELING AFRAID AS IF SOMETHING AWFUL MIGHT HAPPEN: SEVERAL DAYS

## 2024-05-07 ASSESSMENT — PAIN SCALES - GENERAL: PAINLEVEL: NO PAIN (0)

## 2024-05-07 ASSESSMENT — PATIENT HEALTH QUESTIONNAIRE - PHQ9
SUM OF ALL RESPONSES TO PHQ QUESTIONS 1-9: 11
10. IF YOU CHECKED OFF ANY PROBLEMS, HOW DIFFICULT HAVE THESE PROBLEMS MADE IT FOR YOU TO DO YOUR WORK, TAKE CARE OF THINGS AT HOME, OR GET ALONG WITH OTHER PEOPLE: SOMEWHAT DIFFICULT
SUM OF ALL RESPONSES TO PHQ QUESTIONS 1-9: 11

## 2024-05-07 NOTE — PROGRESS NOTES
"  Assessment & Plan     ADHD (attention deficit hyperactivity disorder), inattentive type  The current medical regimen is effective;  continue present plan and medications.    - methylphenidate HCl ER, OSM, (CONCERTA) 36 MG CR tablet; Take 1 tablet (36 mg) by mouth daily for 30 days  - methylphenidate HCl ER, OSM, (CONCERTA) 36 MG CR tablet; Take 1 tablet (36 mg) by mouth daily for 30 days  - methylphenidate HCl ER, OSM, (CONCERTA) 36 MG CR tablet; Take 1 tablet (36 mg) by mouth daily for 30 days  - methylphenidate (RITALIN) 5 MG tablet; Take 1 tablet (5 mg) by mouth daily for 30 days  - methylphenidate (RITALIN) 5 MG tablet; Take 1 tablet (5 mg) by mouth daily for 30 days  - methylphenidate (RITALIN) 5 MG tablet; Take 1 tablet (5 mg) by mouth daily for 30 days    Moderate episode of recurrent major depressive disorder (H)  The current medical regimen is effective;  continue present plan and medications.    - buPROPion (WELLBUTRIN XL) 300 MG 24 hr tablet; Take 1 tablet (300 mg) by mouth every morning      30 minutes spent by me on the date of the encounter doing chart review, history and exam, documentation and further activities per the note      BMI  Estimated body mass index is 31.94 kg/m  as calculated from the following:    Height as of this encounter: 1.715 m (5' 7.5\").    Weight as of this encounter: 93.9 kg (207 lb).       Follow up in 6 months     Murray Brink is a 44 year old, presenting for the following health issues:  Chronic Disease Management and A.D.H.D        5/7/2024     8:07 AM   Additional Questions   Roomed by Delmy LAMAS     History of Present Illness       Mental Health Follow-up:  Patient presents to follow-up on Depression.Patient's depression since last visit has been:  Better  The patient is not having other symptoms associated with depression.      Any significant life events: job concerns and health concerns  Patient is not feeling anxious or having panic attacks.  Patient has no " concerns about alcohol or drug use.    Reason for visit:  Adhd med check & depression check    She eats 4 or more servings of fruits and vegetables daily.She consumes 0 sweetened beverage(s) daily.She exercises with enough effort to increase her heart rate 20 to 29 minutes per day.  She exercises with enough effort to increase her heart rate 3 or less days per week.   She is taking medications regularly.     Medication Followup of Concerta and Ritalin   Taking Medication as prescribed: yes  Side Effects:  None  Medication Helping Symptoms:  yes    Wellbutrin was added a month ago.  She is currently taking Wellbutrin 300 mg daily.  She thinks it is helping some.       Social History     Tobacco Use    Smoking status: Former     Current packs/day: 0.00     Types: Cigarettes     Passive exposure: Never    Smokeless tobacco: Never    Tobacco comments:     smoked from ages [age]   Vaping Use    Vaping status: Never Used   Substance Use Topics    Alcohol use: No     Comment: none due to being overeaters annonymous    Drug use: No         2/27/2024     8:40 AM 4/22/2024     4:47 PM 5/7/2024     8:00 AM   PHQ   PHQ-9 Total Score 7 18 11   Q9: Thoughts of better off dead/self-harm past 2 weeks Not at all Not at all Not at all         2/27/2024     8:41 AM 4/22/2024     4:47 PM 5/7/2024     8:01 AM   DAYANARA-7 SCORE   Total Score 11 (moderate anxiety) 12 (moderate anxiety) 15 (severe anxiety)   Total Score 11 12 15         5/7/2024     8:00 AM   Last PHQ-9   1.  Little interest or pleasure in doing things 2   2.  Feeling down, depressed, or hopeless 1   3.  Trouble falling or staying asleep, or sleeping too much 1   4.  Feeling tired or having little energy 1   5.  Poor appetite or overeating 1   6.  Feeling bad about yourself 2   7.  Trouble concentrating 2   8.  Moving slowly or restless 1   Q9: Thoughts of better off dead/self-harm past 2 weeks 0   PHQ-9 Total Score 11         5/7/2024     8:01 AM   DAYANARA-7    1. Feeling  "nervous, anxious, or on edge 3   2. Not being able to stop or control worrying 1   3. Worrying too much about different things 2   4. Trouble relaxing 3   5. Being so restless that it is hard to sit still 3   6. Becoming easily annoyed or irritable 2   7. Feeling afraid, as if something awful might happen 1   DAYANARA-7 Total Score 15   If you checked any problems, how difficult have they made it for you to do your work, take care of things at home, or get along with other people? Somewhat difficult       Suicide Assessment Five-step Evaluation and Treatment (SAFE-T)          Review of Systems  Constitutional, HEENT, cardiovascular, pulmonary, gi and gu systems are negative, except as otherwise noted.      Objective    /74 (BP Location: Right arm, Patient Position: Sitting, Cuff Size: Adult Large)   Pulse 94   Temp 97.8  F (36.6  C) (Oral)   Resp 20   Ht 1.715 m (5' 7.5\")   Wt 93.9 kg (207 lb)   LMP 04/23/2024 (Approximate)   SpO2 99%   BMI 31.94 kg/m    Body mass index is 31.94 kg/m .  Physical Exam   GENERAL: alert and no distress  NECK: no adenopathy, no asymmetry, masses, or scars  RESP: lungs clear to auscultation - no rales, rhonchi or wheezes  CV: regular rate and rhythm, normal S1 S2, no S3 or S4, no murmur, click or rub, no peripheral edema  MS: no gross musculoskeletal defects noted, no edema  PSYCH: mentation appears normal, affect normal/bright    No results found for this or any previous visit (from the past 24 hour(s)).        Signed Electronically by: Silvana Patton DO    "

## 2024-06-27 ENCOUNTER — VIRTUAL VISIT (OUTPATIENT)
Dept: FAMILY MEDICINE | Facility: CLINIC | Age: 44
End: 2024-06-27
Payer: COMMERCIAL

## 2024-06-27 DIAGNOSIS — F50.9 EATING DISORDER, UNSPECIFIED TYPE: Primary | ICD-10-CM

## 2024-06-27 DIAGNOSIS — R73.9 ELEVATED BLOOD SUGAR: ICD-10-CM

## 2024-06-27 DIAGNOSIS — Z13.29 SCREENING FOR THYROID DISORDER: ICD-10-CM

## 2024-06-27 DIAGNOSIS — F90.0 ADHD (ATTENTION DEFICIT HYPERACTIVITY DISORDER), INATTENTIVE TYPE: ICD-10-CM

## 2024-06-27 PROCEDURE — 99214 OFFICE O/P EST MOD 30 MIN: CPT | Mod: 95 | Performed by: FAMILY MEDICINE

## 2024-06-27 NOTE — PROGRESS NOTES
Keena is a 44 year old who is being evaluated via a billable video visit.    How would you like to obtain your AVS? MyChart  If the video visit is dropped, the invitation should be resent by: Text to cell phone: [phone]  Will anyone else be joining your video visit? No      1. Eating disorder, unspecified type  She has been doing well with her diet , she does work out, she would like to get hgba1c test as her mom had abnormal labs.  She wants to start metformin if abnormal   - Hemoglobin A1c; Future  - Basic metabolic panel  (Ca, Cl, CO2, Creat, Gluc, K, Na, BUN); Future    2. Elevated blood sugar  Advised checking   - Hemoglobin A1c; Future  - Basic metabolic panel  (Ca, Cl, CO2, Creat, Gluc, K, Na, BUN); Future    3. Screening for thyroid disorder    - TSH with free T4 reflex; Future  - T4, free; Future    4. ADHD (attention deficit hyperactivity disorder), inattentive type  Stable, cont current treatment      Subjective   Keena is a 44 year old, presenting for the following health issues:  Chronic Disease Management        6/27/2024     9:33 AM   Additional Questions   Roomed by edita     History of Present Illness       Reason for visit:  Eating disorder diagnosed years ago, patient would like to chat about insulin resistance.      New patient to this provider  She reports of having weight issue all her life but has been maintaining, she does go to meetings, works out with yoga few times a week  Works with nutritionist closely, she is eating clean and portion is good  She has a hard time losing weight         Review of Systems  Constitutional, HEENT, cardiovascular, pulmonary, GI, , musculoskeletal, neuro, skin, endocrine and psych systems are negative, except as otherwise noted.      Objective           Vitals:  No vitals were obtained today due to virtual visit.    Physical Exam   GENERAL: alert and no distress  EYES: Eyes grossly normal to inspection.  No discharge or erythema, or obvious  scleral/conjunctival abnormalities.  RESP: No audible wheeze, cough, or visible cyanosis.    SKIN: Visible skin clear. No significant rash, abnormal pigmentation or lesions.  NEURO: Cranial nerves grossly intact.  Mentation and speech appropriate for age.  PSYCH: Appropriate affect, tone, and pace of words        Video-Visit Details    Type of service:  Video Visit   Originating Location (pt. Location): Home    Distant Location (provider location):  On-site  Platform used for Video Visit: Garcia  Signed Electronically by: Heladio Zayas DO

## 2024-08-05 ENCOUNTER — TELEPHONE (OUTPATIENT)
Dept: FAMILY MEDICINE | Facility: CLINIC | Age: 44
End: 2024-08-05

## 2024-08-05 ENCOUNTER — OFFICE VISIT (OUTPATIENT)
Dept: FAMILY MEDICINE | Facility: CLINIC | Age: 44
End: 2024-08-05
Payer: COMMERCIAL

## 2024-08-05 VITALS
DIASTOLIC BLOOD PRESSURE: 82 MMHG | HEART RATE: 71 BPM | RESPIRATION RATE: 16 BRPM | SYSTOLIC BLOOD PRESSURE: 109 MMHG | TEMPERATURE: 98 F | OXYGEN SATURATION: 100 %

## 2024-08-05 DIAGNOSIS — L72.3 INFECTED SEBACEOUS CYST OF SKIN: Primary | ICD-10-CM

## 2024-08-05 DIAGNOSIS — L08.9 INFECTED SEBACEOUS CYST OF SKIN: Primary | ICD-10-CM

## 2024-08-05 PROCEDURE — 99213 OFFICE O/P EST LOW 20 MIN: CPT | Performed by: PHYSICIAN ASSISTANT

## 2024-08-05 RX ORDER — CEPHALEXIN 500 MG/1
500 CAPSULE ORAL 3 TIMES DAILY
Qty: 30 CAPSULE | Refills: 0 | Status: SHIPPED | OUTPATIENT
Start: 2024-08-05 | End: 2024-08-15

## 2024-08-05 NOTE — PATIENT INSTRUCTIONS
1) Take antibiotic as prescribed. Take this medication with food to avoid stomach upset.   2) Ibuprofen or Tylenol as needed for fever or pain.  3) Follow up if worsening symptoms develop. Otherwise, follow up with Derm for excision at the end of the month.

## 2024-08-05 NOTE — PROGRESS NOTES
Patient presents with:  Cyst: Cyst under right breast. Cyst started about 2 months ago. Started to swell about 2 days ago and in the last 24 hours has gotten more painful.       Clinical Decision Making:  Physical exam is consistent with infected sebaceous cyst of the right breast.  No evidence of necrotic tissue.  Patient is vitally stable.  Patient started on cephalexin for treatment.  She is scheduled with dermatology at the end of the month for excision.      ICD-10-CM    1. Infected sebaceous cyst of skin  L72.3 cephALEXin (KEFLEX) 500 MG capsule    L08.9           Patient Instructions   1) Take antibiotic as prescribed. Take this medication with food to avoid stomach upset.   2) Ibuprofen or Tylenol as needed for fever or pain.  3) Follow up if worsening symptoms develop. Otherwise, follow up with Derm for excision at the end of the month.       HPI:  Keena Tobias is a 44 year old female who presents today with concerns of cyst under the right breast that was noted about 2 months ago.  It started to worsen and get more swollen 2 days ago and over the past 24 hours is gotten more painful.  Patient has been using topical hydrocolloid dressing and has noticed some drainage.  No fevers or chills.    History obtained from the patient.    Problem List:  2023-10: Chronic pain of right thumb  2023-10: Arthritis of carpometacarpal (CMC) joint of right thumb  2023: Moderate episode of recurrent major depressive disorder (H)  2022: ADHD (attention deficit hyperactivity disorder), inattentive   type  2022: Binge eating disorder  2022: Tremor  2021: Tibialis posterior tendonitis, left  2016: Postoperative hypothyroidism  2016: DAYANARA (generalized anxiety disorder)  2015: Anxiety  2014: S/P  section  2014-10: Encounter for triage in pregnant patient  2014-10: Labor and delivery indication for care or intervention  2014: Desires  (vaginal birth after )  trial  2014-08: Need for Tdap vaccination  2014-08: Encounter for supervision of other normal pregnancy  2014-04: Not immune to rubella  2014-04: Need for Tdap vaccination  2012-11: Acne  2011-06: Palpitations  2011-04: Moderate major depression (H)  2011-04: Graves disease  2011-04: Pregnancy, twins, delivered  2011-04: Compulsive overeating  2010-10: CARDIOVASCULAR SCREENING; LDL GOAL LESS THAN 160  2010-05: Twin Pregnancy, Antepartum (di/di)  2010-05: Varicose veins of legs  2010-04: Need for diphtheria-tetanus-pertussis (Tdap) vaccine, adult/  adolescent  2009-04: Cervicalgia  2009-04: Pain in thoracic spine  2009-01: Supervision of normal first pregnancy  2008-11: Infertilities, female  2008-11: Vaginitis  2008-08: Major depressive disorder, recurrent, mild (H24)  2006-06: Allergic rhinitis  2006-06: Migraine variant  Thyrotoxicosis  Depressive disorder, not elsewhere classified      Past Medical History:   Diagnosis Date    Arthritis of carpometacarpal (CMC) joint of right thumb 10/3/2023    Depressive disorder     Major depressive disorder, recurrent, mild (H24) 08/14/2008    on wellbutrin    Thyrotoxicosis without mention of goiter or other cause, without mention of thyrotoxic crisis or storm 2004    Graves - Dr. Sanchez    Tremor 04/29/2022       Social History     Tobacco Use    Smoking status: Former     Current packs/day: 0.00     Types: Cigarettes     Passive exposure: Never    Smokeless tobacco: Never    Tobacco comments:     smoked from ages [age]   Substance Use Topics    Alcohol use: No     Comment: none due to being overeaters annonymous         Review of Systems    Vitals:    08/05/24 1742   BP: 109/82   Pulse: 71   Resp: 16   Temp: 98  F (36.7  C)   TempSrc: Oral   SpO2: 100%       Physical Exam  Vitals and nursing note reviewed.   Constitutional:       General: She is not in acute distress.     Appearance: She is not toxic-appearing or diaphoretic.   HENT:      Head: Normocephalic and atraumatic.       Right Ear: External ear normal.      Left Ear: External ear normal.   Eyes:      Conjunctiva/sclera: Conjunctivae normal.   Pulmonary:      Effort: Pulmonary effort is normal. No respiratory distress.   Chest:       Neurological:      Mental Status: She is alert.   Psychiatric:         Mood and Affect: Mood normal.         Behavior: Behavior normal.         Thought Content: Thought content normal.         Judgment: Judgment normal.           At the end of the encounter, I discussed results, diagnosis, medications. Discussed red flags for immediate return to clinic/ER, as well as indications for follow up if no improvement. Patient understood and agreed to plan. Patient was stable for discharge.

## 2024-08-06 NOTE — TELEPHONE ENCOUNTER
Patient contacted the clinic because her regular pharmacy cannot fill her prescription for Cephalexin.  She asked that we call it in to another pharmacy.  Called prescription in to [facility] located at [address] [place] [address] CLAY Balderas.

## 2024-08-23 ENCOUNTER — PATIENT OUTREACH (OUTPATIENT)
Dept: CARE COORDINATION | Facility: CLINIC | Age: 44
End: 2024-08-23
Payer: COMMERCIAL

## 2024-08-29 ENCOUNTER — TRANSFERRED RECORDS (OUTPATIENT)
Dept: HEALTH INFORMATION MANAGEMENT | Facility: CLINIC | Age: 44
End: 2024-08-29
Payer: COMMERCIAL

## 2024-09-06 ENCOUNTER — PATIENT OUTREACH (OUTPATIENT)
Dept: CARE COORDINATION | Facility: CLINIC | Age: 44
End: 2024-09-06
Payer: COMMERCIAL

## 2024-09-19 DIAGNOSIS — F90.9 ATTENTION DEFICIT HYPERACTIVITY DISORDER (ADHD), UNSPECIFIED ADHD TYPE: ICD-10-CM

## 2024-09-19 RX ORDER — METHYLPHENIDATE HYDROCHLORIDE 36 MG/1
36 TABLET ORAL DAILY
Qty: 30 TABLET | Refills: 0 | Status: SHIPPED | OUTPATIENT
Start: 2024-09-19

## 2024-09-23 ENCOUNTER — LAB (OUTPATIENT)
Dept: LAB | Facility: HOSPITAL | Age: 44
End: 2024-09-23
Attending: FAMILY MEDICINE
Payer: COMMERCIAL

## 2024-09-23 ENCOUNTER — ANCILLARY PROCEDURE (OUTPATIENT)
Dept: MAMMOGRAPHY | Facility: CLINIC | Age: 44
End: 2024-09-23
Attending: FAMILY MEDICINE
Payer: COMMERCIAL

## 2024-09-23 DIAGNOSIS — Z13.29 SCREENING FOR THYROID DISORDER: ICD-10-CM

## 2024-09-23 DIAGNOSIS — Z12.31 VISIT FOR SCREENING MAMMOGRAM: ICD-10-CM

## 2024-09-23 DIAGNOSIS — F50.9 EATING DISORDER, UNSPECIFIED TYPE: ICD-10-CM

## 2024-09-23 DIAGNOSIS — R73.9 ELEVATED BLOOD SUGAR: ICD-10-CM

## 2024-09-23 LAB
ANION GAP SERPL CALCULATED.3IONS-SCNC: 8 MMOL/L (ref 7–15)
BUN SERPL-MCNC: 14.8 MG/DL (ref 6–20)
CALCIUM SERPL-MCNC: 9.1 MG/DL (ref 8.8–10.4)
CHLORIDE SERPL-SCNC: 102 MMOL/L (ref 98–107)
CREAT SERPL-MCNC: 0.91 MG/DL (ref 0.51–0.95)
EGFRCR SERPLBLD CKD-EPI 2021: 79 ML/MIN/1.73M2
EST. AVERAGE GLUCOSE BLD GHB EST-MCNC: 103 MG/DL
GLUCOSE SERPL-MCNC: 100 MG/DL (ref 70–99)
HBA1C MFR BLD: 5.2 %
HCO3 SERPL-SCNC: 29 MMOL/L (ref 22–29)
POTASSIUM SERPL-SCNC: 4.5 MMOL/L (ref 3.4–5.3)
SODIUM SERPL-SCNC: 139 MMOL/L (ref 135–145)
T4 FREE SERPL-MCNC: 1.34 NG/DL (ref 0.9–1.7)
TSH SERPL DL<=0.005 MIU/L-ACNC: 2.77 UIU/ML (ref 0.3–4.2)

## 2024-09-23 PROCEDURE — 80048 BASIC METABOLIC PNL TOTAL CA: CPT

## 2024-09-23 PROCEDURE — 83036 HEMOGLOBIN GLYCOSYLATED A1C: CPT

## 2024-09-23 PROCEDURE — 84439 ASSAY OF FREE THYROXINE: CPT

## 2024-09-23 PROCEDURE — 77063 BREAST TOMOSYNTHESIS BI: CPT

## 2024-09-23 PROCEDURE — 36415 COLL VENOUS BLD VENIPUNCTURE: CPT

## 2024-09-23 PROCEDURE — 84443 ASSAY THYROID STIM HORMONE: CPT

## 2024-09-28 NOTE — TELEPHONE ENCOUNTER
DIAGNOSIS: (R) Thumb / XR / Shailesh Wade DO in [facility] / [employer]    APPOINTMENT DATE: 10/7/24   NOTES STATUS DETAILS   OFFICE NOTE from referring provider Internal 3/13/24 - Shailesh Wade DO - Sports    OFFICE NOTE from other specialist Internal 2/27/24 - Silvana Patton DO - Family Med    MEDICATION LIST Internal    XRAYS (IMAGES & REPORTS) Internal 2/29/24 - XR Wrist Right

## 2024-10-07 ENCOUNTER — OFFICE VISIT (OUTPATIENT)
Dept: ORTHOPEDICS | Facility: CLINIC | Age: 44
End: 2024-10-07
Attending: FAMILY MEDICINE
Payer: COMMERCIAL

## 2024-10-07 ENCOUNTER — PRE VISIT (OUTPATIENT)
Dept: ORTHOPEDICS | Facility: CLINIC | Age: 44
End: 2024-10-07

## 2024-10-07 DIAGNOSIS — M18.11 ARTHRITIS OF CARPOMETACARPAL (CMC) JOINT OF RIGHT THUMB: ICD-10-CM

## 2024-10-07 PROCEDURE — 99204 OFFICE O/P NEW MOD 45 MIN: CPT | Performed by: STUDENT IN AN ORGANIZED HEALTH CARE EDUCATION/TRAINING PROGRAM

## 2024-10-07 NOTE — PROGRESS NOTES
"Hand Surgery History & Physical    REFERRING PHYSICIAN: Shailesh Wade   PRIMARY CARE PHYSICIAN: Silvana Patton     Chief Complaint:   No chief complaint on file.      History of Present Illness:     Keena Tobias is a 44 year old right-hand dominant female who presents for evaluation of bilateral thumb pain at the CMC joints.    Patient endorses chronic symptoms, present for years, which began worsening in the summer of .  She began hand therapy 10/3/23 due to rapid progression of pain.  Patient was previously seen by Dr. Shailesh Wade at the [facility] clinic, most recently on 3/13/24, at which time an US-guided injection of the right thumb was performed.  Patient states the injection was miraculous and allowed her to perform all desired activities.  She is uncertain when it wore off, but does believe she started experiencing symptoms again before July.  At rest and with normal daily movements patient notes she does not currently have pain, but she \"can just feel it\".  Pain is worsened by repetitive motions, pressing, gripping, and any movement involving opposition.  Patient has transitioned to painting over other forms of art and crafting, such as knitting or gomez, due to pain.  She participates in yoga, modifying as needed to decrease stress through the thumb.    Occupation:  at a law firm.    Past Medical History:     Past Medical History:   Diagnosis Date    Arthritis of carpometacarpal (CMC) joint of right thumb 10/3/2023    Depressive disorder     Major depressive disorder, recurrent, mild (H) 2008    on wellbutrin    Thyrotoxicosis without mention of goiter or other cause, without mention of thyrotoxic crisis or storm     Graves - Dr. Sanchez    Tremor 2022       Past Surgical History:     Past Surgical History:   Procedure Laterality Date    C/SECTION, LOW TRANSVERSE  11/3/10    , Low Transverse     SECTION N/A 2014    Procedure: "  SECTION;  Surgeon: Rubina Birmingham MD;  Location:  L+D    [facility] APPENDECTOMY         Social History:     Social History     Tobacco Use    Smoking status: Former     Current packs/day: 0.00     Types: Cigarettes     Passive exposure: Never    Smokeless tobacco: Never    Tobacco comments:     smoked from ages [age]   Substance Use Topics    Alcohol use: No     Comment: none due to being overeaters annonymous       Family History:     Family History   Problem Relation Age of Onset    Hypertension Mother     Neurologic Disorder Mother         migranes    Lipids Mother     Breast Cancer Mother     Migraines Mother     Depression Father     Neurologic Disorder Father         MS    Musculoskeletal Disorder Father         MS    Obesity Father     Multiple Sclerosis Father     Mental Illness Father         ADHD    Depression Sister     Obesity Sister     Depression Brother     Allergies Brother     GI problems Brother     Depression Brother     GI problems Brother     C.A.D. Maternal Grandmother     Diabetes Maternal Grandmother     Circulatory Maternal Grandmother     Lipids Maternal Grandmother     Osteoporosis Maternal Grandmother     Breast Cancer Maternal Grandmother     C.A.D. Maternal Grandfather     Alcohol/Drug Maternal Grandfather     Depression Maternal Grandfather     C.A.D. Paternal Grandmother     Obesity Paternal Grandmother     Prostate Cancer Paternal Grandfather     Alcohol/Drug Paternal Grandfather     Obesity Paternal Grandfather     Other - See Comments Daughter     Depression Daughter     Anxiety Disorder Daughter     Eating Disorder Daughter     Multiple births Daughter     Mental Illness Daughter         ADHD    Other - See Comments Daughter     Mental Illness Daughter         AHDH    Multiple births Son     Other - See Comments Son     Attention Deficit Disorder Son     Neurologic Disorder Son         dyslexia    Mental Illness Son         ADHD    Breast Cancer Maternal Aunt     Eye  Disorder No family hx of         self from graves disease, bulge and dryness    Thyroid Disease No family hx of         graves disease       Allergies:     Allergies   Allergen Reactions    Allegra [Fexofenadine Hydrochloride]      Heart palpitations? sensitivity       Medications:     Current Outpatient Medications   Medication Sig Dispense Refill    buPROPion (WELLBUTRIN XL) 300 MG 24 hr tablet Take 1 tablet (300 mg) by mouth every morning 90 tablet 1    Calcium Citrate-Vitamin D (CALCIUM CITRATE + PO) Take 2 tablets by mouth daily 650mg per serving      clindamycin (CLEOCIN T) 1 % external lotion APPLY TO FACE ONCE DAILY.      levothyroxine (SYNTHROID/LEVOTHROID) 150 MCG tablet Take 150 mcg by mouth daily      methylphenidate HCl ER, OSM, (CONCERTA) 36 MG CR tablet Take 1 tablet (36 mg) by mouth daily. Patient needs follow-up appointment 30 tablet 0    tretinoin (RETIN-A) 0.025 % external cream APPLY PEA SIZE AMOUNT TO ENTIRE FACE EVERY OTHER NIGHT INCREASING TO NIGHTLY AS TOLERATED.FOLLOW WITH MOISTURIZER.      vitamin D3 (CHOLECALCIFEROL) 50 mcg (2000 units) tablet Take 2,000 Units by mouth daily       Current Facility-Administered Medications   Medication Dose Route Frequency Provider Last Rate Last Admin    0.5 mL ropivacaine (NAROPIN) injection 5 mg/mL  0.5 mL   Shailesh Wade DO   0.5 mL at 03/13/24 1616    triamcinolone (KENALOG-40) injection 20 mg  20 mg   Shailesh Wade DO   20 mg at 03/13/24 1616        Review of Systems:     A 10 point ROS was performed and reviewed. Specific responses to these questions are noted at the end of the document.    Physical Exam:   Physical Exam Adult:  General: Well-nourished, alert, cooperative with exam and in no acute distress  HEENT: Atraumatic, normocephalic, extraocular movements intact, moist mucous membranes, trachea midline  Pulmonary: Unlabored work of breathing, on room air  Cardiovascular: Warm and well-perfused extremities. 2+ radial  pulses  Skin: Warm, intact without rashes to the upper extremities, head or neck   Gait: Normal  Neuro/psych: Oriented to time, place and person. Affect is appropriate    Musculoskeletal: A focused physical examination of the bilateral thumbs reveals:   Inspection  No redness, effusion or ecchymosis present.  Palpation - CMC tender to palpation dorsally on bilateral thumbs L>R, + CMC grind  Neurovascular- intact light touch sensation and motor to distribution of the median, ulnar and radial nerves, symmetric bilaterally. Brisk capillary refill to the distal fingers. 2+ radial pulse.   Range of Motion: Full range of motion bilaterally in wrist flexion, extension, circumduction, and radial/ulnar deviation.    Muscle strength and tone: 5/5 strength EPL, FPL, APB, first dorsal interosseous.               Imaging:   3 view X-ray of the right wrist 2/29/2024 were independently interpreted by me. The results were discussed with the patient.  Findings include:    DJD of the 1st CMC joint with joint space narrowing, subluxation, and osteophyte formation    Assessment and Plan:   Assessment:  44 year old female with bilateral CMC arthritis     Plan: We discussed the management of CMC arthritis, with conservative management including NSAIDs, steroid injections, bracing, and hand therapy.   Splinting recommendations include specifically the Push MetaGrip and Comfort Cool CMC braces, both of which were shown to patient.  Steroid injections were offered, but as patient states she is not currently in pain she would prefer to wait to receive the most benefit.  Hand therapy was also discussed, but as patient has completed it in the past, she is comfortable with her current level of proficiency in performing it at home.    Surgical options for CMC arthritis were discussed, which  involve removal of the trapezium and suspension of the metacarpal using surrounding soft tissues.  I discussed  strength becomes diminished after this  surgery.    With all of the information provided, using shared decision making, the patient currently would like to move forward with splinting/bracing and will return for injections when needed for pain.  She will reach out to schedule when her symptoms increase in intensity.    All questions answered.  The patient voiced understanding and agreement.    This visit and documentation were initiated by Dominique Lizarraga, Medical Student.  All statements and findings were reviewed for accuracy and confirmed by me through direct patient evaluation.    Trevor Sanders MD    Hand & Upper Extremity Surgery  Department of Orthopedic Surgery  [facility]

## 2024-10-07 NOTE — LETTER
"10/7/2024      Keena Tobias  [address]  [place] [address]      Dear Colleague,    Thank you for referring your patient, Keena Tobias, to the [facility] [place]. Please see a copy of my visit note below.    Hand Surgery History & Physical    REFERRING PHYSICIAN: Shailesh Wade   PRIMARY CARE PHYSICIAN: Silvana Patton     Chief Complaint:   No chief complaint on file.      History of Present Illness:     Keena Tobias is a 44 year old right-hand dominant female who presents for evaluation of bilateral thumb pain at the CMC joints.    Patient endorses chronic symptoms, present for years, which began worsening in the summer of 2022.  She began hand therapy 10/3/23 due to rapid progression of pain.  Patient was previously seen by Dr. Shailesh Wade at the [facility] Medicine clinic, most recently on 3/13/24, at which time an US-guided injection of the right thumb was performed.  Patient states the injection was miraculous and allowed her to perform all desired activities.  She is uncertain when it wore off, but does believe she started experiencing symptoms again before July.  At rest and with normal daily movements patient notes she does not currently have pain, but she \"can just feel it\".  Pain is worsened by repetitive motions, pressing, gripping, and any movement involving opposition.  Patient has transitioned to painting over other forms of art and crafting, such as knitting or gomez, due to pain.  She participates in yoga, modifying as needed to decrease stress through the thumb.    Occupation:  at a law firm.    Past Medical History:     Past Medical History:   Diagnosis Date     Arthritis of carpometacarpal (CMC) joint of right thumb 10/3/2023     Depressive disorder      Major depressive disorder, recurrent, mild (H) 08/14/2008    on wellbutrin     Thyrotoxicosis without mention of goiter or other cause, without mention of " thyrotoxic crisis or storm     Graves - Dr. Sanchez     Tremor 2022       Past Surgical History:     Past Surgical History:   Procedure Laterality Date     C/SECTION, LOW TRANSVERSE  11/3/10    , Low Transverse      SECTION N/A 2014    Procedure:  SECTION;  Surgeon: Rubina Birmingham MD;  Location: [facility]D     [facility] APPENDECTOMY         Social History:     Social History     Tobacco Use     Smoking status: Former     Current packs/day: 0.00     Types: Cigarettes     Passive exposure: Never     Smokeless tobacco: Never     Tobacco comments:     smoked from ages [age]   Substance Use Topics     Alcohol use: No     Comment: none due to being overeaters annonymous       Family History:     Family History   Problem Relation Age of Onset     Hypertension Mother      Neurologic Disorder Mother         migranes     Lipids Mother      Breast Cancer Mother      Migraines Mother      Depression Father      Neurologic Disorder Father         MS     Musculoskeletal Disorder Father         MS     Obesity Father      Multiple Sclerosis Father      Mental Illness Father         ADHD     Depression Sister      Obesity Sister      Depression Brother      Allergies Brother      GI problems Brother      Depression Brother      GI problems Brother      C.A.D. Maternal Grandmother      Diabetes Maternal Grandmother      Circulatory Maternal Grandmother      Lipids Maternal Grandmother      Osteoporosis Maternal Grandmother      Breast Cancer Maternal Grandmother      C.A.D. Maternal Grandfather      Alcohol/Drug Maternal Grandfather      Depression Maternal Grandfather      C.A.D. Paternal Grandmother      Obesity Paternal Grandmother      Prostate Cancer Paternal Grandfather      Alcohol/Drug Paternal Grandfather      Obesity Paternal Grandfather      Other - See Comments Daughter      Depression Daughter      Anxiety Disorder Daughter      Eating Disorder Daughter      Multiple births Daughter       Mental Illness Daughter         ADHD     Other - See Comments Daughter      Mental Illness Daughter         AH     Multiple births Son      Other - See Comments Son      Attention Deficit Disorder Son      Neurologic Disorder Son         dyslexia     Mental Illness Son         ADHD     Breast Cancer Maternal Aunt      Eye Disorder No family hx of         self from graves disease, bulge and dryness     Thyroid Disease No family hx of         graves disease       Allergies:     Allergies   Allergen Reactions     Allegra [Fexofenadine Hydrochloride]      Heart palpitations? sensitivity       Medications:     Current Outpatient Medications   Medication Sig Dispense Refill     buPROPion (WELLBUTRIN XL) 300 MG 24 hr tablet Take 1 tablet (300 mg) by mouth every morning 90 tablet 1     Calcium Citrate-Vitamin D (CALCIUM CITRATE + PO) Take 2 tablets by mouth daily 650mg per serving       clindamycin (CLEOCIN T) 1 % external lotion APPLY TO FACE ONCE DAILY.       levothyroxine (SYNTHROID/LEVOTHROID) 150 MCG tablet Take 150 mcg by mouth daily       methylphenidate HCl ER, OSM, (CONCERTA) 36 MG CR tablet Take 1 tablet (36 mg) by mouth daily. Patient needs follow-up appointment 30 tablet 0     tretinoin (RETIN-A) 0.025 % external cream APPLY PEA SIZE AMOUNT TO ENTIRE FACE EVERY OTHER NIGHT INCREASING TO NIGHTLY AS TOLERATED.FOLLOW WITH MOISTURIZER.       vitamin D3 (CHOLECALCIFEROL) 50 mcg (2000 units) tablet Take 2,000 Units by mouth daily       Current Facility-Administered Medications   Medication Dose Route Frequency Provider Last Rate Last Admin     0.5 mL ropivacaine (NAROPIN) injection 5 mg/mL  0.5 mL   Shailesh Wade DO   0.5 mL at 03/13/24 1616     triamcinolone (KENALOG-40) injection 20 mg  20 mg   Shailesh Wade DO   20 mg at 03/13/24 1616        Review of Systems:     A 10 point ROS was performed and reviewed. Specific responses to these questions are noted at the end of the  document.    Physical Exam:   Physical Exam Adult:  General: Well-nourished, alert, cooperative with exam and in no acute distress  HEENT: Atraumatic, normocephalic, extraocular movements intact, moist mucous membranes, trachea midline  Pulmonary: Unlabored work of breathing, on room air  Cardiovascular: Warm and well-perfused extremities. 2+ radial pulses  Skin: Warm, intact without rashes to the upper extremities, head or neck   Gait: Normal  Neuro/psych: Oriented to time, place and person. Affect is appropriate    Musculoskeletal: A focused physical examination of the bilateral thumbs reveals:   Inspection  No redness, effusion or ecchymosis present.  Palpation - CMC tender to palpation dorsally on bilateral thumbs L>R, + CMC grind  Neurovascular- intact light touch sensation and motor to distribution of the median, ulnar and radial nerves, symmetric bilaterally. Brisk capillary refill to the distal fingers. 2+ radial pulse.   Range of Motion: Full range of motion bilaterally in wrist flexion, extension, circumduction, and radial/ulnar deviation.    Muscle strength and tone: 5/5 strength EPL, FPL, APB, first dorsal interosseous.               Imaging:   3 view X-ray of the right wrist 2/29/2024 were independently interpreted by me. The results were discussed with the patient.  Findings include:    DJD of the 1st CMC joint with joint space narrowing, subluxation, and osteophyte formation    Assessment and Plan:   Assessment:  44 year old female with bilateral CMC arthritis     Plan: We discussed the management of CMC arthritis, with conservative management including NSAIDs, steroid injections, bracing, and hand therapy.   Splinting recommendations include specifically the Push MetaGrip and Comfort Cool CMC braces, both of which were shown to patient.  Steroid injections were offered, but as patient states she is not currently in pain she would prefer to wait to receive the most benefit.  Hand therapy was also  discussed, but as patient has completed it in the past, she is comfortable with her current level of proficiency in performing it at home.    Surgical options for CMC arthritis were discussed, which  involve removal of the trapezium and suspension of the metacarpal using surrounding soft tissues.  I discussed  strength becomes diminished after this surgery.    With all of the information provided, using shared decision making, the patient currently would like to move forward with splinting/bracing and will return for injections when needed for pain.  She will reach out to schedule when her symptoms increase in intensity.    All questions answered.  The patient voiced understanding and agreement.    This visit and documentation were initiated by Dominique Lizarraga, Medical Student.  All statements and findings were reviewed for accuracy and confirmed by me through direct patient evaluation.    Trevor Sanders MD    Hand & Upper Extremity Surgery  Department of Orthopedic Surgery  [facility]        Again, thank you for allowing me to participate in the care of your patient.        Sincerely,        Trevor Sanders MD

## 2024-10-07 NOTE — NURSING NOTE
Reason For Visit:   Chief Complaint   Patient presents with    Consult     Bilateral thumb CMC osteoarthritis        Primary MD: Silvana Patton  Ref. MD: Emilia    Age: 44 year old    ?  No      There were no vitals taken for this visit.      Pain Assessment  Patient Currently in Pain: Yes  0-10 Pain Scale: 5  Primary Pain Location: Finger (Comment which one) (Bilateral thumbs)  Pain Descriptors: Sore, Aching, Constant    Hand Dominance Evaluation  Hand Dominance: Ambidextrous          QuickDASH Assessment      10/7/2024     9:36 AM   QuickDASH Main   1. Open a tight or new jar Moderate difficulty   2. Do heavy household chores (e.g., wash walls, floors) No difficulty   3. Carry a shopping bag or briefcase No difficulty   4. Wash your back No difficulty   5. Use a knife to cut food Moderate difficulty   6. Recreational activities in which you take some force or impact through your arm, shoulder or hand (e.g., golf, hammering, tennis, etc.) Unable to answer   7. During the past week, to what extent has your arm, shoulder or hand problem interfered with your normal social activities with family, friends, neighbours or groups Slightly   8. During the past week, were you limited in your work or other regular daily activities as a result of your arm, shoulder or hand problem Moderately limited   9. Arm, shoulder or hand pain Mild   10.Tingling (pins and needles) in your arm,shoulder or hand None   11. During the past week, how much difficulty have you had sleeping because of the pain in your arm, shoulder or hand No difficulty   Quickdash Ability Score 15.91          Current Outpatient Medications   Medication Sig Dispense Refill    buPROPion (WELLBUTRIN XL) 300 MG 24 hr tablet Take 1 tablet (300 mg) by mouth every morning 90 tablet 1    Calcium Citrate-Vitamin D (CALCIUM CITRATE + PO) Take 2 tablets by mouth daily 650mg per serving      clindamycin (CLEOCIN T) 1 % external lotion APPLY TO FACE ONCE DAILY.       levothyroxine (SYNTHROID/LEVOTHROID) 150 MCG tablet Take 150 mcg by mouth daily      methylphenidate HCl ER, OSM, (CONCERTA) 36 MG CR tablet Take 1 tablet (36 mg) by mouth daily. Patient needs follow-up appointment 30 tablet 0    tretinoin (RETIN-A) 0.025 % external cream APPLY PEA SIZE AMOUNT TO ENTIRE FACE EVERY OTHER NIGHT INCREASING TO NIGHTLY AS TOLERATED.FOLLOW WITH MOISTURIZER.      vitamin D3 (CHOLECALCIFEROL) 50 mcg (2000 units) tablet Take 2,000 Units by mouth daily         Allergies   Allergen Reactions    Allegra [Fexofenadine Hydrochloride]      Heart palpitations? sensitivity       Anna Lara, ATC

## 2024-10-18 DIAGNOSIS — F90.9 ATTENTION DEFICIT HYPERACTIVITY DISORDER (ADHD), UNSPECIFIED ADHD TYPE: ICD-10-CM

## 2024-10-20 ASSESSMENT — PATIENT HEALTH QUESTIONNAIRE - PHQ9
SUM OF ALL RESPONSES TO PHQ QUESTIONS 1-9: 6
10. IF YOU CHECKED OFF ANY PROBLEMS, HOW DIFFICULT HAVE THESE PROBLEMS MADE IT FOR YOU TO DO YOUR WORK, TAKE CARE OF THINGS AT HOME, OR GET ALONG WITH OTHER PEOPLE: SOMEWHAT DIFFICULT
SUM OF ALL RESPONSES TO PHQ QUESTIONS 1-9: 6

## 2024-10-21 ENCOUNTER — VIRTUAL VISIT (OUTPATIENT)
Dept: FAMILY MEDICINE | Facility: CLINIC | Age: 44
End: 2024-10-21
Payer: COMMERCIAL

## 2024-10-21 DIAGNOSIS — F90.0 ADHD (ATTENTION DEFICIT HYPERACTIVITY DISORDER), INATTENTIVE TYPE: Primary | ICD-10-CM

## 2024-10-21 PROCEDURE — 99213 OFFICE O/P EST LOW 20 MIN: CPT | Mod: 95 | Performed by: PHYSICIAN ASSISTANT

## 2024-10-21 RX ORDER — METHYLPHENIDATE HYDROCHLORIDE 36 MG/1
36 TABLET ORAL EVERY MORNING
Qty: 30 TABLET | Refills: 0 | Status: SHIPPED | OUTPATIENT
Start: 2024-10-21 | End: 2024-11-20

## 2024-10-21 RX ORDER — METHYLPHENIDATE HYDROCHLORIDE 36 MG/1
36 TABLET ORAL EVERY MORNING
Qty: 30 TABLET | Refills: 0 | Status: SHIPPED | OUTPATIENT
Start: 2024-11-20 | End: 2024-12-20

## 2024-10-21 RX ORDER — METHYLPHENIDATE HYDROCHLORIDE 5 MG/1
5 TABLET ORAL DAILY
Qty: 30 TABLET | Refills: 0 | Status: SHIPPED | OUTPATIENT
Start: 2024-10-21 | End: 2024-11-20

## 2024-10-21 RX ORDER — METHYLPHENIDATE HYDROCHLORIDE 5 MG/1
5 TABLET ORAL DAILY
Qty: 30 TABLET | Refills: 0 | Status: SHIPPED | OUTPATIENT
Start: 2024-11-20 | End: 2024-12-20

## 2024-10-21 RX ORDER — METHYLPHENIDATE HYDROCHLORIDE 5 MG/1
5 TABLET ORAL DAILY
Qty: 30 TABLET | Refills: 0 | Status: SHIPPED | OUTPATIENT
Start: 2024-12-20 | End: 2025-01-19

## 2024-10-21 RX ORDER — METHYLPHENIDATE HYDROCHLORIDE 36 MG/1
36 TABLET ORAL EVERY MORNING
Qty: 30 TABLET | Refills: 0 | Status: SHIPPED | OUTPATIENT
Start: 2024-12-20 | End: 2025-01-19

## 2024-10-21 NOTE — PATIENT INSTRUCTIONS
Nader Brink,    Thank you for allowing [facility] to manage your care.    I sent your prescriptions to your pharmacy.    If you have any questions or concerns, please feel free to call us at [phone]    Sincerely,    Cipriano Elena PA-C    Did you know?      You can schedule a video visit for follow-up appointments as well as future appointments for certain conditions.  Please see the below link.     [email or web address]    If you have not already done so,  I encourage you to sign up for [email or web address] ([email or web address]  This will allow you to review your results, securely communicate with a provider, and schedule virtual visits as well.

## 2024-10-21 NOTE — PROGRESS NOTES
"Keena is a 44 year old who is being evaluated via a billable video visit.    How would you like to obtain your AVS? MyChart  If the video visit is dropped, the invitation should be resent by: Text to cell phone: [phone]  Will anyone else be joining your video visit? No      Assessment & Plan   Problem List Items Addressed This Visit          Behavioral    ADHD (attention deficit hyperactivity disorder), inattentive type - Primary    Relevant Medications    methylphenidate HCl ER, OSM, (CONCERTA) 36 MG CR tablet    methylphenidate HCl ER, OSM, (CONCERTA) 36 MG CR tablet (Start on 11/20/2024)    methylphenidate HCl ER, OSM, (CONCERTA) 36 MG CR tablet (Start on 12/20/2024)    methylphenidate (RITALIN) 5 MG tablet    methylphenidate (RITALIN) 5 MG tablet (Start on 11/20/2024)    methylphenidate (RITALIN) 5 MG tablet (Start on 12/20/2024)      This is a very pleasant 44-year-old female here for a refill of her Concerta and Ritalin prescriptions which she ran out of yesterday.  [place] prescriber database was reviewed and I have no concerns.  Her only adverse effect is occasional dry mouth.  Otherwise she is tolerating the medication well.  3-month refill sent to her pharmacy and she agreed to follow-up with her primary care provider for further refills.    Dx discussed with and explained to the pt to their satisfaction.  All questions were answered at this time. Pt expressed understanding of and agreement with this plan. Standard medication warnings given. I have given the patient a list of pertinent indications for re-evaluation. Will go to the Emergency Department if symptoms worsen or new concerning symptoms arise. Patient left the call in no apparent distress.      BMI  Estimated body mass index is 31.94 kg/m  as calculated from the following:    Height as of 5/7/24: 1.715 m (5' 7.5\").    Weight as of 5/7/24: 93.9 kg (207 lb).     See Patient Instructions      Subjective   Keena is a 44 year old, " presenting for the following health issues:  Recheck Medication        10/21/2024     8:25 AM   Additional Questions   Roomed by Alex Barth CMA   Accompanied by N/A         10/21/2024     8:25 AM   Patient Reported Additional Medications   Patient reports taking the following new medications No new medications     History of Present Illness       Reason for visit:  [facility] med check    She eats 4 or more servings of fruits and vegetables daily.She consumes 0 sweetened beverage(s) daily.She exercises with enough effort to increase her heart rate 10 to 19 minutes per day.  She exercises with enough effort to increase her heart rate 3 or less days per week.   She is taking medications regularly.     Doing well with 36mg of Concerta XR in am and 5mg of Ritalin in pm. Occasional dry mouth. Denies chest pain, sob, dizziness, palpitations and other symptoms.    Please answer the questions below, rating yourself on each of the criteria shown using the scale on the right side of the page. As you answer each question, place an X in the box that best describes how you have felt and conducted yourself over the past 6 months.     Never Rarely Sometimes Often Very Often   1 How often do you have trouble wrapping up the final details of a project once the challenging parts have been done?   x     2 How often do you have difficulty getting things in order when you have to do a task that requires organization?    x    3 How often do you have problems remembering appointments or obligations? x       4 When you have a task that requires a lot of thought, how often do you avoid or delay getting started? x       5 How often do you fidget or squirm with your hands or feet when you have to sit down for a long time?     x   6 How often do you feel overly active and compelled to do things, like you were driven by a motor?   x     7 How often do you make careless mistakes when you have to work on a boring or difficult project?  x      8  How often do you have difficulty keeping your attention when you are doing boring or repetitive work?    x    9 How often do you have difficulty concentrating on what people say to you, even when they are speaking to you directly?   x     10 How often do you misplace or have difficulty finding things at home or at work?   x     11 How often are you distracted by activity or noise around you?  x      12 How often do you leave your seat in meetings or other situations in which you are expected to remain seated? x       13 How often do you feel restless or fidgety?    x    14 How often do you have difficulty unwinding and relaxing when you have time to yourself?     x   15 How often do you find yourself talking too much when you are in social situations?   x     16 When you're in a conversation, how often do you find yourself finishing the sentences of the people you are talking to, before they can finish them themselves?   x     17 How often do you have difficulty waiting your turn in situations when turn-taking is required? x       18 How often do you interrupt others when they are busy?   x           Review of Systems  Constitutional, HEENT, cardiovascular, pulmonary, gi and gu systems are negative, except as otherwise noted.      Objective           Vitals:  No vitals were obtained today due to virtual visit.    Physical Exam   GENERAL: alert and no distress  EYES: Eyes grossly normal to inspection.  No discharge or erythema, or obvious scleral/conjunctival abnormalities.  RESP: No audible wheeze, cough, or visible cyanosis.    SKIN: Visible skin clear. No significant rash, abnormal pigmentation or lesions.  NEURO: Cranial nerves grossly intact.  Mentation and speech appropriate for age.  PSYCH: Appropriate affect, tone, and pace of words      Video-Visit Details    Type of service:  Video Visit   Originating Location (pt. Location): Home  Distant Location (provider location):  On-site  Platform used for Video  Visit: Garcia  Signed Electronically by: LEISA Bond

## 2024-10-22 RX ORDER — METHYLPHENIDATE HYDROCHLORIDE 36 MG/1
36 TABLET ORAL DAILY
Qty: 30 TABLET | Refills: 0 | Status: SHIPPED | OUTPATIENT
Start: 2024-10-22

## 2024-11-10 ENCOUNTER — HEALTH MAINTENANCE LETTER (OUTPATIENT)
Age: 44
End: 2024-11-10

## 2025-01-15 ENCOUNTER — PATIENT OUTREACH (OUTPATIENT)
Dept: CARE COORDINATION | Facility: CLINIC | Age: 45
End: 2025-01-15
Payer: COMMERCIAL

## 2025-01-23 ENCOUNTER — MYC REFILL (OUTPATIENT)
Dept: FAMILY MEDICINE | Facility: CLINIC | Age: 45
End: 2025-01-23
Payer: COMMERCIAL

## 2025-01-23 DIAGNOSIS — F90.9 ATTENTION DEFICIT HYPERACTIVITY DISORDER (ADHD), UNSPECIFIED ADHD TYPE: ICD-10-CM

## 2025-01-23 RX ORDER — METHYLPHENIDATE HYDROCHLORIDE 36 MG/1
36 TABLET ORAL DAILY
Qty: 30 TABLET | Refills: 0 | Status: SHIPPED | OUTPATIENT
Start: 2025-02-22 | End: 2025-03-24

## 2025-01-23 RX ORDER — METHYLPHENIDATE HYDROCHLORIDE 36 MG/1
36 TABLET ORAL DAILY
Qty: 30 TABLET | Refills: 0 | Status: CANCELLED | OUTPATIENT
Start: 2025-01-23

## 2025-01-23 RX ORDER — METHYLPHENIDATE HYDROCHLORIDE 36 MG/1
36 TABLET ORAL DAILY
Qty: 30 TABLET | Refills: 0 | Status: SHIPPED | OUTPATIENT
Start: 2025-01-23 | End: 2025-02-22

## 2025-01-23 RX ORDER — METHYLPHENIDATE HYDROCHLORIDE 36 MG/1
36 TABLET ORAL DAILY
Qty: 30 TABLET | Refills: 0 | Status: SHIPPED | OUTPATIENT
Start: 2025-03-24 | End: 2025-04-23

## 2025-02-10 ENCOUNTER — VIRTUAL VISIT (OUTPATIENT)
Dept: FAMILY MEDICINE | Facility: CLINIC | Age: 45
End: 2025-02-10
Payer: COMMERCIAL

## 2025-02-10 DIAGNOSIS — F33.1 MAJOR DEPRESSIVE DISORDER, RECURRENT EPISODE, MODERATE (H): ICD-10-CM

## 2025-02-10 DIAGNOSIS — Z12.11 SCREEN FOR COLON CANCER: Primary | ICD-10-CM

## 2025-02-10 DIAGNOSIS — E89.0 POSTOPERATIVE HYPOTHYROIDISM: ICD-10-CM

## 2025-02-10 DIAGNOSIS — F90.0 ADHD (ATTENTION DEFICIT HYPERACTIVITY DISORDER), INATTENTIVE TYPE: ICD-10-CM

## 2025-02-10 PROCEDURE — 98006 SYNCH AUDIO-VIDEO EST MOD 30: CPT | Performed by: FAMILY MEDICINE

## 2025-02-10 PROCEDURE — 96127 BRIEF EMOTIONAL/BEHAV ASSMT: CPT | Mod: 95 | Performed by: FAMILY MEDICINE

## 2025-02-10 RX ORDER — METHYLPHENIDATE HYDROCHLORIDE 36 MG/1
36 TABLET ORAL DAILY
Qty: 30 TABLET | Refills: 0 | Status: SHIPPED | OUTPATIENT
Start: 2025-05-12 | End: 2025-06-11

## 2025-02-10 RX ORDER — LEVOTHYROXINE SODIUM 150 UG/1
150 TABLET ORAL DAILY
Status: CANCELLED | OUTPATIENT
Start: 2025-02-10

## 2025-02-10 RX ORDER — METHYLPHENIDATE HYDROCHLORIDE 5 MG/1
5 TABLET ORAL DAILY
Qty: 30 TABLET | Refills: 0 | Status: SHIPPED | OUTPATIENT
Start: 2025-04-11 | End: 2025-05-11

## 2025-02-10 RX ORDER — METHYLPHENIDATE HYDROCHLORIDE 5 MG/1
5 TABLET ORAL DAILY
Qty: 30 TABLET | Refills: 0 | Status: SHIPPED | OUTPATIENT
Start: 2025-02-10 | End: 2025-03-12

## 2025-02-10 RX ORDER — METHYLPHENIDATE HYDROCHLORIDE 36 MG/1
36 TABLET ORAL DAILY
Qty: 30 TABLET | Refills: 0 | Status: SHIPPED | OUTPATIENT
Start: 2025-04-21 | End: 2025-05-21

## 2025-02-10 RX ORDER — METHYLPHENIDATE HYDROCHLORIDE 36 MG/1
36 TABLET ORAL DAILY
Qty: 30 TABLET | Refills: 0 | Status: SHIPPED | OUTPATIENT
Start: 2025-04-11 | End: 2025-05-11

## 2025-02-10 RX ORDER — METHYLPHENIDATE HYDROCHLORIDE 5 MG/1
5 TABLET ORAL DAILY
Qty: 30 TABLET | Refills: 0 | Status: SHIPPED | OUTPATIENT
Start: 2025-03-12 | End: 2025-04-11

## 2025-02-10 ASSESSMENT — ANXIETY QUESTIONNAIRES
3. WORRYING TOO MUCH ABOUT DIFFERENT THINGS: SEVERAL DAYS
GAD7 TOTAL SCORE: 7
4. TROUBLE RELAXING: MORE THAN HALF THE DAYS
GAD7 TOTAL SCORE: 7
7. FEELING AFRAID AS IF SOMETHING AWFUL MIGHT HAPPEN: NOT AT ALL
2. NOT BEING ABLE TO STOP OR CONTROL WORRYING: SEVERAL DAYS
7. FEELING AFRAID AS IF SOMETHING AWFUL MIGHT HAPPEN: NOT AT ALL
GAD7 TOTAL SCORE: 7
6. BECOMING EASILY ANNOYED OR IRRITABLE: SEVERAL DAYS
5. BEING SO RESTLESS THAT IT IS HARD TO SIT STILL: SEVERAL DAYS
1. FEELING NERVOUS, ANXIOUS, OR ON EDGE: SEVERAL DAYS

## 2025-02-10 ASSESSMENT — PATIENT HEALTH QUESTIONNAIRE - PHQ9
SUM OF ALL RESPONSES TO PHQ QUESTIONS 1-9: 9
10. IF YOU CHECKED OFF ANY PROBLEMS, HOW DIFFICULT HAVE THESE PROBLEMS MADE IT FOR YOU TO DO YOUR WORK, TAKE CARE OF THINGS AT HOME, OR GET ALONG WITH OTHER PEOPLE: VERY DIFFICULT
SUM OF ALL RESPONSES TO PHQ QUESTIONS 1-9: 9

## 2025-02-10 NOTE — PROGRESS NOTES
"Keena is a 45 year old who is being evaluated via a billable video visit.    How would you like to obtain your AVS? MyChart  If the video visit is dropped, the invitation should be resent by: Text to cell phone: [phone]  Will anyone else be joining your video visit? No      Assessment & Plan     Screen for colon cancer    - Colonoscopy Screening  Referral; Future    Major depressive disorder, recurrent episode, moderate (H)  Worsening but will monitor.  She is on wellbutrin 300 mg daily.  Maybe restart zoloft if needed     ADHD (attention deficit hyperactivity disorder), inattentive type  The current medical regimen is effective;  continue present plan and medications.    - methylphenidate HCl ER, OSM, (CONCERTA) 36 MG CR tablet; Take 1 tablet (36 mg) by mouth daily.  - methylphenidate HCl ER, OSM, (CONCERTA) 36 MG CR tablet; Take 1 tablet (36 mg) by mouth daily.  - methylphenidate HCl ER, OSM, (CONCERTA) 36 MG CR tablet; Take 1 tablet (36 mg) by mouth daily.  - methylphenidate (RITALIN) 5 MG tablet; Take 1 tablet (5 mg) by mouth daily.  - methylphenidate (RITALIN) 5 MG tablet; Take 1 tablet (5 mg) by mouth daily.  - methylphenidate (RITALIN) 5 MG tablet; Take 1 tablet (5 mg) by mouth daily.    Postoperative hypothyroidism    - TSH; Future  - T4, free; Future          BMI  Estimated body mass index is 31.94 kg/m  as calculated from the following:    Height as of 5/7/24: 1.715 m (5' 7.5\").    Weight as of 5/7/24: 93.9 kg (207 lb).         Follow up in 3 months for CPE    Subjective   Keena is a 45 year old, presenting for the following health issues:  A.D.H.D and Chronic Disease Management        2/10/2025     2:07 PM   Additional Questions   Roomed by Delmy LAMAS   Accompanied by self         2/10/2025     2:07 PM   Patient Reported Additional Medications   Patient reports taking the following new medications none     History of Present Illness       Hypothyroidism:     Since last visit, patient " describes the following symptoms::  Fatigue and Weight gain    Weight gain::  16-20 lbs.    Reason for visit:  ADHD med check, request for Free T4 AND TSH lab order (both tests, not just TSH with reflex to Free T4).    She eats 4 or more servings of fruits and vegetables daily.She consumes 0 sweetened beverage(s) daily.She exercises with enough effort to increase her heart rate 9 or less minutes per day.  She exercises with enough effort to increase her heart rate 3 or less days per week.   She is taking medications regularly.    The patient has a history of Graves' disease and was referred to a new endocrinologist about a month ago because her endocrinologist stopped excepting insurance.  She has an appointment in about 6 months with endocrinology.  She currently takes levothyroxine 150 mcg daily    Medication Followup of Concerta  Taking Medication as prescribed: yes  Side Effects:  None  Medication Helping Symptoms:  yes  Concerta 36 mg daily    Depression and Anxiety   How are you doing with your depression since your last visit? Worsened   How are you doing with your anxiety since your last visit?  Worsened   Are you having other symptoms that might be associated with depression or anxiety? No  Have you had a significant life event? Health Concerns - just due to thyroid issues - not scheduled to see Endo until August   Do you have any concerns with your use of alcohol or other drugs? No  Wellbutrin 300 mg daily  She was on zoloft in the past     Social History     Tobacco Use    Smoking status: Former     Current packs/day: 0.00     Types: Cigarettes     Passive exposure: Never    Smokeless tobacco: Never    Tobacco comments:     smoked from ages [age]   Vaping Use    Vaping status: Never Used   Substance Use Topics    Alcohol use: No     Comment: none due to being overeaters annonymous    Drug use: No         5/7/2024     8:00 AM 10/20/2024     7:27 AM 2/10/2025     8:26 AM   PHQ   PHQ-9 Total Score 11 6 9     Q9: Thoughts of better off dead/self-harm past 2 weeks Not at all Not at all Not at all       Patient-reported         4/22/2024     4:47 PM 5/7/2024     8:01 AM 2/10/2025     8:27 AM   DAYANARA-7 SCORE   Total Score 12 (moderate anxiety) 15 (severe anxiety) 7 (mild anxiety)   Total Score 12 15 7        Patient-reported         2/10/2025     8:26 AM   Last PHQ-9   1.  Little interest or pleasure in doing things 1   2.  Feeling down, depressed, or hopeless 0   3.  Trouble falling or staying asleep, or sleeping too much 2   4.  Feeling tired or having little energy 2   5.  Poor appetite or overeating 2   6.  Feeling bad about yourself 0   7.  Trouble concentrating 2   8.  Moving slowly or restless 0   Q9: Thoughts of better off dead/self-harm past 2 weeks 0   PHQ-9 Total Score 9        Patient-reported         2/10/2025     8:27 AM   DAYANARA-7    1. Feeling nervous, anxious, or on edge 1   2. Not being able to stop or control worrying 1   3. Worrying too much about different things 1   4. Trouble relaxing 2   5. Being so restless that it is hard to sit still 1   6. Becoming easily annoyed or irritable 1   7. Feeling afraid, as if something awful might happen 0   DAYANARA-7 Total Score 7        Patient-reported       Suicide Assessment Five-step Evaluation and Treatment (SAFE-T)            Review of Systems  Constitutional, HEENT, cardiovascular, pulmonary, gi and gu systems are negative, except as otherwise noted.      Objective           Vitals:  No vitals were obtained today due to virtual visit.    Physical Exam   GENERAL: alert and no distress  EYES: Eyes grossly normal to inspection.  No discharge or erythema, or obvious scleral/conjunctival abnormalities.  RESP: No audible wheeze, cough, or visible cyanosis.    SKIN: Visible skin clear. No significant rash, abnormal pigmentation or lesions.  NEURO: Cranial nerves grossly intact.  Mentation and speech appropriate for age.  PSYCH: Appropriate affect, tone, and pace of  words          Video-Visit Details    Type of service:  Video Visit   Originating Location (pt. Location): Home    Distant Location (provider location):  On-site  Platform used for Video Visit: Garcia  Signed Electronically by: Silvana Patton DO

## 2025-02-11 ENCOUNTER — LAB (OUTPATIENT)
Dept: LAB | Facility: HOSPITAL | Age: 45
End: 2025-02-11
Payer: COMMERCIAL

## 2025-02-11 DIAGNOSIS — E89.0 POSTOPERATIVE HYPOTHYROIDISM: ICD-10-CM

## 2025-02-11 LAB
T4 FREE SERPL-MCNC: 1.51 NG/DL (ref 0.9–1.7)
TSH SERPL DL<=0.005 MIU/L-ACNC: 0.81 UIU/ML (ref 0.3–4.2)

## 2025-02-11 PROCEDURE — 84439 ASSAY OF FREE THYROXINE: CPT

## 2025-02-11 PROCEDURE — 84443 ASSAY THYROID STIM HORMONE: CPT

## 2025-02-11 PROCEDURE — 36415 COLL VENOUS BLD VENIPUNCTURE: CPT

## 2025-02-26 ENCOUNTER — MYC MEDICAL ADVICE (OUTPATIENT)
Dept: FAMILY MEDICINE | Facility: CLINIC | Age: 45
End: 2025-02-26
Payer: COMMERCIAL

## 2025-02-26 DIAGNOSIS — E89.0 POSTOPERATIVE HYPOTHYROIDISM: Primary | ICD-10-CM

## 2025-02-26 NOTE — TELEPHONE ENCOUNTER
I can refill levothyroxine can you pend the pharmacy?  [facility] were [facility] in [facility] [place] in [facility] system    Silvana Patton D.O.

## 2025-02-26 NOTE — TELEPHONE ENCOUNTER
Routing to PCP    Patient had appt on 2/10 and had thyroid levels tested. Did you discuss managing this med for her?    JAVED Melgoza  [place] Triage RN  [facility]

## 2025-03-04 ENCOUNTER — VIRTUAL VISIT (OUTPATIENT)
Dept: FAMILY MEDICINE | Facility: CLINIC | Age: 45
End: 2025-03-04
Payer: COMMERCIAL

## 2025-03-04 DIAGNOSIS — F33.1 MODERATE EPISODE OF RECURRENT MAJOR DEPRESSIVE DISORDER (H): Primary | ICD-10-CM

## 2025-03-04 PROCEDURE — 98006 SYNCH AUDIO-VIDEO EST MOD 30: CPT | Performed by: FAMILY MEDICINE

## 2025-03-04 RX ORDER — LEVOTHYROXINE SODIUM 150 UG/1
150 TABLET ORAL DAILY
Qty: 90 TABLET | Refills: 3 | Status: SHIPPED | OUTPATIENT
Start: 2025-03-04

## 2025-03-04 ASSESSMENT — ANXIETY QUESTIONNAIRES
GAD7 TOTAL SCORE: 9
3. WORRYING TOO MUCH ABOUT DIFFERENT THINGS: SEVERAL DAYS
8. IF YOU CHECKED OFF ANY PROBLEMS, HOW DIFFICULT HAVE THESE MADE IT FOR YOU TO DO YOUR WORK, TAKE CARE OF THINGS AT HOME, OR GET ALONG WITH OTHER PEOPLE?: SOMEWHAT DIFFICULT
IF YOU CHECKED OFF ANY PROBLEMS ON THIS QUESTIONNAIRE, HOW DIFFICULT HAVE THESE PROBLEMS MADE IT FOR YOU TO DO YOUR WORK, TAKE CARE OF THINGS AT HOME, OR GET ALONG WITH OTHER PEOPLE: SOMEWHAT DIFFICULT
GAD7 TOTAL SCORE: 9
GAD7 TOTAL SCORE: 9
7. FEELING AFRAID AS IF SOMETHING AWFUL MIGHT HAPPEN: SEVERAL DAYS
5. BEING SO RESTLESS THAT IT IS HARD TO SIT STILL: MORE THAN HALF THE DAYS
4. TROUBLE RELAXING: MORE THAN HALF THE DAYS
2. NOT BEING ABLE TO STOP OR CONTROL WORRYING: SEVERAL DAYS
1. FEELING NERVOUS, ANXIOUS, OR ON EDGE: SEVERAL DAYS
6. BECOMING EASILY ANNOYED OR IRRITABLE: SEVERAL DAYS
7. FEELING AFRAID AS IF SOMETHING AWFUL MIGHT HAPPEN: SEVERAL DAYS

## 2025-03-04 ASSESSMENT — PATIENT HEALTH QUESTIONNAIRE - PHQ9
SUM OF ALL RESPONSES TO PHQ QUESTIONS 1-9: 7
10. IF YOU CHECKED OFF ANY PROBLEMS, HOW DIFFICULT HAVE THESE PROBLEMS MADE IT FOR YOU TO DO YOUR WORK, TAKE CARE OF THINGS AT HOME, OR GET ALONG WITH OTHER PEOPLE: SOMEWHAT DIFFICULT
SUM OF ALL RESPONSES TO PHQ QUESTIONS 1-9: 7

## 2025-03-04 ASSESSMENT — ENCOUNTER SYMPTOMS: NERVOUS/ANXIOUS: 1

## 2025-03-04 NOTE — TELEPHONE ENCOUNTER
Routing My Chart message to Dr. Patton.    Pharmacy pended.    Bronson Salamanca, RN, BSN, PHN  [facility]

## 2025-03-04 NOTE — PROGRESS NOTES
Keena is a 45 year old who is being evaluated via a billable video visit.    How would you like to obtain your AVS? [place]  If the video visit is dropped, the invitation should be resent by: Text to cell phone: [phone]  Will anyone else be joining your video visit? No      Assessment & Plan     Moderate episode of recurrent major depressive disorder (H)    Will add sertraline 50 mg daily to her Wellbutrin 300 mg daily and recheck virtually in a month.  It is likely we will need to ramp it up to 100 mg.  She is due for in person office visit in May she will schedule this also    - sertraline (ZOLOFT) 50 MG tablet; Take 1 tablet (50 mg) by mouth daily.              Subjective   Keena is a 45 year old, presenting for the following health issues:  Depression and Anxiety        3/4/2025    10:59 AM   Additional Questions   Roomed by self check in [facility]     Anxiety    History of Present Illness       Mental Health Follow-up:  Patient presents to follow-up on Depression.Patient's depression since last visit has been:  Worse  The patient is not having other symptoms associated with depression.      Any significant life events: No  Patient is not feeling anxious or having panic attacks.  Patient has no concerns about alcohol or drug use.       The patient is on Wellbutrin 300 mg daily, Concerta 36 mg extended release daily and Ritalin 5 mg immediate release daily as needed    She has been on Zoloft in the past for depression also.    Social History     Tobacco Use    Smoking status: Former     Current packs/day: 0.00     Types: Cigarettes     Passive exposure: Never    Smokeless tobacco: Never    Tobacco comments:     smoked from ages [age]   Vaping Use    Vaping status: Never Used   Substance Use Topics    Alcohol use: No     Comment: none due to being overeaters annonymous    Drug use: No         10/20/2024     7:27 AM 2/10/2025     8:26 AM 3/4/2025    10:56 AM   PHQ   PHQ-9 Total Score 6 9  7    Q9:  [place] case from Tuesday. He will need to see one of the [facility] cardiologists for shared decision making. Omer Burgess or Hoang  Thanks, MB Thoughts of better off dead/self-harm past 2 weeks Not at all Not at all Not at all       Patient-reported         5/7/2024     8:01 AM 2/10/2025     8:27 AM 3/4/2025    10:56 AM   DAYANARA-7 SCORE   Total Score 15 (severe anxiety) 7 (mild anxiety) 9 (mild anxiety)   Total Score 15 7  9        Patient-reported         3/4/2025    10:56 AM   Last PHQ-9   1.  Little interest or pleasure in doing things 1   2.  Feeling down, depressed, or hopeless 1   3.  Trouble falling or staying asleep, or sleeping too much 0   4.  Feeling tired or having little energy 1   5.  Poor appetite or overeating 2   6.  Feeling bad about yourself 1   7.  Trouble concentrating 1   8.  Moving slowly or restless 0   Q9: Thoughts of better off dead/self-harm past 2 weeks 0   PHQ-9 Total Score 7        Patient-reported         3/4/2025    10:56 AM   DAYANARA-7    1. Feeling nervous, anxious, or on edge 1   2. Not being able to stop or control worrying 1   3. Worrying too much about different things 1   4. Trouble relaxing 2   5. Being so restless that it is hard to sit still 2   6. Becoming easily annoyed or irritable 1   7. Feeling afraid, as if something awful might happen 1   DAYANARA-7 Total Score 9    If you checked any problems, how difficult have they made it for you to do your work, take care of things at home, or get along with other people? Somewhat difficult       Patient-reported       Suicide Assessment Five-step Evaluation and Treatment (SAFE-T)          Review of Systems  Constitutional, HEENT, cardiovascular, pulmonary, gi and gu systems are negative, except as otherwise noted.      Objective           Vitals:  No vitals were obtained today due to virtual visit.    Physical Exam   GENERAL: alert and no distress  EYES: Eyes grossly normal to inspection.  No discharge or erythema, or obvious scleral/conjunctival abnormalities.  RESP: No audible wheeze, cough, or visible cyanosis.    SKIN: Visible skin clear. No significant rash, abnormal  pigmentation or lesions.  NEURO: Cranial nerves grossly intact.  Mentation and speech appropriate for age.  PSYCH: Appropriate affect, tone, and pace of words          Video-Visit Details    Type of service:  Video Visit   Originating Location (pt. Location): Home    Distant Location (provider location):  On-site  Platform used for Video Visit: Garcia  Signed Electronically by: Silvana Patton DO

## 2025-03-20 ENCOUNTER — OFFICE VISIT (OUTPATIENT)
Dept: FAMILY MEDICINE | Facility: CLINIC | Age: 45
End: 2025-03-20
Payer: COMMERCIAL

## 2025-03-20 ENCOUNTER — ANCILLARY PROCEDURE (OUTPATIENT)
Dept: GENERAL RADIOLOGY | Facility: CLINIC | Age: 45
End: 2025-03-20
Attending: NURSE PRACTITIONER
Payer: COMMERCIAL

## 2025-03-20 VITALS
SYSTOLIC BLOOD PRESSURE: 107 MMHG | RESPIRATION RATE: 16 BRPM | DIASTOLIC BLOOD PRESSURE: 75 MMHG | BODY MASS INDEX: 31.94 KG/M2 | HEIGHT: 68 IN | HEART RATE: 105 BPM | OXYGEN SATURATION: 97 % | TEMPERATURE: 98.3 F

## 2025-03-20 DIAGNOSIS — J20.9 ACUTE BRONCHITIS WITH SYMPTOMS > 10 DAYS: Primary | ICD-10-CM

## 2025-03-20 DIAGNOSIS — J98.9 RESPIRATORY ILLNESS: ICD-10-CM

## 2025-03-20 DIAGNOSIS — J20.9 ACUTE BRONCHITIS WITH SYMPTOMS > 10 DAYS: ICD-10-CM

## 2025-03-20 RX ORDER — PREDNISONE 20 MG/1
20 TABLET ORAL 2 TIMES DAILY
Qty: 10 TABLET | Refills: 0 | Status: SHIPPED | OUTPATIENT
Start: 2025-03-20 | End: 2025-03-25

## 2025-03-20 RX ORDER — BENZONATATE 100 MG/1
100 CAPSULE ORAL 3 TIMES DAILY PRN
Qty: 30 CAPSULE | Refills: 0 | Status: SHIPPED | OUTPATIENT
Start: 2025-03-20

## 2025-03-20 RX ORDER — AZITHROMYCIN 250 MG/1
TABLET, FILM COATED ORAL
Qty: 6 TABLET | Refills: 0 | Status: SHIPPED | OUTPATIENT
Start: 2025-03-20 | End: 2025-03-25

## 2025-03-20 ASSESSMENT — PAIN SCALES - GENERAL: PAINLEVEL_OUTOF10: MILD PAIN (2)

## 2025-03-20 NOTE — PROGRESS NOTES
"  Assessment & Plan     Acute bronchitis with symptoms > 10 days  Respiratory illness  Onset: 3/2/15- progressively getting worse. Suspect progression to bronchitis and given the fatigue/chills possible pneumonia. Will get CXR r/o pneumonia.  - XR Chest 2 Views  - predniSONE (DELTASONE) 20 MG tablet  Dispense: 10 tablet; Refill: 0  - azithromycin (ZITHROMAX) 250 MG tablet  Dispense: 6 tablet; Refill: 0  - benzonatate (TESSALON) 100 MG capsule  Dispense: 30 capsule; Refill: 0  If negative CXR - discussed post-viral cough can sometimes last 3-6 weeks. Discussed ongoing supportive cares.  If persistent symptoms despite treatment as above, RTC.          Subjective   Keena is a 45 year old, presenting for the following health issues:  URI and Recheck Medication (Pt reports that she's here to discuss having symptoms of fever, cough that's productive, chills, night sweats , sore throat, fatigued since 3/2/25.)        3/20/2025     1:50 PM   Additional Questions   Roomed by dada   Accompanied by          3/20/2025     1:50 PM   Patient Reported Additional Medications   Patient reports taking the following new medications none     Taking coricidin and Nyquil which helps    History of Present Illness       Reason for visit:  Cough,fatigue, etc.  Symptom onset:  3-4 weeks ago  Symptoms include:  Cough,fatigue, sore throat, fever, sweats, chills, nightsweats  Symptom intensity:  Severe  Symptom progression:  Worsening  Had these symptoms before:  Yes  Has tried/received treatment for these symptoms:  No  What makes it worse:  Doing anything  What makes it better:  Resting   She is taking medications regularly.                      Objective    /75 (BP Location: Left arm, Patient Position: Sitting, Cuff Size: Adult Large)   Pulse 105   Temp 98.3  F (36.8  C) (Tympanic)   Resp 16   Ht 1.715 m (5' 7.5\")   LMP  (LMP Unknown)   SpO2 97%   Breastfeeding No   BMI 31.94 kg/m    Body mass index is 31.94 " kg/m .  Physical Exam   GENERAL: alert and no distress  NECK: no adenopathy, no asymmetry, masses, or scars  RESP: lungs clear/decreased to auscultation - no rales, rhonchi or wheezes  CV: regular rate and rhythm, normal S1 S2, no S3 or S4, no murmur, click or rub, no peripheral edema  ABDOMEN: soft, nontender, no hepatosplenomegaly, no masses and bowel sounds normal  MS: no gross musculoskeletal defects noted, no edema            Signed Electronically by: YO Hansen CNP

## 2025-04-07 ENCOUNTER — VIRTUAL VISIT (OUTPATIENT)
Dept: FAMILY MEDICINE | Facility: CLINIC | Age: 45
End: 2025-04-07
Payer: COMMERCIAL

## 2025-04-07 DIAGNOSIS — F41.1 GAD (GENERALIZED ANXIETY DISORDER): Primary | ICD-10-CM

## 2025-04-07 DIAGNOSIS — F33.1 MODERATE EPISODE OF RECURRENT MAJOR DEPRESSIVE DISORDER (H): ICD-10-CM

## 2025-04-07 PROCEDURE — 98006 SYNCH AUDIO-VIDEO EST MOD 30: CPT | Performed by: FAMILY MEDICINE

## 2025-04-07 RX ORDER — ESCITALOPRAM OXALATE 5 MG/1
5 TABLET ORAL DAILY
Qty: 60 TABLET | Refills: 0 | Status: SHIPPED | OUTPATIENT
Start: 2025-04-07

## 2025-04-07 ASSESSMENT — ANXIETY QUESTIONNAIRES
6. BECOMING EASILY ANNOYED OR IRRITABLE: SEVERAL DAYS
1. FEELING NERVOUS, ANXIOUS, OR ON EDGE: SEVERAL DAYS
IF YOU CHECKED OFF ANY PROBLEMS ON THIS QUESTIONNAIRE, HOW DIFFICULT HAVE THESE PROBLEMS MADE IT FOR YOU TO DO YOUR WORK, TAKE CARE OF THINGS AT HOME, OR GET ALONG WITH OTHER PEOPLE: NOT DIFFICULT AT ALL
5. BEING SO RESTLESS THAT IT IS HARD TO SIT STILL: SEVERAL DAYS
GAD7 TOTAL SCORE: 6
7. FEELING AFRAID AS IF SOMETHING AWFUL MIGHT HAPPEN: NOT AT ALL
8. IF YOU CHECKED OFF ANY PROBLEMS, HOW DIFFICULT HAVE THESE MADE IT FOR YOU TO DO YOUR WORK, TAKE CARE OF THINGS AT HOME, OR GET ALONG WITH OTHER PEOPLE?: NOT DIFFICULT AT ALL
GAD7 TOTAL SCORE: 6
GAD7 TOTAL SCORE: 6
3. WORRYING TOO MUCH ABOUT DIFFERENT THINGS: SEVERAL DAYS
7. FEELING AFRAID AS IF SOMETHING AWFUL MIGHT HAPPEN: NOT AT ALL
4. TROUBLE RELAXING: SEVERAL DAYS
2. NOT BEING ABLE TO STOP OR CONTROL WORRYING: SEVERAL DAYS

## 2025-04-07 ASSESSMENT — ENCOUNTER SYMPTOMS: NERVOUS/ANXIOUS: 1

## 2025-04-07 NOTE — PROGRESS NOTES
Keena is a 45 year old who is being evaluated via a billable video visit.    How would you like to obtain your AVS? [place]  If the video visit is dropped, the invitation should be resent by: Text to cell phone: [phone]  Will anyone else be joining your video visit? No      Assessment & Plan     ICD-10-CM    1. DAYANARA (generalized anxiety disorder)  F41.1 escitalopram (LEXAPRO) 5 MG tablet      2. Moderate episode of recurrent major depressive disorder (H)  F33.1 escitalopram (LEXAPRO) 5 MG tablet        Stop zoloft due to sexual side effects and trial lexapro instead.  Follow up in 2 months for mood         Subjective   Keena is a 45 year old, presenting for the following health issues:  Depression and Anxiety        4/7/2025     2:39 PM   Additional Questions   [place]     Anxiety    History of Present Illness       Mental Health Follow-up:  Patient presents to follow-up on Depression & Anxiety.Patient's depression since last visit has been:  Better  The patient is not having other symptoms associated with depression.  Patient's anxiety since last visit has been:  Medium  The patient is not having other symptoms associated with anxiety.  Any significant life events: No  Patient is feeling anxious or having panic attacks.  Patient has no concerns about alcohol or drug use.       Sertraline 50 mg was added to her wellbutrin 300 mg a month ago.      Social History     Tobacco Use    Smoking status: Former     Current packs/day: 0.00     Types: Cigarettes     Passive exposure: Never    Smokeless tobacco: Never    Tobacco comments:     smoked from ages [age]   Vaping Use    Vaping status: Never Used   Substance Use Topics    Alcohol use: No     Comment: none due to being overeaters annonymous    Drug use: No         2/10/2025     8:26 AM 3/4/2025    10:56 AM 4/7/2025     2:36 PM   PHQ   PHQ-9 Total Score 9  7  8    Q9: Thoughts of better off dead/self-harm past 2 weeks Not at all Not  at all Not at all       Patient-reported         2/10/2025     8:27 AM 3/4/2025    10:56 AM 4/7/2025     2:37 PM   DAYANARA-7 SCORE   Total Score 7 (mild anxiety) 9 (mild anxiety) 6 (mild anxiety)   Total Score 7  9  6        Patient-reported         4/7/2025     2:36 PM   Last PHQ-9   1.  Little interest or pleasure in doing things 1   2.  Feeling down, depressed, or hopeless 1   3.  Trouble falling or staying asleep, or sleeping too much 1   4.  Feeling tired or having little energy 2   5.  Poor appetite or overeating 2   6.  Feeling bad about yourself 0   7.  Trouble concentrating 1   8.  Moving slowly or restless 0   Q9: Thoughts of better off dead/self-harm past 2 weeks 0   PHQ-9 Total Score 8        Patient-reported         4/7/2025     2:37 PM   DAYANARA-7    1. Feeling nervous, anxious, or on edge 1   2. Not being able to stop or control worrying 1   3. Worrying too much about different things 1   4. Trouble relaxing 1   5. Being so restless that it is hard to sit still 1   6. Becoming easily annoyed or irritable 1   7. Feeling afraid, as if something awful might happen 0   DAYANARA-7 Total Score 6    If you checked any problems, how difficult have they made it for you to do your work, take care of things at home, or get along with other people? Not difficult at all       Patient-reported       Suicide Assessment Five-step Evaluation and Treatment (SAFE-T)          Review of Systems  Constitutional, HEENT, cardiovascular, pulmonary, gi and gu systems are negative, except as otherwise noted.      Objective           Vitals:  No vitals were obtained today due to virtual visit.    Physical Exam   GENERAL: alert and no distress  EYES: Eyes grossly normal to inspection.  No discharge or erythema, or obvious scleral/conjunctival abnormalities.  RESP: No audible wheeze, cough, or visible cyanosis.    SKIN: Visible skin clear. No significant rash, abnormal pigmentation or lesions.  NEURO: Cranial nerves grossly intact.  Mentation  and speech appropriate for age.  PSYCH: Appropriate affect, tone, and pace of words      Video-Visit Details    Type of service:  Video Visit   Originating Location (pt. Location): Home    Distant Location (provider location):  On-site  Platform used for Video Visit: Garcia  Signed Electronically by: Silvana Patton DO

## 2025-04-17 ENCOUNTER — TRANSFERRED RECORDS (OUTPATIENT)
Dept: ADMINISTRATIVE | Facility: CLINIC | Age: 45
End: 2025-04-17
Payer: COMMERCIAL

## 2025-04-22 NOTE — PROCEDURES
[facility]   [address] [place] [address], [address], [place] [address]     Patient Name: Keena Tobias  Gender:  Female  Exam Date: 04/17/2025 Visit Number:  [id number]  Age: 45 Years YOB: 1980  Attending MD: Dao Lovelace MD Medical Record#:  [id number]    Procedure: Colonoscopy   Indications: Colorectal cancer screening      Referring MD: Silvana Patton DO  Primary MD:      Silvana Patton DO  Medications: Admitting Medications:   0.9% Normal Saline at TKO   Intra Procedure Medications:   Patient received monitored anesthesia care.     Complications: No immediate complications  ______________________________________________________________________________  Procedure:   An examination of the heart and lungs was performed and found to be within acceptable limits.  .  The patient was therefore deemed a reasonable candidate for endoscopy and sedation.   The risks and benefits of the procedure were explained to the patient.After obtaining informed consent, the patient received monitored anesthesia care and I passed the scope   without difficulty via the rectum to the cecum.  The appendiceal orifice and ic valve were identified.  The scope was retroflexed during the examination  The quality of the prep was excellent  (Miralax/Gatorade/2 tablets Bisacodyl/Magnesium Citrate).    This was a complete examination throughout the entire colon.    Findings:    Polyp location: transverse colon.  Quantity: 1.  Size: 3 mm.  Polyp shape:  sessile.         Maneuver: polypectomy was performed with a cold biopsy forceps.       Removal:  complete.  Retrieval: complete.  Bleeding: none.  Anal canal:  internal hemorrhoid(s)    Impression:  Colorectal polyps  Hemorrhoids, internal    Preliminary Plan:  The patient and their physician will receive a copy of the pathology report as well as pathology-based recommendations for future screening or surveillance.  Return to your primary care  provider as needed.  Pathology Results:  A: COLON, TRANSVERSE, POLYP:           1. Normal colonic mucosa; a lymphoid aggregate is present           2. Negative for serrated change, dysplasia, and malignancy      MICROSCOPIC  A: Performed   SPECIAL STAINING/DEEPER  A: Deeper     Electronically signed by: Kodi Chamorro MD    Interpreted at [facility], [address] [place], [place] [address] [address]    Final Plan:  Repeat colonoscopy in 10 years for screening. If you have signs or symptoms of lower GI illness or a new diagnosis of colon cancer in an immediate family member, you should contact [facility] or your  primary provider to discuss whether your next exam should be repeated sooner.    We will attempt to contact you at appropriate intervals via [place] mail.  We may not be able to find you or contact you at that time, therefore you should know that the responsibility for following our recommendation rests with you.  If you don't hear from us at the time your procedure is due, please contact our office to schedule an appointment.  If your contact information should change, please contact our office so that we can update your record.      _Electronically signed by:___________________  Dao Lovelace MD                 04/17/2025    cc: Silvana Patton DO  cc: Silvana Patton DO

## 2025-05-02 ENCOUNTER — OFFICE VISIT (OUTPATIENT)
Dept: FAMILY MEDICINE | Facility: CLINIC | Age: 45
End: 2025-05-02
Payer: COMMERCIAL

## 2025-05-02 VITALS
WEIGHT: 207 LBS | HEART RATE: 84 BPM | BODY MASS INDEX: 31.37 KG/M2 | OXYGEN SATURATION: 99 % | HEIGHT: 68 IN | RESPIRATION RATE: 12 BRPM | DIASTOLIC BLOOD PRESSURE: 74 MMHG | SYSTOLIC BLOOD PRESSURE: 118 MMHG | TEMPERATURE: 98.2 F

## 2025-05-02 DIAGNOSIS — Z13.1 SCREENING FOR DIABETES MELLITUS: ICD-10-CM

## 2025-05-02 DIAGNOSIS — Z13.220 LIPID SCREENING: ICD-10-CM

## 2025-05-02 DIAGNOSIS — F90.0 ADHD (ATTENTION DEFICIT HYPERACTIVITY DISORDER), INATTENTIVE TYPE: ICD-10-CM

## 2025-05-02 DIAGNOSIS — E89.0 POSTOPERATIVE HYPOTHYROIDISM: ICD-10-CM

## 2025-05-02 DIAGNOSIS — Z00.00 ROUTINE GENERAL MEDICAL EXAMINATION AT A HEALTH CARE FACILITY: Primary | ICD-10-CM

## 2025-05-02 DIAGNOSIS — F33.1 MODERATE EPISODE OF RECURRENT MAJOR DEPRESSIVE DISORDER (H): ICD-10-CM

## 2025-05-02 PROCEDURE — 90715 TDAP VACCINE 7 YRS/> IM: CPT | Performed by: FAMILY MEDICINE

## 2025-05-02 PROCEDURE — 90471 IMMUNIZATION ADMIN: CPT | Performed by: FAMILY MEDICINE

## 2025-05-02 PROCEDURE — 90480 ADMN SARSCOV2 VAC 1/ONLY CMP: CPT | Performed by: FAMILY MEDICINE

## 2025-05-02 PROCEDURE — 3078F DIAST BP <80 MM HG: CPT | Performed by: FAMILY MEDICINE

## 2025-05-02 PROCEDURE — 99396 PREV VISIT EST AGE 40-64: CPT | Mod: 25 | Performed by: FAMILY MEDICINE

## 2025-05-02 PROCEDURE — 99214 OFFICE O/P EST MOD 30 MIN: CPT | Mod: 25 | Performed by: FAMILY MEDICINE

## 2025-05-02 PROCEDURE — G2211 COMPLEX E/M VISIT ADD ON: HCPCS | Performed by: FAMILY MEDICINE

## 2025-05-02 PROCEDURE — 1126F AMNT PAIN NOTED NONE PRSNT: CPT | Performed by: FAMILY MEDICINE

## 2025-05-02 PROCEDURE — 91320 SARSCV2 VAC 30MCG TRS-SUC IM: CPT | Performed by: FAMILY MEDICINE

## 2025-05-02 PROCEDURE — 3074F SYST BP LT 130 MM HG: CPT | Performed by: FAMILY MEDICINE

## 2025-05-02 PROCEDURE — 96127 BRIEF EMOTIONAL/BEHAV ASSMT: CPT | Performed by: FAMILY MEDICINE

## 2025-05-02 RX ORDER — ESCITALOPRAM OXALATE 10 MG/1
10 TABLET ORAL DAILY
Qty: 90 TABLET | Refills: 1 | Status: SHIPPED | OUTPATIENT
Start: 2025-05-02

## 2025-05-02 RX ORDER — BUPROPION HYDROCHLORIDE 300 MG/1
300 TABLET ORAL EVERY MORNING
Qty: 90 TABLET | Refills: 1 | Status: SHIPPED | OUTPATIENT
Start: 2025-05-02

## 2025-05-02 SDOH — HEALTH STABILITY: PHYSICAL HEALTH: ON AVERAGE, HOW MANY DAYS PER WEEK DO YOU ENGAGE IN MODERATE TO STRENUOUS EXERCISE (LIKE A BRISK WALK)?: 0 DAYS

## 2025-05-02 ASSESSMENT — ANXIETY QUESTIONNAIRES
4. TROUBLE RELAXING: NEARLY EVERY DAY
5. BEING SO RESTLESS THAT IT IS HARD TO SIT STILL: SEVERAL DAYS
GAD7 TOTAL SCORE: 8
IF YOU CHECKED OFF ANY PROBLEMS ON THIS QUESTIONNAIRE, HOW DIFFICULT HAVE THESE PROBLEMS MADE IT FOR YOU TO DO YOUR WORK, TAKE CARE OF THINGS AT HOME, OR GET ALONG WITH OTHER PEOPLE: SOMEWHAT DIFFICULT
8. IF YOU CHECKED OFF ANY PROBLEMS, HOW DIFFICULT HAVE THESE MADE IT FOR YOU TO DO YOUR WORK, TAKE CARE OF THINGS AT HOME, OR GET ALONG WITH OTHER PEOPLE?: SOMEWHAT DIFFICULT
GAD7 TOTAL SCORE: 8
2. NOT BEING ABLE TO STOP OR CONTROL WORRYING: SEVERAL DAYS
7. FEELING AFRAID AS IF SOMETHING AWFUL MIGHT HAPPEN: NOT AT ALL
6. BECOMING EASILY ANNOYED OR IRRITABLE: MORE THAN HALF THE DAYS
1. FEELING NERVOUS, ANXIOUS, OR ON EDGE: SEVERAL DAYS
3. WORRYING TOO MUCH ABOUT DIFFERENT THINGS: NOT AT ALL
GAD7 TOTAL SCORE: 8
7. FEELING AFRAID AS IF SOMETHING AWFUL MIGHT HAPPEN: NOT AT ALL

## 2025-05-02 ASSESSMENT — PATIENT HEALTH QUESTIONNAIRE - PHQ9
SUM OF ALL RESPONSES TO PHQ QUESTIONS 1-9: 10
SUM OF ALL RESPONSES TO PHQ QUESTIONS 1-9: 10
10. IF YOU CHECKED OFF ANY PROBLEMS, HOW DIFFICULT HAVE THESE PROBLEMS MADE IT FOR YOU TO DO YOUR WORK, TAKE CARE OF THINGS AT HOME, OR GET ALONG WITH OTHER PEOPLE: VERY DIFFICULT

## 2025-05-02 ASSESSMENT — SOCIAL DETERMINANTS OF HEALTH (SDOH): HOW OFTEN DO YOU GET TOGETHER WITH FRIENDS OR RELATIVES?: ONCE A WEEK

## 2025-05-02 ASSESSMENT — PAIN SCALES - GENERAL: PAINLEVEL_OUTOF10: NO PAIN (0)

## 2025-05-02 NOTE — PROGRESS NOTES
Preventive Care Visit  [facility]  Silvana Patton DO, Family Medicine  May 2, 2025      Assessment & Plan     Routine general medical examination at a health care facility      Moderate episode of recurrent major depressive disorder (H)  The current medical regimen is effective;  continue present plan and medications.    - buPROPion (WELLBUTRIN XL) 300 MG 24 hr tablet; Take 1 tablet (300 mg) by mouth every morning.  - escitalopram (LEXAPRO) 10 MG tablet; Take 1 tablet (10 mg) by mouth daily.    Postoperative hypothyroidism  The current medical regimen is effective;  continue present plan and medications.      ADHD (attention deficit hyperactivity disorder), inattentive type  The current medical regimen is effective;  continue present plan and medications.      Screening for diabetes mellitus    - Basic metabolic panel  (Ca, Cl, CO2, Creat, Gluc, K, Na, BUN); Future  - Hemoglobin A1c; Future    Lipid screening    - Lipid panel reflex to direct LDL Fasting; Future            Counseling  Appropriate preventive services were addressed with this patient via screening, questionnaire, or discussion as appropriate for fall prevention, nutrition, physical activity, Tobacco-use cessation, social engagement, weight loss and cognition.  Checklist reviewing preventive services available has been given to the patient.  Reviewed patient's diet, addressing concerns and/or questions.   She is at risk for psychosocial distress and has been provided with information to reduce risk.   The patient's PHQ-9 score is consistent with moderate depression. She was provided with information regarding depression.       Follow-up   Return in about 6 weeks (around 6/13/2025) for follow-up per plan recheck moods virtual visit okay.     Follow-up Visit   Expected date:  May 02, 2026 (Approximate)      Follow Up Appointment Details:     Follow-up with whom?: PCP    Follow-Up for what?: Adult Preventive    How?: In Person                  Murray Brink is a 45 year old, presenting for the following:  Physical        5/2/2025    12:49 PM   Additional Questions   Roomed by Delmy LAMAS   Accompanied by self         5/2/2025    12:49 PM   Patient Reported Additional Medications   Patient reports taking the following new medications none          HPI    Medication Followup of methylphenidate   Taking Medication as prescribed: yes  Side Effects:  None  Medication Helping Symptoms:  yes     Depression and Anxiety   How are you doing with your depression since your last visit? Worsened   How are you doing with your anxiety since your last visit?  Worsened   Are you having other symptoms that might be associated with depression or anxiety? No  Have you had a significant life event? No   Do you have any concerns with your use of alcohol or other drugs? No  Patient was switched from Zoloft to Lexapro because of sexual side effects about a month ago.  She has noticed less sexual side effects.  She also takes Wellbutrin 300 mg daily    Social History     Tobacco Use    Smoking status: Former     Current packs/day: 0.00     Types: Cigarettes     Passive exposure: Never    Smokeless tobacco: Never    Tobacco comments:     smoked from ages [age]   Vaping Use    Vaping status: Never Used   Substance Use Topics    Alcohol use: No     Comment: none due to being overeaters annonymous    Drug use: No         3/4/2025    10:56 AM 4/7/2025     2:36 PM 5/2/2025    12:47 PM   PHQ   PHQ-9 Total Score 7  8  10    Q9: Thoughts of better off dead/self-harm past 2 weeks Not at all Not at all Not at all       Patient-reported         3/4/2025    10:56 AM 4/7/2025     2:37 PM 5/2/2025    12:48 PM   DAYANARA-7 SCORE   Total Score 9 (mild anxiety) 6 (mild anxiety) 8 (mild anxiety)   Total Score 9  6  8        Patient-reported         5/2/2025    12:47 PM   Last PHQ-9   1.  Little interest or pleasure in doing things 2   2.  Feeling down, depressed, or hopeless 1   3.   Trouble falling or staying asleep, or sleeping too much 2   4.  Feeling tired or having little energy 1   5.  Poor appetite or overeating 1   6.  Feeling bad about yourself 0   7.  Trouble concentrating 3   8.  Moving slowly or restless 0   Q9: Thoughts of better off dead/self-harm past 2 weeks 0   PHQ-9 Total Score 10        Patient-reported         5/2/2025    12:48 PM   DAYANARA-7    1. Feeling nervous, anxious, or on edge 1   2. Not being able to stop or control worrying 1   3. Worrying too much about different things 0   4. Trouble relaxing 3   5. Being so restless that it is hard to sit still 1   6. Becoming easily annoyed or irritable 2   7. Feeling afraid, as if something awful might happen 0   DAYANARA-7 Total Score 8    If you checked any problems, how difficult have they made it for you to do your work, take care of things at home, or get along with other people? Somewhat difficult       Patient-reported       Suicide Assessment Five-step Evaluation and Treatment (SAFE-T)        Hypothyroidism Follow-up    Since last visit, patient describes the following symptoms: Weight stable, no hair loss, no skin changes, no constipation, no loose stools  TSH   Date Value Ref Range Status   02/11/2025 0.81 0.30 - 4.20 uIU/mL Final   09/15/2022 0.02 (L) 0.30 - 5.00 uIU/mL Final   04/26/2021 <0.01 (L) 0.40 - 4.00 mU/L Final     Levothyroxine 150 mg daily          Advance Care Planning    Discussed advance care planning with patient; informed AVS has link to [facility].        5/2/2025   General Health   How would you rate your overall physical health? Good   Feel stress (tense, anxious, or unable to sleep) To some extent   (!) STRESS CONCERN      5/2/2025   Nutrition   Three or more servings of calcium each day? Yes   Diet: Other   If other, please elaborate: low added sugar   How many servings of fruit and vegetables per day? 4 or more   How many sweetened beverages each day? 0-1         5/2/2025   Exercise   Days per  week of moderate/strenous exercise 0 days   (!) EXERCISE CONCERN      5/2/2025   Social Factors   Frequency of gathering with friends or relatives Once a week   Worry food won't last until get money to buy more No   Food not last or not have enough money for food? No   Do you have housing? (Housing is defined as stable permanent housing and does not include staying outside in a car, in a tent, in an abandoned building, in an overnight shelter, or couch-surfing.) Yes   Are you worried about losing your housing? No   Lack of transportation? No   Unable to get utilities (heat,electricity)? No         5/2/2025   Dental   Dentist two times every year? Yes       Today's PHQ-9 Score:       5/2/2025    12:47 PM   PHQ-9 SCORE   PHQ-9 Total Score MyChart 10 (Moderate depression)   PHQ-9 Total Score 10        Patient-reported         5/2/2025   Substance Use   Alcohol more than 3/day or more than 7/wk No   Do you use any other substances recreationally? No     Social History     Tobacco Use    Smoking status: Former     Current packs/day: 0.00     Types: Cigarettes     Passive exposure: Never    Smokeless tobacco: Never    Tobacco comments:     smoked from ages [age]   Vaping Use    Vaping status: Never Used   Substance Use Topics    Alcohol use: No     Comment: none due to being overeaters annonymous    Drug use: No           9/19/2023   LAST FHS-7 RESULTS   1st degree relative breast or ovarian cancer Yes   Any relative bilateral breast cancer No   Any male have breast cancer No   Any ONE woman have BOTH breast AND ovarian cancer No   Any woman with breast cancer before 50yrs No   2 or more relatives with breast AND/OR ovarian cancer Yes   2 or more relatives with breast AND/OR bowel cancer No        Mammogram Screening - Mammogram every 1-2 years updated in Health Maintenance based on mutual decision making        5/2/2025   STI Screening   New sexual partner(s) since last STI/HIV test? No     History of abnormal Pap smear:  "No - age 30- 64 PAP with HPV every 5 years recommended        Latest Ref Rng & Units 2/23/2022     9:23 AM 9/2/2016    10:36 AM 9/2/2016    12:00 AM   PAP / HPV   PAP  Negative for Intraepithelial Lesion or Malignancy (NILM)      PAP (Historical)    NIL    HPV 16 DNA Negative Negative  Negative     HPV 18 DNA Negative Negative  Negative     Other HR HPV Negative Negative  Negative       ASCVD Risk   The 10-year ASCVD risk score (Reshma BUCHANAN, et al., 2019) is: 0.5%    Values used to calculate the score:      Age: 45 years      Sex: Female      Is Non- : No      Diabetic: No      Tobacco smoker: No      Systolic Blood Pressure: 118 mmHg      Is BP treated: No      HDL Cholesterol: 75 mg/dL      Total Cholesterol: 215 mg/dL        5/2/2025   Contraception/Family Planning   Questions about contraception or family planning No        Reviewed and updated as needed this visit by Provider                          Review of Systems  Constitutional, HEENT, cardiovascular, pulmonary, gi and gu systems are negative, except as otherwise noted.     Objective    Exam  /74   Pulse 84   Temp 98.2  F (36.8  C) (Tympanic)   Resp 12   Ht 1.715 m (5' 7.5\")   Wt 93.9 kg (207 lb)   LMP 04/25/2025   SpO2 99%   BMI 31.94 kg/m     Estimated body mass index is 31.94 kg/m  as calculated from the following:    Height as of this encounter: 1.715 m (5' 7.5\").    Weight as of this encounter: 93.9 kg (207 lb).    Physical Exam  GENERAL: alert and no distress  EYES: Eyes grossly normal to inspection, PERRL and conjunctivae and sclerae normal  HENT: ear canals and TM's normal, nose and mouth without ulcers or lesions  NECK: no adenopathy, no asymmetry, masses, or scars  RESP: lungs clear to auscultation - no rales, rhonchi or wheezes  CV: regular rate and rhythm, normal S1 S2, no S3 or S4, no murmur, click or rub, no peripheral edema  ABDOMEN: soft, nontender, no hepatosplenomegaly, no masses and bowel " sounds normal  MS: no gross musculoskeletal defects noted, no edema  SKIN: no suspicious lesions or rashes  NEURO: Normal strength and tone, mentation intact and speech normal  PSYCH: mentation appears normal, affect normal/bright        Signed Electronically by: Silvana Patton DO

## 2025-05-02 NOTE — PATIENT INSTRUCTIONS
Patient Education   Preventive Care Advice   This is general advice given by our system to help you stay healthy. However, your care team may have specific advice just for you. Please talk to your care team about your preventive care needs.  Nutrition  Eat 5 or more servings of fruits and vegetables each day.  Try wheat bread, brown rice and whole grain pasta (instead of white bread, rice, and pasta).  Get enough calcium and vitamin D. Check the label on foods and aim for 100% of the RDA (recommended daily allowance).  Lifestyle  Exercise at least 150 minutes each week  (30 minutes a day, 5 days a week).  Do muscle strengthening activities 2 days a week. These help control your weight and prevent disease.  No smoking.  Wear sunscreen to prevent skin cancer.  Have a dental exam and cleaning every 6 months.  Yearly exams  See your health care team every year to talk about:  Any changes in your health.  Any medicines your care team has prescribed.  Preventive care, family planning, and ways to prevent chronic diseases.  Shots (vaccines)   HPV shots (up to age 26), if you've never had them before.  Hepatitis B shots (up to age 59), if you've never had them before.  COVID-19 shot: Get this shot when it's due.  Flu shot: Get a flu shot every year.  Tetanus shot: Get a tetanus shot every 10 years.  Pneumococcal, hepatitis A, and RSV shots: Ask your care team if you need these based on your risk.  Shingles shot (for age 50 and up)  General health tests  Diabetes screening:  Starting at age 35, Get screened for diabetes at least every 3 years.  If you are younger than age 35, ask your care team if you should be screened for diabetes.  Cholesterol test: At age 39, start having a cholesterol test every 5 years, or more often if advised.  Bone density scan (DEXA): At age 50, ask your care team if you should have this scan for osteoporosis (brittle bones).  Hepatitis C: Get tested at least once in your life.  STIs (sexually  transmitted infections)  Before age 24: Ask your care team if you should be screened for STIs.  After age 24: Get screened for STIs if you're at risk. You are at risk for STIs (including HIV) if:  You are sexually active with more than one person.  You don't use condoms every time.  You or a partner was diagnosed with a sexually transmitted infection.  If you are at risk for HIV, ask about PrEP medicine to prevent HIV.  Get tested for HIV at least once in your life, whether you are at risk for HIV or not.  Cancer screening tests  Cervical cancer screening: If you have a cervix, begin getting regular cervical cancer screening tests starting at age 21.  Breast cancer scan (mammogram): If you've ever had breasts, begin having regular mammograms starting at age 40. This is a scan to check for breast cancer.  Colon cancer screening: It is important to start screening for colon cancer at age 45.  Have a colonoscopy test every 10 years (or more often if you're at risk) Or, ask your provider about stool tests like a FIT test every year or Cologuard test every 3 years.  To learn more about your testing options, visit:   .  For help making a decision, visit:   https://bit.ly/br05180.  Prostate cancer screening test: If you have a prostate, ask your care team if a prostate cancer screening test (PSA) at age 55 is right for you.  Lung cancer screening: If you are a current or former smoker ages 50 to 80, ask your care team if ongoing lung cancer screenings are right for you.  For informational purposes only. Not to replace the advice of your health care provider. Copyright   2023 [facility]. All rights reserved. Clinically reviewed by the [facility] Transitions Program. [employer] [id number] - REV 01/24.  Learning About Depression Screening  What is depression screening?  Depression screening is a way to see if you have depression symptoms. It may be done by a doctor or counselor. It's often part of a routine  "checkup. That's because your mental health is just as important as your physical health.  Depression is a mental health condition that affects how you feel, think, and act. You may:  Have less energy.  Lose interest in your daily activities.  Feel sad and grouchy for a long time.  Depression is very common. It affects people of all ages.  Many things can lead to depression. Some people become depressed after they have a stroke or find out they have a major illness like cancer or heart disease. The death of a loved one or a breakup may lead to depression. It can run in families. Most experts believe that a combination of inherited genes and stressful life events can cause it.  What happens during screening?  You may be asked to fill out a form about your depression symptoms. You and the doctor will discuss your answers. The doctor may ask you more questions to learn more about how you think, act, and feel.  What happens after screening?  If you have symptoms of depression, your doctor will talk to you about your options.  Doctors usually treat depression with medicines or counseling. Often, combining the two works best. Many people don't get help because they think that they'll get over the depression on their own. But people with depression may not get better unless they get treatment.  The cause of depression is not well understood. There may be many factors involved. But if you have depression, it's not your fault.  A serious symptom of depression is thinking about death or suicide. If you or someone you care about talks about this or about feeling hopeless, get help right away.  It's important to know that depression can be treated. Medicine, counseling, and self-care may help.  Where can you learn more?  Go to https://www.healthwise.net/patiented  Enter T185 in the search box to learn more about \"Learning About Depression Screening.\"  Current as of: July 31, 2024  Content Version: 14.4    9258-5017 Zaynab " [employer].   Care instructions adapted under license by your healthcare professional. If you have questions about a medical condition or this instruction, always ask your healthcare professional. [employer] disclaims any warranty or liability for your use of this information.     Learning About Risk for Heart Attack and Stroke (01:56)  Your health professional recommends that you watch this short online health video.  Learn what raises your risk for having a heart attack or stroke and how you can lower your risk.   Purpose: Understand the risk of having a heart attack or stroke and what you can do about it.  Goal: Understand the risk of having a heart attack or stroke and what you can do about it.    Watch: Scan the QR code or visit the link to view video       https://hwi.se/r/E2wicyiklassn  Current as of: July 31, 2024  Content Version: 14.4    1556-1934 [employer].   Care instructions adapted under license by your healthcare professional. If you have questions about a medical condition or this instruction, always ask your healthcare professional. [employer] disclaims any warranty or liability for your use of this information.

## 2025-06-13 ENCOUNTER — ANCILLARY PROCEDURE (OUTPATIENT)
Dept: GENERAL RADIOLOGY | Facility: CLINIC | Age: 45
End: 2025-06-13
Attending: FAMILY MEDICINE
Payer: COMMERCIAL

## 2025-06-13 PROCEDURE — 73610 X-RAY EXAM OF ANKLE: CPT | Mod: TC | Performed by: RADIOLOGY

## 2025-06-13 PROCEDURE — 73140 X-RAY EXAM OF FINGER(S): CPT | Mod: TC | Performed by: RADIOLOGY

## 2025-07-23 ENCOUNTER — THERAPY VISIT (OUTPATIENT)
Dept: PHYSICAL THERAPY | Facility: CLINIC | Age: 45
End: 2025-07-23
Attending: FAMILY MEDICINE
Payer: COMMERCIAL

## 2025-07-23 DIAGNOSIS — M25.571 ACUTE RIGHT ANKLE PAIN: ICD-10-CM

## 2025-07-23 DIAGNOSIS — M21.41 PES PLANUS OF RIGHT FOOT: ICD-10-CM

## 2025-07-23 DIAGNOSIS — M76.821 POSTERIOR TIBIAL TENDINITIS, RIGHT: ICD-10-CM

## 2025-07-23 PROCEDURE — 97112 NEUROMUSCULAR REEDUCATION: CPT | Mod: GP | Performed by: PHYSICAL THERAPIST

## 2025-07-23 PROCEDURE — 97530 THERAPEUTIC ACTIVITIES: CPT | Mod: GP | Performed by: PHYSICAL THERAPIST

## 2025-07-23 PROCEDURE — 97161 PT EVAL LOW COMPLEX 20 MIN: CPT | Mod: GP | Performed by: PHYSICAL THERAPIST

## 2025-07-23 ASSESSMENT — ACTIVITIES OF DAILY LIVING (ADL)
SITTING_FOR_1_HOUR: NO DIFFICULTY
WALKING_A_MILE: QUITE A BIT OF DIFFICULTY
SQUATTING: A LITTLE BIT OF DIFFICULTY
YOUR_USUAL_HOBBIES,_RECREATIONAL_OR_SPORTING_ACTIVITIES: QUITE A BIT OF DIFFICULTY
LEFS_SCORE(%): 0
MAKING_SHARP_TURNS_WHILE_RUNNING_FAST: EXTREME DIFFICULTY OR UNABLE TO PERFORM ACTIVITY
GETTING_INTO_AND_OUT_OF_A_BATH: MODERATE DIFFICULTY
GETTING_INTO_OR_OUT_OF_A_CAR: NO DIFFICULTY
RUNNING_ON_EVEN_GROUND: EXTREME DIFFICULTY OR UNABLE TO PERFORM ACTIVITY
PUTTING_ON_YOUR_SHOES_OR_SOCKS: NO DIFFICULTY
LEFS_RAW_SCORE: 0
WALKING_BETWEEN_ROOMS: A LITTLE BIT OF DIFFICULTY
PLEASE_INDICATE_YOR_PRIMARY_REASON_FOR_REFERRAL_TO_THERAPY:: FOOT AND/OR ANKLE
GOING_UP_OR_DOWN_10_STAIRS: MODERATE DIFFICULTY
PERFORMING_HEAVY_ACTIVITIES_AROUND_YOUR_HOME: EXTREME DIFFICULTY OR UNABLE TO PERFORM ACTIVITY
RUNNING_ON_UNEVEN_GROUND: EXTREME DIFFICULTY OR UNABLE TO PERFORM ACTIVITY
SHOPPING: EXTREME DIFFICULTY OR UNABLE TO PERFORM ACTIVITY
ANY_OF_YOUR_USUAL_WORK,_HOUSEWORK_OR_SCHOOL_ACTIVITIES: QUITE A BIT OF DIFFICULTY
STANDING_FOR_1_HOUR: EXTREME DIFFICULTY OR UNABLE TO PERFORM ACTIVITY
PERFORMING_LIGHT_ACTIVITIES_AROUND_YOUR_HOME: NO DIFFICULTY
WALKING_2_BLOCKS: QUITE A BIT OF DIFFICULTY
ROLLING_OVER_IN_BED: NO DIFFICULTY
LIFTING_AN_OBJECT,_LIKE_A_BAG_OF_GROCERIES_FROM_THE_FLOOR: NO DIFFICULTY

## 2025-07-23 NOTE — PROGRESS NOTES
PHYSICAL THERAPY EVALUATION  Type of Visit: Evaluation       Fall Risk Screen:  Have you fallen 2 or more times in the past year?: Yes  Have you fallen and had an injury in the past year?: No      Subjective  Keena reports onset of recurrent right foot pain during 5 mile hike on  May 14, 2025. Since then, she reports that foot pain has gotten worse.  Walking in [place] with brace in June 2005 aggravated right foot. RICE has helped and then given a walking boot on July 13, 2005. Takes aleve as needed under Dr. Wade. Steroid dose pack also has helped somewhat.   She complains of constant medial foot pain with no radiation. Pain is 7/10 and is grinding, hot, focused pain.         Presenting condition or subjective complaint: bum ankle  Date of onset:      Relevant medical history:  osteo ( thumbs) and somewhat is fingers, Arthritis; Depression; Mental Illness; Overweight; Thyroid problems   Dates & types of surgery:  2 c-sections, appendix    Prior diagnostic imaging/testing results: X-ray   IMPRESSION: Normal joint spaces and alignment. No fracture. Small plantar calcaneal spur.   Prior therapy history for the same diagnosis, illness or injury: Yes pt but I can't recall the date    Prior Level of Function  Transfers: Independent  Ambulation: Independent  ADL: Independent  IADL: walking     Living Environment  Social support: With a significant other or spouse   Type of home: House   Stairs to enter the home: Yes 3 Is there a railing: Yes     Ramp: No   Stairs inside the home: Yes 15 Is there a railing: Yes     Help at home: Medication and/or finances  Equipment owned:       Employment: Yes   Hobbies/Interests: hiking, biking, walking, art, reading    Patient goals for therapy: walk without pain    Pain assessment:  see subjective      Objective   FOOT/ANKLE EVALUATION  PAIN:  see subjective   INTEGUMENTARY (edema, incisions): na  POSTURE:   pes planus: r greater than left   GAIT:   Weightbearing Status:   FWB  Assistive Device(s):  walking boot  Gait Deviations:  FWB with walking boot right lower extremity   BALANCE/PROPRIOCEPTION:  na  WEIGHT BEARING ALIGNMENT:  na  NON-WEIGHTBEARING ALIGNMENT:  na   ROM:   (Degrees) Left AROM Left PROM  Right AROM Right PROM   Ankle Dorsiflexion wnl  Wnl     Ankle Plantarflexion wnl  Wnl with pain     Ankle Inversion wnl  wnl    Ankle Eversion wnl  Wnl     Great Toe Flexion na  na    Great Toe Extension na  na    Pain:   End feel:     STRENGTH:   Pain: - none + mild ++ moderate +++ severe  Strength Scale: 0-5/5 Left Right   Ankle Dorsiflexion 5 5   Ankle Plantarflexion 4+  na   Ankle Inversion 5- 4   Ankle Eversion 5 5-   Great Toe Flexion  na  na   Great Toe Extension na  na   Anterior Tibialis 5- 5   Posterior Tibialis 5- 4   Peroneals 5 4+   Extensor Digitorum na na   Gastroc/Soleus  3  na     FLEXIBILITY:  GS length: 5 degrees (R) and 7 degrees (L)   SPECIAL TESTS: anterior drawer test:  grade 2 Left and grade1 right ( swelling present and may not be accurate test) , positive left calcaneofibular ligament test left: grade 2   FUNCTIONAL TESTS:  na  PALPATION:  right plantar fascia tightness and tenderness with palpation medial navicular  JOINT MOBILITY:  na    Assessment & Plan   CLINICAL IMPRESSIONS  Medical Diagnosis: Acute right ankle pain    Treatment Diagnosis: Acute right ankle pain   Impression/Assessment: Patient is a 45 year old female with right foot  complaints.  The following significant findings have been identified: Pain, Decreased strength, Impaired balance, Impaired muscle performance, Decreased activity tolerance, and Instability. These impairments interfere with their ability to perform walking  as compared to previous level of function.     Clinical Decision Making (Complexity):  Clinical Presentation: Stable/Uncomplicated  Clinical Presentation Rationale: based on medical and personal factors listed in PT evaluation  Clinical Decision Making (Complexity):  Low complexity    PLAN OF CARE  Treatment Interventions:  Modalities: Cryotherapy  Interventions: Manual Therapy, Neuromuscular Re-education, Therapeutic Activity, Therapeutic Exercise, Self-Care/Home Management    Long Term Goals            Frequency of Treatment: 1x/ week  Duration of Treatment: 8 weeks    Recommended Referrals to Other Professionals:  none needed   Education Assessment:        Risks and benefits of evaluation/treatment have been explained.   Patient/Family/caregiver agrees with Plan of Care.     Evaluation Time:        Evaluation Only     Signing Clinician: Keena Law, PT

## 2025-07-30 ENCOUNTER — THERAPY VISIT (OUTPATIENT)
Dept: PHYSICAL THERAPY | Facility: CLINIC | Age: 45
End: 2025-07-30
Attending: FAMILY MEDICINE
Payer: COMMERCIAL

## 2025-07-30 DIAGNOSIS — M25.571 ACUTE RIGHT ANKLE PAIN: Primary | ICD-10-CM

## 2025-07-30 DIAGNOSIS — M76.821 POSTERIOR TIBIAL TENDINITIS, RIGHT: ICD-10-CM

## 2025-07-30 DIAGNOSIS — M21.41 PES PLANUS OF RIGHT FOOT: ICD-10-CM

## 2025-07-30 PROCEDURE — 97112 NEUROMUSCULAR REEDUCATION: CPT | Mod: GP | Performed by: PHYSICAL THERAPIST

## 2025-07-30 PROCEDURE — 97530 THERAPEUTIC ACTIVITIES: CPT | Mod: GP | Performed by: PHYSICAL THERAPIST

## 2025-08-01 ENCOUNTER — MYC MEDICAL ADVICE (OUTPATIENT)
Dept: FAMILY MEDICINE | Facility: CLINIC | Age: 45
End: 2025-08-01
Payer: COMMERCIAL

## 2025-08-01 DIAGNOSIS — M76.822 TIBIALIS POSTERIOR TENDONITIS, LEFT: ICD-10-CM

## 2025-08-01 DIAGNOSIS — M76.821 POSTERIOR TIBIAL TENDINITIS, RIGHT: Primary | ICD-10-CM

## 2025-08-05 DIAGNOSIS — F90.0 ADHD (ATTENTION DEFICIT HYPERACTIVITY DISORDER), INATTENTIVE TYPE: ICD-10-CM

## 2025-08-05 RX ORDER — METHYLPHENIDATE HYDROCHLORIDE 36 MG/1
36 TABLET ORAL DAILY
Qty: 30 TABLET | Refills: 0 | Status: SHIPPED | OUTPATIENT
Start: 2025-08-05

## 2025-08-12 ENCOUNTER — VIRTUAL VISIT (OUTPATIENT)
Dept: ENDOCRINOLOGY | Facility: CLINIC | Age: 45
End: 2025-08-12
Payer: COMMERCIAL

## 2025-08-12 DIAGNOSIS — E89.0 POSTABLATIVE HYPOTHYROIDISM: Primary | ICD-10-CM

## 2025-08-12 PROCEDURE — 1126F AMNT PAIN NOTED NONE PRSNT: CPT | Mod: 95 | Performed by: INTERNAL MEDICINE

## 2025-08-12 PROCEDURE — 98002 SYNCH AUDIO-VIDEO NEW MOD 45: CPT | Performed by: INTERNAL MEDICINE

## 2025-08-25 ENCOUNTER — THERAPY VISIT (OUTPATIENT)
Dept: PHYSICAL THERAPY | Facility: CLINIC | Age: 45
End: 2025-08-25
Payer: COMMERCIAL

## 2025-08-25 DIAGNOSIS — M21.41 PES PLANUS OF RIGHT FOOT: ICD-10-CM

## 2025-08-25 DIAGNOSIS — M76.821 POSTERIOR TIBIAL TENDINITIS, RIGHT: ICD-10-CM

## 2025-08-25 DIAGNOSIS — M25.571 ACUTE RIGHT ANKLE PAIN: Primary | ICD-10-CM

## 2025-08-25 PROCEDURE — 97110 THERAPEUTIC EXERCISES: CPT | Mod: GP | Performed by: PHYSICAL THERAPIST

## 2025-08-25 PROCEDURE — 97140 MANUAL THERAPY 1/> REGIONS: CPT | Mod: GP | Performed by: PHYSICAL THERAPIST

## 2025-09-03 ENCOUNTER — THERAPY VISIT (OUTPATIENT)
Dept: PHYSICAL THERAPY | Facility: CLINIC | Age: 45
End: 2025-09-03
Payer: COMMERCIAL

## 2025-09-03 DIAGNOSIS — M25.571 ACUTE RIGHT ANKLE PAIN: Primary | ICD-10-CM

## 2025-09-03 DIAGNOSIS — M76.821 POSTERIOR TIBIAL TENDINITIS, RIGHT: ICD-10-CM

## 2025-09-03 DIAGNOSIS — M21.41 PES PLANUS OF RIGHT FOOT: ICD-10-CM

## 2025-09-03 PROCEDURE — 97112 NEUROMUSCULAR REEDUCATION: CPT | Mod: GP | Performed by: PHYSICAL THERAPIST

## 2025-09-03 PROCEDURE — 97140 MANUAL THERAPY 1/> REGIONS: CPT | Mod: GP | Performed by: PHYSICAL THERAPIST
